# Patient Record
Sex: MALE | Race: WHITE | NOT HISPANIC OR LATINO | Employment: FULL TIME | URBAN - METROPOLITAN AREA
[De-identification: names, ages, dates, MRNs, and addresses within clinical notes are randomized per-mention and may not be internally consistent; named-entity substitution may affect disease eponyms.]

---

## 2017-01-10 ENCOUNTER — ALLSCRIPTS OFFICE VISIT (OUTPATIENT)
Dept: OTHER | Facility: OTHER | Age: 47
End: 2017-01-10

## 2017-01-27 ENCOUNTER — GENERIC CONVERSION - ENCOUNTER (OUTPATIENT)
Dept: OTHER | Facility: OTHER | Age: 47
End: 2017-01-27

## 2017-05-30 ENCOUNTER — ALLSCRIPTS OFFICE VISIT (OUTPATIENT)
Dept: OTHER | Facility: OTHER | Age: 47
End: 2017-05-30

## 2017-05-30 DIAGNOSIS — R07.81 PLEURODYNIA: ICD-10-CM

## 2017-05-31 ENCOUNTER — TRANSCRIBE ORDERS (OUTPATIENT)
Dept: URGENT CARE | Facility: CLINIC | Age: 47
End: 2017-05-31

## 2017-05-31 ENCOUNTER — HOSPITAL ENCOUNTER (OUTPATIENT)
Dept: RADIOLOGY | Facility: CLINIC | Age: 47
Discharge: HOME | End: 2017-05-31
Payer: COMMERCIAL

## 2017-05-31 DIAGNOSIS — R07.81 PLEURODYNIA: ICD-10-CM

## 2017-05-31 PROCEDURE — 71101 X-RAY EXAM UNILAT RIBS/CHEST: CPT

## 2017-06-13 ENCOUNTER — ALLSCRIPTS OFFICE VISIT (OUTPATIENT)
Dept: OTHER | Facility: OTHER | Age: 47
End: 2017-06-13

## 2017-09-15 ENCOUNTER — ALLSCRIPTS OFFICE VISIT (OUTPATIENT)
Dept: OTHER | Facility: OTHER | Age: 47
End: 2017-09-15

## 2017-11-10 ENCOUNTER — ALLSCRIPTS OFFICE VISIT (OUTPATIENT)
Dept: OTHER | Facility: OTHER | Age: 47
End: 2017-11-10

## 2017-11-11 NOTE — PROGRESS NOTES
Assessment    1  Acute maxillary sinusitis (461 0) (J01 00)    Plan  Acute maxillary sinusitis    · MethylPREDNISolone 4 MG Oral Tablet Therapy Pack (Medrol); as directed   · Zithromax Z-Daniel 250 MG Oral Tablet (Azithromycin); TAKE 2 TABLETS ON DAY 1THEN TAKE 1 TABLET A DAY FOR 4 DAYS    Discussion/Summary  The patient was counseled regarding diagnostic results,-- instructions for management,-- risk factor reductions,-- prognosis,-- patient and family education,-- impressions,-- risks and benefits of treatment options,-- importance of compliance with treatment  Possible side effects of new medications were reviewed with the patient/guardian today  The treatment plan was reviewed with the patient/guardian  The patient/guardian understands and agrees with the treatment plan      Chief Complaint  252congestion, having trouble breathing      History of Present Illness  HPI: pt complains of sinus pain and pressure, PND, cough worse with laying down, it started 2 weeks ago, pt tried zyrtec which did not help, pt has not been around sick people, pt denies vomitng or diarrhea but does have nausea      Review of Systems   Constitutional: no fever-- and-- no chills  Respiratory: no shortness of breath-- and-- no wheezing  Active Problems  1  Abdominal pain (789 00) (R10 9)   2  Acute deep vein thrombosis of lower limb, unspecified laterality   3  Acute maxillary sinusitis (461 0) (J01 00)   4  Acute sinusitis (461 9) (J01 90)   5  Allergic rhinitis due to pollen (477 0) (J30 1)   6  Benign colon polyp (211 3) (K63 5)   7  Benign essential hypertension (401 1) (I10)   8  Breast hypertrophy (611 1) (N62)   9  Chest pain (786 50) (R07 9)   10  Cough (786 2) (R05)   11  Erectile dysfunction (607 84) (N52 9)   12  External hemorrhoids (455 3) (K64 4)   13  Foot pain (729 5) (M79 673)   14  Hypercholesterolemia (272 0) (E78 00)   15  Hyperlipidemia (272 4) (E78 5)   16  Hypothyroidism (244 9) (E03 9)   17   Leg pain (729 5) (M79 606)   18  Low back pain (724 2) (M54 5)   19  Male erectile dysfunction (607 84) (N52 9)   20  Neck pain (723 1) (M54 2)   21  Need for influenza vaccination (V04 81) (Z23)   22  Rib pain (786 50) (R07 81)   23  Serous otitis media (381 4) (H65 90)   24  Sinus drainage (478 19) (J34 89)   25  Slow transit constipation (564 01) (K59 01)   26  Varicose Veins Of Lower Extremities (454 9)   27  Visual disturbances (368 9) (H53 9)    Past Medical History  1  History of Denial Of Any Significant Medical History   2  History of chest pain (V13 89) (Z87 898)   3  History of neck pain (V13 59) (Z87 39)   4  No pertinent past medical history    Family History  Mother    1  Family history of cerebral aneurysm (V17 1) (Z82 49)  Father    2  Family history of type 2 diabetes mellitus (V18 0) (Z83 3)  Maternal Grandmother    3  Family history of CAD (coronary artery disease)  Paternal Grandmother    4  Family history of colon cancer (V16 0) (Z80 0)  Family History Reviewed: The family history was reviewed and updated today  Social History   ·    · Never Drank Alcohol   · Never smoked cigarettes (V49 89) (Z78 9)   · Occupation   · Social alcohol use (Z78 9)   · Two children  The social history was reviewed and is unchanged  Surgical History    1  History of Eye Surgery   2  History of Umbilical Hernia Repair   3  History of Venous Ligation With Stripping    Current Meds   1  Aspirin 325 MG Oral Tablet; TAKE 1 TABLET EVERY MORNING; Therapy: (Recorded:17Mar2016) to Recorded   2  Ibuprofen 800 MG Oral Tablet; TAKE 1 TABLET 3 TIMES DAILY WITH FOOD AS NEEDED; Therapy: 92NRE6515 to (Evaluate:29Jun2017)  Requested for: 65FTN6104; Last Rx:88Dki0563 Ordered   3  Lisinopril 20 MG Oral Tablet; TAKE 1 TABLET DAILY AS DIRECTED  Requested for: 57Yfz1465; Last Rx:45Cll7373 Ordered   4  Omega-3 CAPS; Therapy: (Recorded:10Vuz1948) to Recorded   5  Red Yeast Rice CAPS;  Therapy: (Recorded:12Mem0150) to Recorded    The medication list was reviewed and updated today  Allergies  1  No Known Drug Allergies    Vitals   Recorded: 83UCA2892 03:08PM   Temperature 96 4 F, Tympanic   Heart Rate 93   Systolic 717   Diastolic 80   Height 6 ft 1 in   Weight 252 lb    BMI Calculated 33 25   BSA Calculated 2 37   O2 Saturation 97       Physical Exam   Constitutional  General appearance: No acute distress, well appearing and well nourished  well developed,-- appears healthy,-- well nourished-- and-- well hydrated  Ears, Nose, Mouth, and Throat  External inspection of ears and nose: Normal    Otoscopic examination: Tympanic membrance translucent with normal light reflex  Canals patent without erythema  Nasal mucosa, septum, and turbinates: Abnormal   There was a mucoid discharge from both nares  The bilateral nasal mucosa was boggy  Oropharynx: Abnormal  -- cobblestone OP  Pulmonary  Respiratory effort: No increased work of breathing or signs of respiratory distress  Respiratory rate: normal  Assessment of respiratory effort revealed normal rhythm and effort  Auscultation of lungs: Clear to auscultation, equal breath sounds bilaterally, no wheezes, no rales, no rhonci  no rales or crackles were heard bilaterally  no rhonchi  no friction rub  no wheezing  Cardiovascular  Auscultation of heart: Normal rate and rhythm, normal S1 and S2, without murmurs  The heart rate was normal  The rhythm was regular  Heart sounds: normal S1-- and-- normal S2  no murmurs were heard  Examination of extremities for edema and/or varicosities: Normal    Lymphatic  Palpation of lymph nodes in neck: No lymphadenopathy  Skin  Skin and subcutaneous tissue: Normal without rashes or lesions     Psychiatric  Mood and affect: Normal          Future Appointments    Date/Time Provider Specialty Site   01/29/2018 09:00 AM Kp Francis MD Urology [address] [place] [address]       Signatures   Electronically signed by : Stephan Torres DO; Nov 10 2017 3:27PM EST                       (Author)

## 2017-12-14 ENCOUNTER — ALLSCRIPTS OFFICE VISIT (OUTPATIENT)
Dept: OTHER | Facility: OTHER | Age: 47
End: 2017-12-14

## 2017-12-15 NOTE — PROGRESS NOTES
Assessment  1  Plantar wart of left foot (078 12) (B07 0)   2  Plantar wart of right foot (078 12) (B07 0)   3  Smell disturbance (781 1) (R43 9)    Plan  Plantar wart of right foot    · Ericka Lopes  (Podiatry) Co-Management  *  Status: Hold For - Scheduling Requested for: 38KTC8056  Care Summary provided  : Yes  Smell disturbance    · 2 - Elio Edmundo Coombs DO (Otolaryngology) Co-Management  *  Status: Hold For -Scheduling  Requested for: 97KVR6091  Care Summary provided  : Yes    Discussion/Summary  The patient was counseled regarding diagnostic results,-- instructions for management,-- risk factor reductions,-- prognosis,-- patient and family education,-- impressions,-- risks and benefits of treatment options,-- importance of compliance with treatment  Chief Complaint  Complains of planters warts on both feetAlso states 6-7 times a day for 6 months smells something sweet that no one around him smells      History of Present Illness  HPI: pt complains of plantars warts on the bottom of both feet, pt tried OTC medications but did not help, pt also complains of a sweet smell that comes and goes but it not linked to any location      Review of Systems   Constitutional: no fever-- and-- no chills  Respiratory: no shortness of breath-- and-- no wheezing  Active Problems  1  Abdominal pain (789 00) (R10 9)   2  Acute deep vein thrombosis of lower limb, unspecified laterality   3  Acute maxillary sinusitis (461 0) (J01 00)   4  Acute sinusitis (461 9) (J01 90)   5  Allergic rhinitis due to pollen (477 0) (J30 1)   6  Benign colon polyp (211 3) (K63 5)   7  Benign essential hypertension (401 1) (I10)   8  Breast hypertrophy (611 1) (N62)   9  Chest pain (786 50) (R07 9)   10  Cough (786 2) (R05)   11  Erectile dysfunction (607 84) (N52 9)   12  External hemorrhoids (455 3) (K64 4)   13  Foot pain (729 5) (M79 673)   14  Hypercholesterolemia (272 0) (E78 00)   15  Hyperlipidemia (272 4) (E78 5)   16  Hypothyroidism (244 9) (E03 9)   17  Leg pain (729 5) (M79 606)   18  Low back pain (724 2) (M54 5)   19  Male erectile dysfunction (607 84) (N52 9)   20  Neck pain (723 1) (M54 2)   21  Need for influenza vaccination (V04 81) (Z23)   22  Rib pain (786 50) (R07 81)   23  Serous otitis media (381 4) (H65 90)   24  Sinus drainage (478 19) (J34 89)   25  Slow transit constipation (564 01) (K59 01)   26  Varicose Veins Of Lower Extremities (454 9)   27  Visual disturbances (368 9) (H53 9)    Past Medical History  1  History of Denial Of Any Significant Medical History   2  History of chest pain (V13 89) (Z87 898)   3  History of neck pain (V13 59) (Z87 39)   4  No pertinent past medical history    Family History  Mother    1  Family history of cerebral aneurysm (V17 1) (Z82 49)  Father    2  Family history of type 2 diabetes mellitus (V18 0) (Z83 3)  Maternal Grandmother    3  Family history of CAD (coronary artery disease)  Paternal Grandmother    4  Family history of colon cancer (V16 0) (Z80 0)    Social History   ·    · Never Drank Alcohol   · Never smoked cigarettes (V49 89) (Z78 9)   · Occupation   · Social alcohol use (Z78 9)   · Two children  The social history was reviewed and updated today  The social history was reviewed and is unchanged  Surgical History  1  History of Eye Surgery   2  History of Umbilical Hernia Repair   3  History of Venous Ligation With Stripping    Current Meds   1  Aspirin 325 MG Oral Tablet; TAKE 1 TABLET EVERY MORNING; Therapy: (Recorded:17Mar2016) to Recorded   2  Ibuprofen 800 MG Oral Tablet; TAKE 1 TABLET 3 TIMES DAILY WITH FOOD AS NEEDED; Therapy: 34FMI6384 to (Evaluate:29Jun2017)  Requested for: 39DMQ2538; Last Rx:15Xzo5109 Ordered   3  Lisinopril 20 MG Oral Tablet; TAKE 1 TABLET DAILY AS DIRECTED  Requested for: 44TSP7445; Last Rx:63Lui5747 Ordered   4  MethylPREDNISolone 4 MG Oral Tablet Therapy Pack; as directed;  Therapy: 13IGT2982 to (Last Rx:10Nov2017)  Requested for: 15WDC4792 Ordered   5  Omega-3 CAPS; Therapy: (Recorded:54Pjp5738) to Recorded   6  Red Yeast Rice CAPS; Therapy: (Recorded:73Adk3926) to Recorded   7  Zithromax Z-Daniel 250 MG Oral Tablet; TAKE 2 TABLETS ON DAY 1 THEN TAKE 1 TABLET A DAY FOR 4 DAYS; Therapy: 99ROA2278 to (Last Rx:10Nov2017)  Requested for: 15OWD1707 Ordered    The medication list was reviewed and updated today  Allergies  1  No Known Drug Allergies    Vitals   Recorded: 17Pik7111 11:01AM   Temperature 88 9 F   Systolic 384   Diastolic 84   Height 6 ft 1 in   Weight 249 lb    BMI Calculated 32 85   BSA Calculated 2 36     Physical Exam   Constitutional  General appearance: No acute distress, well appearing and well nourished  well developed,-- appears healthy,-- well nourished-- and-- well hydrated  Pulmonary  Respiratory effort: No increased work of breathing or signs of respiratory distress  Respiratory rate: normal  Assessment of respiratory effort revealed normal rhythm and effort  Auscultation of lungs: Clear to auscultation, equal breath sounds bilaterally, no wheezes, no rales, no rhonci  no rales or crackles were heard bilaterally  no rhonchi  no friction rub  no wheezing  Cardiovascular  Auscultation of heart: Normal rate and rhythm, normal S1 and S2, without murmurs  The heart rate was normal  The rhythm was regular  Heart sounds: normal S1-- and-- normal S2  no murmurs were heard  Lymphatic  Palpation of lymph nodes in neck: No lymphadenopathy  Skin  Skin and subcutaneous tissue: Normal without rashes or lesions     Psychiatric  Mood and affect: Normal          Future Appointments    Date/Time Provider Specialty Site   01/29/2018 09:00 AM Екатерина Bridges MD Urology [address]     Signatures   Electronically signed by : Pretty Montero DO; Dec 14 2017 11:26AM EST                       (Author)

## 2017-12-20 ENCOUNTER — GENERIC CONVERSION - ENCOUNTER (OUTPATIENT)
Dept: FAMILY MEDICINE CLINIC | Facility: CLINIC | Age: 47
End: 2017-12-20

## 2017-12-28 ENCOUNTER — GENERIC CONVERSION - ENCOUNTER (OUTPATIENT)
Dept: OTHER | Facility: OTHER | Age: 47
End: 2017-12-28

## 2017-12-28 ENCOUNTER — APPOINTMENT (OUTPATIENT)
Dept: LAB | Facility: CLINIC | Age: 47
End: 2017-12-28
Payer: COMMERCIAL

## 2017-12-28 ENCOUNTER — TRANSCRIBE ORDERS (OUTPATIENT)
Dept: LAB | Facility: CLINIC | Age: 47
End: 2017-12-28

## 2017-12-28 DIAGNOSIS — E78.00 PURE HYPERCHOLESTEROLEMIA: ICD-10-CM

## 2017-12-28 DIAGNOSIS — E78.5 HYPERLIPIDEMIA: ICD-10-CM

## 2017-12-28 DIAGNOSIS — I10 ESSENTIAL (PRIMARY) HYPERTENSION: ICD-10-CM

## 2017-12-28 DIAGNOSIS — Z00.00 ENCOUNTER FOR GENERAL ADULT MEDICAL EXAMINATION WITHOUT ABNORMAL FINDINGS: ICD-10-CM

## 2017-12-28 DIAGNOSIS — R10.9 ABDOMINAL PAIN: ICD-10-CM

## 2017-12-28 LAB
ALBUMIN SERPL BCP-MCNC: 3.6 G/DL (ref 3.5–5)
ALP SERPL-CCNC: 65 U/L (ref 46–116)
ALT SERPL W P-5'-P-CCNC: 35 U/L (ref 12–78)
ANION GAP SERPL CALCULATED.3IONS-SCNC: 8 MMOL/L (ref 4–13)
AST SERPL W P-5'-P-CCNC: 20 U/L (ref 5–45)
BASOPHILS # BLD AUTO: 0.02 THOUSANDS/ΜL (ref 0–0.1)
BASOPHILS NFR BLD AUTO: 0 % (ref 0–1)
BILIRUB SERPL-MCNC: 0.44 MG/DL (ref 0.2–1)
BUN SERPL-MCNC: 17 MG/DL (ref 5–25)
CALCIUM SERPL-MCNC: 8.6 MG/DL (ref 8.3–10.1)
CHLORIDE SERPL-SCNC: 105 MMOL/L (ref 100–108)
CHOLEST SERPL-MCNC: 191 MG/DL (ref 50–200)
CO2 SERPL-SCNC: 26 MMOL/L (ref 21–32)
CREAT SERPL-MCNC: 0.95 MG/DL (ref 0.6–1.3)
EOSINOPHIL # BLD AUTO: 0.21 THOUSAND/ΜL (ref 0–0.61)
EOSINOPHIL NFR BLD AUTO: 3 % (ref 0–6)
ERYTHROCYTE [DISTWIDTH] IN BLOOD BY AUTOMATED COUNT: 12.7 % (ref 11.6–15.1)
GFR SERPL CREATININE-BSD FRML MDRD: 94 ML/MIN/1.73SQ M
GLUCOSE P FAST SERPL-MCNC: 92 MG/DL (ref 65–99)
HCT VFR BLD AUTO: 47.4 % (ref 36.5–49.3)
HDLC SERPL-MCNC: 46 MG/DL (ref 40–60)
HGB BLD-MCNC: 16.4 G/DL (ref 12–17)
LDLC SERPL CALC-MCNC: 130 MG/DL (ref 0–100)
LYMPHOCYTES # BLD AUTO: 1.85 THOUSANDS/ΜL (ref 0.6–4.47)
LYMPHOCYTES NFR BLD AUTO: 28 % (ref 14–44)
MCH RBC QN AUTO: 32.2 PG (ref 26.8–34.3)
MCHC RBC AUTO-ENTMCNC: 34.6 G/DL (ref 31.4–37.4)
MCV RBC AUTO: 93 FL (ref 82–98)
MONOCYTES # BLD AUTO: 0.74 THOUSAND/ΜL (ref 0.17–1.22)
MONOCYTES NFR BLD AUTO: 11 % (ref 4–12)
NEUTROPHILS # BLD AUTO: 3.83 THOUSANDS/ΜL (ref 1.85–7.62)
NEUTS SEG NFR BLD AUTO: 58 % (ref 43–75)
NRBC BLD AUTO-RTO: 0 /100 WBCS
PLATELET # BLD AUTO: 228 THOUSANDS/UL (ref 149–390)
PMV BLD AUTO: 10.3 FL (ref 8.9–12.7)
POTASSIUM SERPL-SCNC: 4.4 MMOL/L (ref 3.5–5.3)
PROT SERPL-MCNC: 7.2 G/DL (ref 6.4–8.2)
RBC # BLD AUTO: 5.09 MILLION/UL (ref 3.88–5.62)
SODIUM SERPL-SCNC: 139 MMOL/L (ref 136–145)
TRIGL SERPL-MCNC: 73 MG/DL
TSH SERPL DL<=0.05 MIU/L-ACNC: 2.21 UIU/ML (ref 0.36–3.74)
WBC # BLD AUTO: 6.66 THOUSAND/UL (ref 4.31–10.16)

## 2017-12-28 PROCEDURE — 36415 COLL VENOUS BLD VENIPUNCTURE: CPT

## 2017-12-28 PROCEDURE — 85025 COMPLETE CBC W/AUTO DIFF WBC: CPT

## 2017-12-28 PROCEDURE — 84443 ASSAY THYROID STIM HORMONE: CPT

## 2017-12-28 PROCEDURE — 80061 LIPID PANEL: CPT

## 2017-12-28 PROCEDURE — 80053 COMPREHEN METABOLIC PANEL: CPT

## 2018-01-10 ENCOUNTER — ALLSCRIPTS OFFICE VISIT (OUTPATIENT)
Dept: OTHER | Facility: OTHER | Age: 48
End: 2018-01-10

## 2018-01-10 DIAGNOSIS — E78.00 PURE HYPERCHOLESTEROLEMIA: ICD-10-CM

## 2018-01-10 DIAGNOSIS — Z12.5 ENCOUNTER FOR SCREENING FOR MALIGNANT NEOPLASM OF PROSTATE: ICD-10-CM

## 2018-01-10 DIAGNOSIS — I10 ESSENTIAL (PRIMARY) HYPERTENSION: ICD-10-CM

## 2018-01-13 VITALS
BODY MASS INDEX: 33.13 KG/M2 | HEART RATE: 70 BPM | SYSTOLIC BLOOD PRESSURE: 118 MMHG | TEMPERATURE: 97.4 F | DIASTOLIC BLOOD PRESSURE: 80 MMHG | WEIGHT: 250 LBS | HEIGHT: 73 IN

## 2018-01-13 VITALS
HEART RATE: 93 BPM | HEIGHT: 73 IN | BODY MASS INDEX: 33.4 KG/M2 | TEMPERATURE: 96.4 F | WEIGHT: 252 LBS | SYSTOLIC BLOOD PRESSURE: 120 MMHG | OXYGEN SATURATION: 97 % | DIASTOLIC BLOOD PRESSURE: 80 MMHG

## 2018-01-14 VITALS
DIASTOLIC BLOOD PRESSURE: 88 MMHG | HEIGHT: 73 IN | BODY MASS INDEX: 33.58 KG/M2 | WEIGHT: 253.38 LBS | SYSTOLIC BLOOD PRESSURE: 138 MMHG | TEMPERATURE: 97 F

## 2018-01-14 VITALS
SYSTOLIC BLOOD PRESSURE: 126 MMHG | HEIGHT: 73 IN | TEMPERATURE: 97.5 F | WEIGHT: 253.25 LBS | BODY MASS INDEX: 33.56 KG/M2 | DIASTOLIC BLOOD PRESSURE: 86 MMHG

## 2018-01-14 VITALS
SYSTOLIC BLOOD PRESSURE: 108 MMHG | BODY MASS INDEX: 32.01 KG/M2 | HEIGHT: 73 IN | TEMPERATURE: 96.5 F | DIASTOLIC BLOOD PRESSURE: 84 MMHG | WEIGHT: 241.5 LBS

## 2018-01-23 VITALS
TEMPERATURE: 97.3 F | HEIGHT: 73 IN | SYSTOLIC BLOOD PRESSURE: 140 MMHG | BODY MASS INDEX: 33 KG/M2 | WEIGHT: 249 LBS | DIASTOLIC BLOOD PRESSURE: 84 MMHG

## 2018-01-23 VITALS
BODY MASS INDEX: 33.4 KG/M2 | DIASTOLIC BLOOD PRESSURE: 80 MMHG | HEART RATE: 75 BPM | SYSTOLIC BLOOD PRESSURE: 130 MMHG | HEIGHT: 73 IN | OXYGEN SATURATION: 98 % | TEMPERATURE: 96.8 F | WEIGHT: 252 LBS

## 2018-01-23 NOTE — MISCELLANEOUS
Message   Recorded as Task   Date: 12/28/2017 01:31 PM, Created By: Lesa Oscar   Task Name: Miscellaneous   Assigned To: [place] [facility] [place] [facility]AM   Regarding Patient: Zan Sanders, Status: In Progress   Comment:    Velia Dorantes - 28 Dec 2017 1:31 PM     TASK CREATED  Caller: Self; [phone] (Home)  Pt stopping into office tomorrow 12/29/17 for copy psa order   Divina Johnson - 28 Dec 2017 1:31 PM     TASK IN PROGRESS   Frida Milian - 28 Dec 2017 1:38 PM     TASK EDITED  Printed out script and put in  bin for PT to   Called PT and let him know  Active Problems    1  Abdominal pain (789 00) (R10 9)   2  Acute deep vein thrombosis of lower limb, unspecified laterality   3  Acute maxillary sinusitis (461 0) (J01 00)   4  Acute sinusitis (461 9) (J01 90)   5  Allergic rhinitis due to pollen (477 0) (J30 1)   6  Benign colon polyp (211 3) (K63 5)   7  Benign essential hypertension (401 1) (I10)   8  Benign localized hyperplasia of prostate with urinary obstruction and lower urinary tract   symptoms (600 91,599 69) (N40 1,N13 9)   9  Breast hypertrophy (611 1) (N62)   10  Chest pain (786 50) (R07 9)   11  Cough (786 2) (R05)   12  Erectile dysfunction (607 84) (N52 9)   13  External hemorrhoids (455 3) (K64 4)   14  Foot pain (729 5) (M79 673)   15  Hypercholesterolemia (272 0) (E78 00)   16  Hyperlipidemia (272 4) (E78 5)   17  Hypothyroidism (244 9) (E03 9)   18  Leg pain (729 5) (M79 606)   19  Low back pain (724 2) (M54 5)   20  Male erectile dysfunction (607 84) (N52 9)   21  Neck pain (723 1) (M54 2)   22  Need for influenza vaccination (V04 81) (Z23)   23  Plantar wart of left foot (078 12) (B07 0)   24  Plantar wart of right foot (078 12) (B07 0)   25  Rib pain (786 50) (R07 81)   26  Serous otitis media (381 4) (H65 90)   27  Sinus drainage (478 19) (J34 89)   28  Slow transit constipation (564 01) (K59 01)   29  Smell disturbance (781 1) (R43 9)   30   Varicose Veins Of Lower Extremities (454 9)   31  Visual disturbances (368 9) (H53 9)    Current Meds   1  Aspirin 325 MG Oral Tablet; TAKE 1 TABLET EVERY MORNING; Therapy: (Recorded:17Mar2016) to Recorded   2  Lisinopril 20 MG Oral Tablet (Prinivil); TAKE 1 TABLET DAILY AS DIRECTED  Requested   for: 96HOG3410; Last Rx:15Zkb3153 Ordered   3  Omega-3 CAPS; Therapy: (Recorded:62Unu6171) to Recorded   4  Red Yeast Rice CAPS; Therapy: (Recorded:47Abf3384) to Recorded    Allergies    1  No Known Drug Allergies    Plan  Benign localized hyperplasia of prostate with urinary obstruction and lower urinary tract  symptoms    · (1) PSA, DIAGNOSTIC (FOLLOW-UP); Status:Active - Retrospective Authorization;   Requested for:79Hgi8037;     Signatures   Electronically signed by : Kamila Dozier, ; Dec 28 2017  1:38PM EST                       (Author)

## 2018-01-23 NOTE — PROGRESS NOTES
Assessment    1  Encounter for preventive health examination (V70 0) (Z00 00)   2  Hypercholesterolemia (272 0) (E78 00)    Plan  Benign essential hypertension, Encounter for prostate cancer screening    · (1) TSH; Status:Active; Requested QVO:73FJS9868;   Encounter for prostate cancer screening, Hypercholesterolemia    · (1) PSA (SCREEN) (Dx V76 44 Screen for Prostate Cancer); Status:Active; Requested  RLB:59LTT2860;   Hypercholesterolemia    · Benefits of Exercise/Physical Activity; Status:Complete;   Done: 72XDC2576   · Eat a low fat and low cholesterol diet ; Status:Complete;   Done: 10MAK5656   · (1) CBC/PLT/DIFF; Status:Active; Requested DEXTER:21JWI0363;    · (1) COMPREHENSIVE METABOLIC PANEL; Status:Active; Requested VVW:21FOX3181;    · (1) LIPID PANEL, FASTING; Status:Active; Requested YPR:49MPS0169; Discussion/Summary  Impression: health maintenance visit  Prostate cancer screening: PSA was ordered  The patient was counseled regarding diagnostic results, instructions for management, risk factor reductions, prognosis, patient and family education, impressions, risks and benefits of treatment options, importance of compliance with treatment  Possible side effects of new medications were reviewed with the patient/guardian today  The treatment plan was reviewed with the patient/guardian  The patient/guardian understands and agrees with the treatment plan      Chief Complaint  yearly PE review labs   Has been having pain in left leg X1 5 weeks   flu vaccination declined      History of Present Illness  HM, Adult Male: The patient is being seen for a health maintenance evaluation  The last health maintenance visit was month(s) ago  General Health: The patient's health since the last visit is described as good  He has regular dental visits  He denies vision problems  He denies hearing loss  Lifestyle:  He consumes a diverse and healthy diet  He exercises regularly   He does not use tobacco  He consumes alcohol  He denies drug use  Screening:      Review of Systems    Constitutional: no fever and no chills  Eyes: no eyesight problems  ENT: no hearing loss  Cardiovascular: no chest pain and no palpitations  Respiratory: no shortness of breath and no wheezing  Gastrointestinal: no abdominal pain, no nausea, no vomiting, no constipation, no diarrhea and no blood in stools  Genitourinary: no dysuria  Musculoskeletal: no arthralgias and no myalgias  Integumentary: no rashes and no skin lesions  Neurological: negative for seizures, but no fainting  Psychiatric: no anxiety and no depression  Hematologic/Lymphatic: no swollen glands and no swollen glands in the neck  Active Problems    1  Abdominal pain (789 00) (R10 9)   2  Acute deep vein thrombosis of lower limb, unspecified laterality   3  Acute maxillary sinusitis (461 0) (J01 00)   4  Acute sinusitis (461 9) (J01 90)   5  Allergic rhinitis due to pollen (477 0) (J30 1)   6  Benign colon polyp (211 3) (K63 5)   7  Benign essential hypertension (401 1) (I10)   8  Benign localized hyperplasia of prostate with urinary obstruction and lower urinary tract   symptoms (600 91,599 69) (N40 1,N13 9)   9  Breast hypertrophy (611 1) (N62)   10  Chest pain (786 50) (R07 9)   11  Cough (786 2) (R05)   12  Erectile dysfunction (607 84) (N52 9)   13  External hemorrhoids (455 3) (K64 4)   14  Foot pain (729 5) (M79 673)   15  Hypercholesterolemia (272 0) (E78 00)   16  Hyperlipidemia (272 4) (E78 5)   17  Hypothyroidism (244 9) (E03 9)   18  Leg pain (729 5) (M79 606)   19  Low back pain (724 2) (M54 5)   20  Male erectile dysfunction (607 84) (N52 9)   21  Neck pain (723 1) (M54 2)   22  Need for influenza vaccination (V04 81) (Z23)   23  Plantar wart of left foot (078 12) (B07 0)   24  Plantar wart of right foot (078 12) (B07 0)   25  Rib pain (786 50) (R07 81)   26  Serous otitis media (381 4) (H65 90)   27  Sinus drainage (478 19) (J34 89)   28   Slow transit constipation (564 01) (K59 01)   29  Smell disturbance (781 1) (R43 9)   30  Varicose Veins Of Lower Extremities (454 9)   31  Visual disturbances (368 9) (H53 9)    Past Medical History    · History of Denial Of Any Significant Medical History   · History of chest pain (V13 89) (R94 354)   · History of neck pain (V13 59) (Z87 39)   · No pertinent past medical history    Surgical History    · History of Eye Surgery   · History of Umbilical Hernia Repair   · History of Venous Ligation With Stripping    Family History  Mother    · Family history of cerebral aneurysm (V17 1) (Z82 49)  Father    · Family history of type 2 diabetes mellitus (V18 0) (Z83 3)  Maternal Grandmother    · Family history of CAD (coronary artery disease)  Paternal Grandmother    · Family history of colon cancer (V16 0) (Z80 0)    Social History    ·    · Never Drank Alcohol   · Never smoked cigarettes (V49 89) (Z78 9)   · Occupation   · Social alcohol use (Z78 9)   · Two children    Current Meds   1  Aspirin 325 MG Oral Tablet; TAKE 1 TABLET EVERY MORNING; Therapy: (Recorded:96Zkr6751) to Recorded   2  Lisinopril 20 MG Oral Tablet; TAKE 1 TABLET DAILY AS DIRECTED  Requested for:   13WJR7351; Last Rx:12Qfr1749 Ordered   3  Omega-3 CAPS; Therapy: (Recorded:57Zle7125) to Recorded   4  Red Yeast Rice CAPS; Therapy: (Recorded:64Fnk7960) to Recorded    Allergies    1  No Known Drug Allergies    Vitals   Recorded: 24LJS3282 03:22PM   Temperature 96 8 F, Tympanic   Heart Rate 75   Systolic 437   Diastolic 80   Height 6 ft 1 in   Weight 252 lb    BMI Calculated 33 25   BSA Calculated 2 37   O2 Saturation 98     Physical Exam    Constitutional   General appearance: No acute distress, well appearing and well nourished  Eyes   Conjunctiva and lids: No erythema, swelling or discharge  Pupils and irises: Equal, round, reactive to light      Ears, Nose, Mouth, and Throat   External inspection of ears and nose: Normal     Otoscopic examination: Tympanic membranes translucent with normal light reflex  Canals patent without erythema  Nasal mucosa, septum, and turbinates: Normal without edema or erythema  Lips, teeth, and gums: Normal, good dentition  Oropharynx: Normal with no erythema, edema, exudate or lesions  Neck   Neck: Supple, symmetric, trachea midline, no masses  Pulmonary   Respiratory effort: No increased work of breathing or signs of respiratory distress  Auscultation of lungs: Clear to auscultation  Cardiovascular   Auscultation of heart: Normal rate and rhythm, normal S1 and S2, no murmurs  Examination of extremities for edema and/or varicosities: Normal     Abdomen   Abdomen: Non-tender, no masses  Liver and spleen: No hepatomegaly or splenomegaly  Lymphatic   Palpation of lymph nodes in neck: No lymphadenopathy  Musculoskeletal   Gait and station: Normal     Inspection/palpation of digits and nails: Normal without clubbing or cyanosis  Skin   Skin and subcutaneous tissue: Normal without rashes or lesions      Psychiatric   Mood and affect: Normal        Future Appointments    Date/Time Provider Specialty Site   01/29/2018 09:00 AM Eduardo Nowak MD Urology [address] [facility]     Signatures   Electronically signed by : Vinod Wright DO; Ehsan 10 2018  3:50PM EST                       (Author)

## 2018-01-25 ENCOUNTER — APPOINTMENT (OUTPATIENT)
Dept: LAB | Facility: CLINIC | Age: 48
End: 2018-01-25
Payer: COMMERCIAL

## 2018-01-25 ENCOUNTER — TRANSCRIBE ORDERS (OUTPATIENT)
Dept: LAB | Facility: CLINIC | Age: 48
End: 2018-01-25

## 2018-01-25 DIAGNOSIS — N40.1 BENIGN PROSTATIC HYPERPLASIA WITH LOWER URINARY TRACT SYMPTOMS, SYMPTOM DETAILS UNSPECIFIED: Primary | ICD-10-CM

## 2018-01-25 DIAGNOSIS — N40.1 BENIGN PROSTATIC HYPERPLASIA WITH LOWER URINARY TRACT SYMPTOMS, SYMPTOM DETAILS UNSPECIFIED: ICD-10-CM

## 2018-01-25 LAB — PSA SERPL-MCNC: 0.4 NG/ML (ref 0–4)

## 2018-01-25 PROCEDURE — 84153 ASSAY OF PSA TOTAL: CPT

## 2018-01-26 RX ORDER — AMPICILLIN TRIHYDRATE 250 MG
CAPSULE ORAL
COMMUNITY
End: 2021-01-12

## 2018-01-26 RX ORDER — IBUPROFEN 800 MG/1
1 TABLET ORAL EVERY 8 HOURS
COMMUNITY
Start: 2017-05-30 | End: 2021-01-12

## 2018-01-26 RX ORDER — ASPIRIN 325 MG
1 TABLET ORAL
COMMUNITY
End: 2021-01-13

## 2018-01-26 RX ORDER — CHLORAL HYDRATE 500 MG
CAPSULE ORAL
COMMUNITY

## 2018-01-26 RX ORDER — LISINOPRIL 10 MG/1
10 TABLET ORAL
COMMUNITY
End: 2018-04-16 | Stop reason: DRUGHIGH

## 2018-01-29 ENCOUNTER — OFFICE VISIT (OUTPATIENT)
Dept: UROLOGY | Facility: MEDICAL CENTER | Age: 48
End: 2018-01-29
Payer: COMMERCIAL

## 2018-01-29 VITALS
DIASTOLIC BLOOD PRESSURE: 84 MMHG | WEIGHT: 248 LBS | SYSTOLIC BLOOD PRESSURE: 138 MMHG | BODY MASS INDEX: 32.87 KG/M2 | HEIGHT: 73 IN

## 2018-01-29 DIAGNOSIS — N13.8 BPH WITH URINARY OBSTRUCTION: Primary | ICD-10-CM

## 2018-01-29 DIAGNOSIS — N40.1 BPH WITH URINARY OBSTRUCTION: Primary | ICD-10-CM

## 2018-01-29 DIAGNOSIS — N52.8 OTHER MALE ERECTILE DYSFUNCTION: ICD-10-CM

## 2018-01-29 LAB
PROT UR STRIP-MCNC: NEGATIVE MG/DL
SL AMB  POCT GLUCOSE, UA: NORMAL
SL AMB LEUKOCYTE ESTERASE,UA: NORMAL
SL AMB POCT BILIRUBIN,UA: NORMAL
SL AMB POCT BLOOD,UA: NORMAL
SL AMB POCT CLARITY,UA: CLEAR
SL AMB POCT COLOR,UA: YELLOW
SL AMB POCT KETONES,UA: NORMAL
SL AMB POCT NITRITE,UA: NORMAL
SL AMB POCT PH,UA: 5.5
SL AMB POCT SPECIFIC GRAVITY,UA: 1.02
UROBILINOGEN UR QL STRIP.AUTO: 0.2 E.U./DL

## 2018-01-29 PROCEDURE — 81003 URINALYSIS AUTO W/O SCOPE: CPT | Performed by: UROLOGY

## 2018-01-29 PROCEDURE — 99214 OFFICE O/P EST MOD 30 MIN: CPT | Performed by: UROLOGY

## 2018-01-29 NOTE — LETTER
January 29, 2018     Bi Francisco, [address] [place] [id number]    Patient: Catracho Weaver   YOB: 1970   Date of Visit: 1/29/2018       Dear Dr Estrellita Palma:    Thank you for referring Da Yung to me for evaluation  Below are my notes for this consultation  If you have questions, please do not hesitate to call me  I look forward to following your patient along with you  Sincerely,        Martell Bone MD        CC: No Recipients  Martell Bone MD  1/29/2018  9:25 AM  Sign at close encounter  Assessment/Plan:    No problem-specific Assessment & Plan notes found for this encounter  Diagnoses and all orders for this visit:    BPH with urinary obstruction  -     POCT urine dip auto non-scope  -     PSA Total, Diagnostic; Future    Other male erectile dysfunction    Other orders  -     aspirin 325 mg tablet; Take 1 tablet by mouth  -     ibuprofen (MOTRIN) 800 mg tablet; Take 1 tablet by mouth every 8 (eight) hours  -     lisinopril (ZESTRIL) 10 mg tablet; Take 10 mg by mouth  -     Omega-3 1000 MG CAPS; Take by mouth  -     Red Yeast Rice 600 MG CAPS; Take by mouth        The patient is doing well  He is pleased with his voiding pattern  He will return in 1 year and we will recheck a PSA at that time  He will call should he wish a prescription for sildenafil  Subjective:      Patient ID: Catracho Weaver is a 50 y o  male  64-year-old male followed for lower urinary tract symptoms secondary to prostatic enlargement  He notes he is voiding adequately  His stream is acceptable  He feels he empties his bladder adequately  There is no urgency or incontinence  He gets up at most once a night to urinate  He has rare episodes of hesitancy and restricted flow at night  There is no gross hematuria dysuria or symptoms of infection  He also notes mild erectile dysfunction for which he occasionally takes a Viagra            The following portions of the patient's history were reviewed and updated as appropriate: allergies, current medications, past family history, past medical history, past social history, past surgical history and problem list     Review of Systems   Constitutional: Negative for chills, diaphoresis, fatigue and fever  HENT: Negative  Eyes: Negative  Respiratory: Negative  Cardiovascular: Negative  Endocrine: Negative  Musculoskeletal: Negative  Skin: Negative  Allergic/Immunologic: Negative  Neurological: Negative  Hematological: Negative  Psychiatric/Behavioral: Negative  Objective:     Physical Exam   Constitutional: He is oriented to person, place, and time  He appears well-developed and well-nourished  HENT:   Head: Normocephalic and atraumatic  Eyes: Conjunctivae are normal    Neck: Neck supple  Cardiovascular: Normal rate  Pulmonary/Chest: Effort normal    Abdominal: Soft  Bowel sounds are normal  He exhibits no distension and no mass  There is no tenderness  There is no rebound, no guarding and no CVA tenderness  Hernia confirmed negative in the right inguinal area and confirmed negative in the left inguinal area  Genitourinary: Rectum normal, testes normal and penis normal  Prostate is enlarged  Prostate is not tender  Right testis shows no mass  Left testis shows no mass  No phimosis or hypospadias  Genitourinary Comments: Normal phallus, testes high riding   Prostate 1x and benign  No nodules, NT   Musculoskeletal: He exhibits no edema  Neurological: He is alert and oriented to person, place, and time  Skin: Skin is warm and dry  Psychiatric: He has a normal mood and affect   His behavior is normal  Judgment and thought content normal

## 2018-01-29 NOTE — PROGRESS NOTES
Assessment/Plan:    No problem-specific Assessment & Plan notes found for this encounter  Diagnoses and all orders for this visit:    BPH with urinary obstruction  -     POCT urine dip auto non-scope  -     PSA Total, Diagnostic; Future    Other male erectile dysfunction    Other orders  -     aspirin 325 mg tablet; Take 1 tablet by mouth  -     ibuprofen (MOTRIN) 800 mg tablet; Take 1 tablet by mouth every 8 (eight) hours  -     lisinopril (ZESTRIL) 10 mg tablet; Take 10 mg by mouth  -     Omega-3 1000 MG CAPS; Take by mouth  -     Red Yeast Rice 600 MG CAPS; Take by mouth        The patient is doing well  He is pleased with his voiding pattern  He will return in 1 year and we will recheck a PSA at that time  He will call should he wish a prescription for sildenafil  Subjective:      Patient ID: Bre Lemus is a 50 y o  male  40-year-old male followed for lower urinary tract symptoms secondary to prostatic enlargement  He notes he is voiding adequately  His stream is acceptable  He feels he empties his bladder adequately  There is no urgency or incontinence  He gets up at most once a night to urinate  He has rare episodes of hesitancy and restricted flow at night  There is no gross hematuria dysuria or symptoms of infection  He also notes mild erectile dysfunction for which he occasionally takes a Viagra  The following portions of the patient's history were reviewed and updated as appropriate: allergies, current medications, past family history, past medical history, past social history, past surgical history and problem list     Review of Systems   Constitutional: Negative for chills, diaphoresis, fatigue and fever  HENT: Negative  Eyes: Negative  Respiratory: Negative  Cardiovascular: Negative  Endocrine: Negative  Musculoskeletal: Negative  Skin: Negative  Allergic/Immunologic: Negative  Neurological: Negative  Hematological: Negative  Psychiatric/Behavioral: Negative  Objective:     Physical Exam   Constitutional: He is oriented to person, place, and time  He appears well-developed and well-nourished  HENT:   Head: Normocephalic and atraumatic  Eyes: Conjunctivae are normal    Neck: Neck supple  Cardiovascular: Normal rate  Pulmonary/Chest: Effort normal    Abdominal: Soft  Bowel sounds are normal  He exhibits no distension and no mass  There is no tenderness  There is no rebound, no guarding and no CVA tenderness  Hernia confirmed negative in the right inguinal area and confirmed negative in the left inguinal area  Genitourinary: Rectum normal, testes normal and penis normal  Prostate is enlarged  Prostate is not tender  Right testis shows no mass  Left testis shows no mass  No phimosis or hypospadias  Genitourinary Comments: Normal phallus, testes high riding   Prostate 1x and benign  No nodules, NT   Musculoskeletal: He exhibits no edema  Neurological: He is alert and oriented to person, place, and time  Skin: Skin is warm and dry  Psychiatric: He has a normal mood and affect   His behavior is normal  Judgment and thought content normal

## 2018-01-29 NOTE — PATIENT INSTRUCTIONS
Benign Prostatic Hypertrophy   WHAT YOU NEED TO KNOW:   Benign prostatic hypertrophy (BPH) is a condition that causes your prostate gland to grow larger than normal  The prostate gland is the male sex gland that produces a fluid that is part of semen  It is about the size of a walnut and it is located under the bladder  As the prostate grows, it can squeeze the urethra  This can block urine flow and cause urinary problems  DISCHARGE INSTRUCTIONS:   Medicines:   · Alpha blockers: This medicine relaxes the muscles in your prostate and bladder  It may help you urinate more easily  · 5 alpha reductase inhibitors: These medicines block the production of a hormone that causes the prostate to get larger  It may help slow the growth of the prostate or shrink the prostate  · Take your medicine as directed  Contact your healthcare provider if you think your medicine is not helping or if you have side effects  Tell him or her if you are allergic to any medicine  Keep a list of the medicines, vitamins, and herbs you take  Include the amounts, and when and why you take them  Bring the list or the pill bottles to follow-up visits  Carry your medicine list with you in case of an emergency  Follow up with your healthcare provider as directed:  Write down your questions so you remember to ask them during your visits  Manage BPH:   · Do not let your bladder get too full before you empty it  Urinate when you feel the urge  · Limit alcohol  Do not drink large amounts of any liquid at one time  · Decrease the amount of salt you eat  Examples of salty foods are chips, cured meats, and canned soups  Do not use table salt  · Healthcare providers may tell you not to eat spicy foods such as chilli peppers  This may help you find out if spicy food makes your BPH symptoms worse  · You may have sex if you feel well  Contact your healthcare provider if:   · There is a large amount of blood in your urine  · Your signs and symptoms get worse  · You have a fever  · You have questions or concerns about your condition or care  Seek care immediately if:   · You are unable to urinate  · Your bladder feels very full and painful  © 2017 [employer] Information is for End User's use only and may not be sold, redistributed or otherwise used for commercial purposes  All illustrations and images included in CareNotes® are the copyrighted property of [employer]  or [employer]  The above information is an  only  It is not intended as medical advice for individual conditions or treatments  Talk to your doctor, nurse or pharmacist before following any medical regimen to see if it is safe and effective for you

## 2018-01-29 NOTE — PROGRESS NOTES
IPSS Questionnaire (AUA-7): Over the past month    1)  How often have you had a sensation of not emptying your bladder completely after you finish urinating?  0   2)  How often have you had to urinate again less than two hours after you finished urinating? 0   3)  How often have you found you stopped and started again several times when you urinated? 1   4) How difficult have you found it to postpone urination? 1   5) How often have you had a weak urinary stream?  1   6) How often have you had to push or strain to begin urination?  0   7) How many times did you most typically get up to urinate from the time you went to bed until the time you got up in the morning?   1   Total Score: 4

## 2018-03-05 ENCOUNTER — OFFICE VISIT (OUTPATIENT)
Dept: FAMILY MEDICINE CLINIC | Facility: CLINIC | Age: 48
End: 2018-03-05
Payer: COMMERCIAL

## 2018-03-05 VITALS
OXYGEN SATURATION: 98 % | TEMPERATURE: 98.1 F | HEIGHT: 73 IN | WEIGHT: 250 LBS | DIASTOLIC BLOOD PRESSURE: 88 MMHG | HEART RATE: 105 BPM | BODY MASS INDEX: 33.13 KG/M2 | SYSTOLIC BLOOD PRESSURE: 110 MMHG

## 2018-03-05 DIAGNOSIS — K40.90 RIGHT INGUINAL HERNIA: ICD-10-CM

## 2018-03-05 DIAGNOSIS — N50.811 TESTICULAR PAIN, RIGHT: Primary | ICD-10-CM

## 2018-03-05 PROCEDURE — 99213 OFFICE O/P EST LOW 20 MIN: CPT | Performed by: FAMILY MEDICINE

## 2018-03-05 NOTE — PROGRESS NOTES
Assessment/Plan:    No problem-specific Assessment & Plan notes found for this encounter  Diagnoses and all orders for this visit:    Testicular pain, right  -     Ambulatory referral to General Surgery; Future    Right inguinal hernia  -     Ambulatory referral to General Surgery; Future          Subjective:      Patient ID: Americo Mandujano is a 50 y o  male  Pt complains of right testicle moving up and disappearing, pt noticed this about 2 months ago, there is some mild pain, there is no unusual lumps or bumps      Testicle Pain   The patient's primary symptoms include testicular pain  Pertinent negatives include no chills, fever or rash  The following portions of the patient's history were reviewed and updated as appropriate: allergies, current medications, past family history, past medical history, past social history, past surgical history and problem list     Review of Systems   Constitutional: Negative for chills and fever  Genitourinary: Positive for testicular pain  Skin: Negative for rash  Objective:      /88 (BP Location: Left arm, Patient Position: Sitting, Cuff Size: Large)   Pulse 105   Temp 98 1 °F (36 7 °C) (Tympanic)   Ht 6' 1" (1 854 m)   Wt 113 kg (250 lb)   SpO2 98%   BMI 32 98 kg/m²          Physical Exam   Constitutional: He is oriented to person, place, and time  He appears well-developed and well-nourished  No distress  HENT:   Head: Normocephalic and atraumatic  Neck: Normal range of motion  Neck supple  Cardiovascular: Normal rate, regular rhythm and normal heart sounds  Exam reveals no gallop and no friction rub  No murmur heard  Pulmonary/Chest: Effort normal and breath sounds normal  No stridor  No respiratory distress  He has no wheezes  He has no rales  Abdominal: Soft  Bowel sounds are normal  He exhibits no distension and no mass  There is no tenderness  There is no rebound and no guarding     Lymphadenopathy:     He has no cervical adenopathy  Neurological: He is alert and oriented to person, place, and time  Skin: Skin is warm and dry  No rash noted  He is not diaphoretic  No erythema  No pallor  Psychiatric: He has a normal mood and affect  His behavior is normal  Judgment and thought content normal    Nursing note and vitals reviewed

## 2018-03-20 ENCOUNTER — OFFICE VISIT (OUTPATIENT)
Dept: SURGERY | Facility: HOSPITAL | Age: 48
End: 2018-03-20
Payer: COMMERCIAL

## 2018-03-20 VITALS
BODY MASS INDEX: 33.66 KG/M2 | SYSTOLIC BLOOD PRESSURE: 144 MMHG | TEMPERATURE: 98.1 F | HEART RATE: 98 BPM | DIASTOLIC BLOOD PRESSURE: 98 MMHG | WEIGHT: 254 LBS | HEIGHT: 73 IN

## 2018-03-20 DIAGNOSIS — N50.811 TESTICULAR PAIN, RIGHT: ICD-10-CM

## 2018-03-20 DIAGNOSIS — K40.90 RIGHT INGUINAL HERNIA: Primary | ICD-10-CM

## 2018-03-20 PROBLEM — B07.0 PLANTAR WART OF RIGHT FOOT: Status: ACTIVE | Noted: 2017-12-14

## 2018-03-20 PROBLEM — N40.1 BENIGN LOCALIZED HYPERPLASIA OF PROSTATE WITH URINARY OBSTRUCTION AND LOWER URINARY TRACT SYMPTOMS: Status: ACTIVE | Noted: 2017-12-28

## 2018-03-20 PROBLEM — N52.9 MALE ERECTILE DYSFUNCTION: Status: ACTIVE | Noted: 2018-01-29

## 2018-03-20 PROBLEM — M79.606 LEG PAIN: Status: ACTIVE | Noted: 2018-03-20

## 2018-03-20 PROBLEM — R43.9 SMELL DISTURBANCE: Status: ACTIVE | Noted: 2017-12-14

## 2018-03-20 PROBLEM — H65.90 SEROUS OTITIS MEDIA: Status: ACTIVE | Noted: 2017-09-15

## 2018-03-20 PROBLEM — K59.01 SLOW TRANSIT CONSTIPATION: Status: ACTIVE | Noted: 2017-01-10

## 2018-03-20 PROBLEM — N13.9 BENIGN LOCALIZED HYPERPLASIA OF PROSTATE WITH URINARY OBSTRUCTION AND LOWER URINARY TRACT SYMPTOMS: Status: ACTIVE | Noted: 2017-12-28

## 2018-03-20 PROBLEM — R07.81 RIB PAIN: Status: ACTIVE | Noted: 2017-05-30

## 2018-03-20 PROBLEM — R10.31 RIGHT GROIN PAIN: Status: ACTIVE | Noted: 2018-03-20

## 2018-03-20 PROBLEM — M54.2 NECK PAIN: Status: ACTIVE | Noted: 2017-09-15

## 2018-03-20 PROBLEM — B07.0 PLANTAR WART OF LEFT FOOT: Status: ACTIVE | Noted: 2017-12-14

## 2018-03-20 PROCEDURE — 99204 OFFICE O/P NEW MOD 45 MIN: CPT | Performed by: SURGERY

## 2018-03-20 NOTE — PROGRESS NOTES
Assessment/Plan:    Right testicular pain  - ultrasound of testicle and scrotum for further evaluation    Right groin pain  No obvious hernia on physical exam     - will obtain ultrasound of the right groin  Diagnoses and all orders for this visit:    Right inguinal hernia  -     Ambulatory referral to [address] [place] [address] groin/inguinal area; Future    Testicular pain, right  -     Ambulatory referral to General Surgery  -     US scrotum and testicles; Future          Subjective:      Patient ID: Sulma Howard is a 50 y o  male  57-year-old male with a history of DVT, presents to the office with complaints of right groin/testicular pain  Patient states it has been going on for quite some time off and on  Nothing makes it better or worse  Patient does occasionally lift heavy objects but does not have any changes in this right groin discomfort  Denies any significant bulge in the right groin  States he does not like to do his own soft exam of the testicles cause it is uncomfortable  He does state that he was seen by his urologist in the past for annual checkup in PSA blood work and on exam was found to have a more less retracted right testicle  As per the patient and the urologist had a pop the testicle back into the scrotum  He said that did have some discomfort that point  Denies any history of testicular torsion her issues with undescended testicles the child  He does have a cousin with a history of testicular cancer  Denies any nausea vomiting  No fever chills  No chest pain shortness of breath  The following portions of the patient's history were reviewed and updated as appropriate:   He  has a past medical history of BPH with obstruction/lower urinary tract symptoms; Deep vein thrombosis (DVT) ([address] ); Erectile dysfunction due to arterial insufficiency; Testicular hypogonadism; and Varicocele    He   Patient Active Problem List    Diagnosis Date Noted    Leg pain 03/20/2018    Right testicular pain 03/20/2018    Right groin pain 03/20/2018    Male erectile dysfunction 01/29/2018    Benign localized hyperplasia of prostate with urinary obstruction and lower urinary tract symptoms 12/28/2017    Plantar wart of left foot 12/14/2017    Plantar wart of right foot 12/14/2017    Smell disturbance 12/14/2017    Neck pain 09/15/2017    Serous otitis media 09/15/2017    Rib pain 05/30/2017    Slow transit constipation 01/10/2017    Acute maxillary sinusitis 03/17/2016    External hemorrhoids 03/17/2016    Foot pain 03/17/2016    Hypercholesterolemia 03/17/2016    Hypothyroidism 03/17/2016    Low back pain 03/17/2016    Chest pain 11/28/2015    Acute sinusitis 02/11/2015    Cough 02/11/2015    Sinus drainage 02/11/2015    Allergic rhinitis due to pollen 06/18/2012    Benign colon polyp 06/18/2012    Benign essential hypertension 06/18/2012    Breast hypertrophy 06/18/2012    Hyperlipidemia 06/18/2012    Asymptomatic varicose veins 06/18/2012    Visual disturbances 06/18/2012    History of DVT (deep vein thrombosis) 06/18/2012     He  has a past surgical history that includes Vasectomy; Eye surgery; and Inguinal hernia repair  His family history includes Bone cancer in his father; Breast cancer in his mother; Heart attack in his mother; Heart disease in his mother; Hypertension in his mother; Prostate cancer in his father; Testicular cancer in his cousin  He  reports that he has never smoked  He has never used smokeless tobacco  He reports that he drinks alcohol  He reports that he does not use drugs    Current Outpatient Prescriptions   Medication Sig Dispense Refill    aspirin 325 mg tablet Take 1 tablet by mouth      ibuprofen (MOTRIN) 800 mg tablet Take 1 tablet by mouth every 8 (eight) hours      lisinopril (ZESTRIL) 10 mg tablet Take 10 mg by mouth      Omega-3 1000 MG CAPS Take by mouth      Red Yeast Rice 600 MG CAPS Take by mouth       No current facility-administered medications for this visit  He is allergic to iodine       Review of Systems      A 10 point review systems was conducted, all negative except as noted above HPI  Objective:      /98   Pulse 98   Temp 98 1 °F (36 7 °C)   Ht 6' 1" (1 854 m)   Wt 115 kg (254 lb)   BMI 33 51 kg/m²          Physical Exam   Constitutional: He is oriented to person, place, and time  He appears well-developed and well-nourished  No distress  HENT:   Head: Normocephalic and atraumatic  Eyes: No scleral icterus  Neck: Normal range of motion  Neck supple  No tracheal deviation present  Cardiovascular: Normal rate and regular rhythm  Exam reveals friction rub  Exam reveals no gallop  No murmur heard  Pulmonary/Chest: Effort normal and breath sounds normal  No respiratory distress  He has no wheezes  He has no rales  Abdominal: Soft  He exhibits no distension and no mass  There is no tenderness  There is no rebound and no guarding  Hernia confirmed negative in the right inguinal area and confirmed negative in the left inguinal area  Genitourinary: Penis normal  Right testis shows no swelling  Right testis is descended (No palpable testicle on the right hemiscrotum  Patient very tender not area and therefore the exam was terminated  Questionable whether not the testicle was retracted into the inguinal canal )  Left testis shows no mass, no swelling and no tenderness  Left testis is descended  Cremasteric reflex is not absent on the left side  Musculoskeletal: Normal range of motion  He exhibits no edema, tenderness or deformity  Lymphadenopathy:     He has no cervical adenopathy  Right: No inguinal adenopathy present  Left: No inguinal adenopathy present  Neurological: He is alert and oriented to person, place, and time  No cranial nerve deficit  Skin: Skin is warm  No rash noted  He is not diaphoretic  No erythema  No pallor     Psychiatric: He has a normal mood and affect   His behavior is normal

## 2018-03-23 ENCOUNTER — HOSPITAL ENCOUNTER (OUTPATIENT)
Dept: ULTRASOUND IMAGING | Facility: HOSPITAL | Age: 48
Discharge: HOME | End: 2018-03-23
Attending: SURGERY
Payer: COMMERCIAL

## 2018-03-23 DIAGNOSIS — K40.90 RIGHT INGUINAL HERNIA: ICD-10-CM

## 2018-03-23 DIAGNOSIS — N50.811 TESTICULAR PAIN, RIGHT: ICD-10-CM

## 2018-03-23 PROCEDURE — 76705 ECHO EXAM OF ABDOMEN: CPT

## 2018-03-23 PROCEDURE — 76870 US EXAM SCROTUM: CPT

## 2018-04-16 ENCOUNTER — OFFICE VISIT (OUTPATIENT)
Dept: FAMILY MEDICINE CLINIC | Facility: CLINIC | Age: 48
End: 2018-04-16
Payer: COMMERCIAL

## 2018-04-16 VITALS
SYSTOLIC BLOOD PRESSURE: 120 MMHG | HEART RATE: 81 BPM | OXYGEN SATURATION: 96 % | BODY MASS INDEX: 33.13 KG/M2 | DIASTOLIC BLOOD PRESSURE: 84 MMHG | HEIGHT: 73 IN | TEMPERATURE: 96 F | WEIGHT: 250 LBS

## 2018-04-16 DIAGNOSIS — M25.532 LEFT WRIST PAIN: Primary | ICD-10-CM

## 2018-04-16 PROCEDURE — 99213 OFFICE O/P EST LOW 20 MIN: CPT | Performed by: FAMILY MEDICINE

## 2018-04-16 RX ORDER — LISINOPRIL 20 MG/1
1 TABLET ORAL DAILY
COMMUNITY
Start: 2018-02-21 | End: 2018-07-13

## 2018-04-16 RX ORDER — INDOMETHACIN 75 MG/1
75 CAPSULE, EXTENDED RELEASE ORAL 2 TIMES DAILY WITH MEALS
Qty: 30 CAPSULE | Refills: 1 | Status: SHIPPED | OUTPATIENT
Start: 2018-04-16 | End: 2018-07-13

## 2018-04-16 NOTE — PROGRESS NOTES
Assessment/Plan:    No problem-specific Assessment & Plan notes found for this encounter  Diagnoses and all orders for this visit:    Left wrist pain  -     XR wrist 3+ vw left; Future  -     indomethacin (INDOCIN SR) 75 mg CR capsule; Take 1 capsule (75 mg total) by mouth 2 (two) times a day with meals  -     Uric acid; Future    Other orders  -     lisinopril (ZESTRIL) 20 mg tablet; Take 1 tablet by mouth daily          Subjective:      Patient ID: Guera Joya is a 50 y o  male  Pt complains of left wrist pain, tender to the touch, worse with movement and on the ulnar side, it started about 1 weeks ago, pt has not tried anything for it, pt can recall no injury, there is mild swelling        The following portions of the patient's history were reviewed and updated as appropriate: allergies, current medications, past family history, past medical history, past social history, past surgical history and problem list     Review of Systems   Constitutional: Negative for chills and fever  Skin: Negative for rash  Objective:      /84 (BP Location: Right arm, Patient Position: Sitting, Cuff Size: Large)   Pulse 81   Temp (!) 96 °F (35 6 °C) (Tympanic)   Ht 6' 1" (1 854 m)   Wt 113 kg (250 lb)   SpO2 96%   BMI 32 98 kg/m²          Physical Exam   Constitutional: He is oriented to person, place, and time  He appears well-developed and well-nourished  No distress  HENT:   Head: Normocephalic and atraumatic  Eyes: Conjunctivae and EOM are normal  Pupils are equal, round, and reactive to light  No scleral icterus  Neck: Normal range of motion  Neck supple  Cardiovascular: Normal rate, regular rhythm and normal heart sounds  No murmur heard  Pulmonary/Chest: Effort normal and breath sounds normal  No respiratory distress  He has no wheezes  He has no rales  Musculoskeletal: He exhibits no edema     Point tenderness on the volar surface ulnar side of the right wrist, tender on the skin also, no erythema   Lymphadenopathy:     He has no cervical adenopathy  Neurological: He is alert and oriented to person, place, and time  Skin: Skin is warm and dry  No rash noted  He is not diaphoretic  No erythema  No pallor  Psychiatric: He has a normal mood and affect  His behavior is normal  Judgment and thought content normal    Nursing note and vitals reviewed      Ortho Exam

## 2018-04-18 ENCOUNTER — APPOINTMENT (OUTPATIENT)
Dept: RADIOLOGY | Facility: CLINIC | Age: 48
End: 2018-04-18
Payer: COMMERCIAL

## 2018-04-18 ENCOUNTER — APPOINTMENT (OUTPATIENT)
Dept: LAB | Facility: CLINIC | Age: 48
End: 2018-04-18
Payer: COMMERCIAL

## 2018-04-18 ENCOUNTER — TRANSCRIBE ORDERS (OUTPATIENT)
Dept: LAB | Facility: CLINIC | Age: 48
End: 2018-04-18

## 2018-04-18 DIAGNOSIS — M25.532 LEFT WRIST PAIN: ICD-10-CM

## 2018-04-18 LAB — URATE SERPL-MCNC: 5.4 MG/DL (ref 4.2–8)

## 2018-04-18 PROCEDURE — 36415 COLL VENOUS BLD VENIPUNCTURE: CPT

## 2018-04-18 PROCEDURE — 84550 ASSAY OF BLOOD/URIC ACID: CPT

## 2018-04-18 PROCEDURE — 73110 X-RAY EXAM OF WRIST: CPT

## 2018-04-23 ENCOUNTER — OFFICE VISIT (OUTPATIENT)
Dept: FAMILY MEDICINE CLINIC | Facility: CLINIC | Age: 48
End: 2018-04-23
Payer: COMMERCIAL

## 2018-04-23 VITALS
WEIGHT: 251 LBS | HEIGHT: 73 IN | DIASTOLIC BLOOD PRESSURE: 80 MMHG | TEMPERATURE: 97.5 F | BODY MASS INDEX: 33.27 KG/M2 | SYSTOLIC BLOOD PRESSURE: 120 MMHG

## 2018-04-23 DIAGNOSIS — M25.532 LEFT WRIST PAIN: Primary | ICD-10-CM

## 2018-04-23 PROCEDURE — 99213 OFFICE O/P EST LOW 20 MIN: CPT | Performed by: FAMILY MEDICINE

## 2018-04-23 NOTE — PROGRESS NOTES
Assessment/Plan:    No problem-specific Assessment & Plan notes found for this encounter  Diagnoses and all orders for this visit:    Left wrist pain  Comments:  marked improvment          Subjective:      Patient ID: Lauren Hewitt is a 50 y o  male  Follow up for left wrist pain, pt tried indomethacin which got rid of the pain but caused dizziness as a side effects, pt was tested for got but uric acid was negative, pt is doing well currently, pt had a negative x-ray of the wrist        The following portions of the patient's history were reviewed and updated as appropriate: allergies, current medications, past family history, past medical history, past social history, past surgical history and problem list     Review of Systems   Constitutional: Negative for chills and fever  Skin: Negative for rash  Objective:      /80   Temp 97 5 °F (36 4 °C)   Ht 6' 1" (1 854 m)   Wt 114 kg (251 lb)   BMI 33 12 kg/m²          Physical Exam   Constitutional: He is oriented to person, place, and time  He appears well-developed and well-nourished  No distress  Cardiovascular: Normal rate, regular rhythm and normal heart sounds  No murmur heard  Pulmonary/Chest: Effort normal and breath sounds normal  No respiratory distress  He has no wheezes  He has no rales  Musculoskeletal:   Normal examination of the left wrist   Neurological: He is alert and oriented to person, place, and time  He exhibits normal muscle tone  Skin: Skin is warm and dry  No rash noted  He is not diaphoretic  No erythema  No pallor  Psychiatric: He has a normal mood and affect   Judgment and thought content normal      Ortho Exam

## 2018-07-13 ENCOUNTER — OFFICE VISIT (OUTPATIENT)
Dept: FAMILY MEDICINE CLINIC | Facility: CLINIC | Age: 48
End: 2018-07-13
Payer: COMMERCIAL

## 2018-07-13 VITALS
HEIGHT: 73 IN | HEART RATE: 70 BPM | DIASTOLIC BLOOD PRESSURE: 78 MMHG | BODY MASS INDEX: 30.22 KG/M2 | SYSTOLIC BLOOD PRESSURE: 102 MMHG | WEIGHT: 228 LBS | OXYGEN SATURATION: 97 % | TEMPERATURE: 97.7 F

## 2018-07-13 DIAGNOSIS — I10 ESSENTIAL (PRIMARY) HYPERTENSION: Primary | ICD-10-CM

## 2018-07-13 DIAGNOSIS — R21 RASH: ICD-10-CM

## 2018-07-13 PROCEDURE — 99213 OFFICE O/P EST LOW 20 MIN: CPT | Performed by: FAMILY MEDICINE

## 2018-07-13 PROCEDURE — 3078F DIAST BP <80 MM HG: CPT | Performed by: FAMILY MEDICINE

## 2018-07-13 PROCEDURE — 3074F SYST BP LT 130 MM HG: CPT | Performed by: FAMILY MEDICINE

## 2018-07-13 RX ORDER — TRIAMCINOLONE ACETONIDE 5 MG/G
CREAM TOPICAL 2 TIMES DAILY
Qty: 30 G | Refills: 1 | Status: SHIPPED | OUTPATIENT
Start: 2018-07-13 | End: 2019-02-15

## 2018-07-13 NOTE — PROGRESS NOTES
Assessment/Plan:    No problem-specific Assessment & Plan notes found for this encounter  Diagnoses and all orders for this visit:    Essential (primary) hypertension    Rash  -     triamcinolone (KENALOG) 0 5 % cream; Apply topically 2 (two) times a day          Subjective:      Patient ID: Carlton Colin is a 50 y o  male  It is itching, pt tried OTC cream but it did not help, pt has lost weight through diet and exercise and is finding his Bps are low on the BP medication      Rash   This is a new problem  The current episode started 1 to 4 weeks ago  The affected locations include the left lower leg and right lower leg  The rash is characterized by redness  Pertinent negatives include no fever or shortness of breath  The following portions of the patient's history were reviewed and updated as appropriate: allergies, current medications, past family history, past medical history, past social history, past surgical history and problem list     Review of Systems   Constitutional: Negative for chills and fever  Respiratory: Negative for shortness of breath and wheezing  Skin: Positive for rash  Objective:      /78 (BP Location: Left arm, Patient Position: Sitting, Cuff Size: Adult)   Pulse 70   Temp 97 7 °F (36 5 °C) (Tympanic)   Ht 6' 1" (1 854 m)   Wt 103 kg (228 lb)   SpO2 97%   BMI 30 08 kg/m²          Physical Exam   Constitutional: He is oriented to person, place, and time  He appears well-developed and well-nourished  No distress  HENT:   Head: Normocephalic and atraumatic  Eyes: Conjunctivae and EOM are normal  Pupils are equal, round, and reactive to light  No scleral icterus  Neck: Normal range of motion  Neck supple  Cardiovascular: Normal rate, regular rhythm and normal heart sounds  No murmur heard  Pulmonary/Chest: Effort normal and breath sounds normal  No respiratory distress  He has no wheezes  He has no rales     Musculoskeletal: He exhibits no edema    Lymphadenopathy:     He has no cervical adenopathy  Neurological: He is alert and oriented to person, place, and time  Skin: Skin is warm and dry  Rash noted  He is not diaphoretic  Psychiatric: He has a normal mood and affect  His behavior is normal  Judgment and thought content normal    Nursing note and vitals reviewed

## 2018-08-06 ENCOUNTER — OFFICE VISIT (OUTPATIENT)
Dept: FAMILY MEDICINE CLINIC | Facility: CLINIC | Age: 48
End: 2018-08-06
Payer: COMMERCIAL

## 2018-08-06 VITALS
DIASTOLIC BLOOD PRESSURE: 82 MMHG | SYSTOLIC BLOOD PRESSURE: 110 MMHG | HEIGHT: 73 IN | TEMPERATURE: 97.7 F | BODY MASS INDEX: 31.28 KG/M2 | WEIGHT: 236 LBS

## 2018-08-06 DIAGNOSIS — R60.0 BILATERAL LOWER EXTREMITY EDEMA: Primary | ICD-10-CM

## 2018-08-06 PROCEDURE — 3008F BODY MASS INDEX DOCD: CPT | Performed by: FAMILY MEDICINE

## 2018-08-06 PROCEDURE — 99213 OFFICE O/P EST LOW 20 MIN: CPT | Performed by: FAMILY MEDICINE

## 2018-08-06 RX ORDER — FUROSEMIDE 20 MG/1
20 TABLET ORAL DAILY
Qty: 7 TABLET | Refills: 1 | Status: SHIPPED | OUTPATIENT
Start: 2018-08-06 | End: 2019-02-15

## 2018-08-06 NOTE — LETTER
August 6, 2018     Patient: Earle Lutz   YOB: 1970   Date of Visit: 8/6/2018       To Whom it May Concern:    Tanisha Myers is under my professional care  He was seen in my office on 8/6/2018  He 8/6/18  If you have any questions or concerns, please don't hesitate to call           Sincerely,          Ijeoma Arnett, DO

## 2018-08-06 NOTE — PROGRESS NOTES
Assessment/Plan:    No problem-specific Assessment & Plan notes found for this encounter  Diagnoses and all orders for this visit:    Bilateral lower extremity edema  -     furosemide (LASIX) 20 mg tablet; Take 1 tablet (20 mg total) by mouth daily          Subjective:      Patient ID: Darien Jimenez is a 50 y o  male  Pt complains of bilateral LE swelling, pt has always had some swelling but it has been getting worse over the last week, pt was recently on a cruise eating salty food        The following portions of the patient's history were reviewed and updated as appropriate: allergies, current medications, past family history, past medical history, past social history, past surgical history and problem list     Review of Systems   Constitutional: Negative for chills and fever  Respiratory: Negative for shortness of breath and wheezing  Cardiovascular: Negative for chest pain and palpitations  Objective:      /82   Temp 97 7 °F (36 5 °C)   Ht 6' 1" (1 854 m)   Wt 107 kg (236 lb)   BMI 31 14 kg/m²          Physical Exam   Constitutional: He is oriented to person, place, and time  He appears well-developed and well-nourished  No distress  HENT:   Head: Normocephalic and atraumatic  Eyes: Conjunctivae and EOM are normal  Pupils are equal, round, and reactive to light  No scleral icterus  Neck: Normal range of motion  Neck supple  Cardiovascular: Normal rate, regular rhythm and normal heart sounds  No murmur heard  Bilateral +2 edema   Pulmonary/Chest: Effort normal and breath sounds normal  No respiratory distress  He has no wheezes  He has no rales  Abdominal: Soft  Bowel sounds are normal  He exhibits no distension and no mass  There is no tenderness  There is no rebound and no guarding  Musculoskeletal: He exhibits no edema  Lymphadenopathy:     He has no cervical adenopathy  Neurological: He is alert and oriented to person, place, and time   He exhibits normal muscle tone  Skin: Skin is warm and dry  No rash noted  He is not diaphoretic  No erythema  No pallor  Psychiatric: He has a normal mood and affect  His behavior is normal  Judgment and thought content normal    Nursing note and vitals reviewed

## 2018-08-14 ENCOUNTER — OFFICE VISIT (OUTPATIENT)
Dept: FAMILY MEDICINE CLINIC | Facility: CLINIC | Age: 48
End: 2018-08-14
Payer: COMMERCIAL

## 2018-08-14 VITALS
WEIGHT: 230.2 LBS | DIASTOLIC BLOOD PRESSURE: 82 MMHG | TEMPERATURE: 97.8 F | BODY MASS INDEX: 30.51 KG/M2 | SYSTOLIC BLOOD PRESSURE: 130 MMHG | HEIGHT: 73 IN

## 2018-08-14 DIAGNOSIS — R03.0 ELEVATED BLOOD PRESSURE READING: Primary | ICD-10-CM

## 2018-08-14 DIAGNOSIS — R60.0 BILATERAL LOWER EXTREMITY EDEMA: ICD-10-CM

## 2018-08-14 PROCEDURE — 1036F TOBACCO NON-USER: CPT | Performed by: FAMILY MEDICINE

## 2018-08-14 PROCEDURE — 99213 OFFICE O/P EST LOW 20 MIN: CPT | Performed by: FAMILY MEDICINE

## 2018-08-14 NOTE — PROGRESS NOTES
Assessment/Plan:    No problem-specific Assessment & Plan notes found for this encounter  Diagnoses and all orders for this visit:    Elevated blood pressure reading  Comments:  normal blood pressures at home    Bilateral lower extremity edema  Comments:  resolved          Subjective:      Patient ID: Layo Hassan is a 50 y o  male  Follow up for LE edema which is improved with lasix, follow up for elevated BP pt has been checking Bps at home and seeing an average of 121 over 82        The following portions of the patient's history were reviewed and updated as appropriate: allergies, current medications, past family history, past medical history, past social history, past surgical history and problem list     Review of Systems   Eyes: Negative for visual disturbance  Cardiovascular: Negative for chest pain and palpitations  Neurological: Negative for headaches  Objective:      /82 (BP Location: Right arm, Patient Position: Sitting, Cuff Size: Adult)   Temp 97 8 °F (36 6 °C) (Tympanic)   Ht 6' 1" (1 854 m)   Wt 104 kg (230 lb 3 2 oz)   BMI 30 37 kg/m²          Physical Exam   Constitutional: He is oriented to person, place, and time  He appears well-developed and well-nourished  No distress  HENT:   Head: Normocephalic and atraumatic  Eyes: Conjunctivae and EOM are normal  Pupils are equal, round, and reactive to light  No scleral icterus  Neck: Normal range of motion  Neck supple  Cardiovascular: Normal rate, regular rhythm and normal heart sounds  No murmur heard  Pulmonary/Chest: Effort normal and breath sounds normal  No respiratory distress  He has no wheezes  He has no rales  Abdominal: Soft  Bowel sounds are normal  He exhibits no distension and no mass  There is no tenderness  There is no rebound and no guarding  Musculoskeletal: He exhibits no edema  Lymphadenopathy:     He has no cervical adenopathy     Neurological: He is alert and oriented to person, place, and time  He exhibits normal muscle tone  Skin: Skin is warm and dry  No rash noted  He is not diaphoretic  No erythema  No pallor  Psychiatric: He has a normal mood and affect  His behavior is normal  Judgment and thought content normal    Nursing note and vitals reviewed

## 2018-12-19 ENCOUNTER — OFFICE VISIT (OUTPATIENT)
Dept: FAMILY MEDICINE CLINIC | Facility: CLINIC | Age: 48
End: 2018-12-19
Payer: COMMERCIAL

## 2018-12-19 VITALS
HEIGHT: 73 IN | DIASTOLIC BLOOD PRESSURE: 88 MMHG | BODY MASS INDEX: 33.53 KG/M2 | WEIGHT: 253 LBS | TEMPERATURE: 96.8 F | SYSTOLIC BLOOD PRESSURE: 148 MMHG

## 2018-12-19 DIAGNOSIS — H60.502 ACUTE OTITIS EXTERNA OF LEFT EAR, UNSPECIFIED TYPE: Primary | ICD-10-CM

## 2018-12-19 DIAGNOSIS — Z12.11 SCREENING FOR COLON CANCER: ICD-10-CM

## 2018-12-19 PROCEDURE — 1036F TOBACCO NON-USER: CPT | Performed by: FAMILY MEDICINE

## 2018-12-19 PROCEDURE — 99213 OFFICE O/P EST LOW 20 MIN: CPT | Performed by: FAMILY MEDICINE

## 2018-12-19 PROCEDURE — 3008F BODY MASS INDEX DOCD: CPT | Performed by: FAMILY MEDICINE

## 2018-12-19 RX ORDER — CIPROFLOXACIN AND DEXAMETHASONE 3; 1 MG/ML; MG/ML
4 SUSPENSION/ DROPS AURICULAR (OTIC) 2 TIMES DAILY
Qty: 7.5 ML | Refills: 1 | Status: SHIPPED | OUTPATIENT
Start: 2018-12-19 | End: 2019-02-15

## 2018-12-19 NOTE — PROGRESS NOTES
Assessment/Plan:    No problem-specific Assessment & Plan notes found for this encounter  Diagnoses and all orders for this visit:    Acute otitis externa of left ear, unspecified type  -     ciprofloxacin-dexamethasone (CIPRODEX) otic suspension; Administer 4 drops into the left ear 2 (two) times a day    Screening for colon cancer  -     Ambulatory referral to Gastroenterology; Future          Subjective:      Patient ID: Prashant Alcaraz is a 46 y o  male  Earache    There is pain in the left ear  This is a new problem  The current episode started in the past 7 days  The problem has been unchanged  Pertinent negatives include no coughing, hearing loss or rhinorrhea  He has tried nothing for the symptoms  The treatment provided no relief  The following portions of the patient's history were reviewed and updated as appropriate: allergies, current medications, past family history, past medical history, past social history, past surgical history and problem list     Review of Systems   Constitutional: Negative for chills and fever  HENT: Positive for ear pain  Negative for congestion, hearing loss, rhinorrhea, sinus pain and sinus pressure  Respiratory: Negative for cough  Objective:      /88   Temp (!) 96 8 °F (36 °C)   Ht 6' 1" (1 854 m)   Wt 115 kg (253 lb)   BMI 33 38 kg/m²          Physical Exam   Constitutional: He is oriented to person, place, and time  He appears well-developed and well-nourished  No distress  HENT:   Head: Normocephalic and atraumatic  Right Ear: Tympanic membrane, external ear and ear canal normal    Left Ear: Tympanic membrane normal  There is swelling and tenderness  Nose: Mucosal edema and rhinorrhea present  Mouth/Throat: Mucous membranes are normal  No oropharyngeal exudate  [place] OP   Eyes: Right eye exhibits no discharge  Left eye exhibits no discharge  Neck: Normal range of motion  Neck supple     Cardiovascular: Normal rate, regular rhythm and normal heart sounds  No murmur heard  Pulmonary/Chest: Effort normal and breath sounds normal  No stridor  No respiratory distress  He has no wheezes  He has no rales  He exhibits no tenderness  Lymphadenopathy:     He has no cervical adenopathy  Neurological: He is alert and oriented to person, place, and time  He exhibits normal muscle tone  Skin: Skin is warm and dry  No rash noted  He is not diaphoretic  No erythema  No pallor  Psychiatric: He has a normal mood and affect  His behavior is normal  Judgment and thought content normal    Nursing note and vitals reviewed

## 2018-12-24 ENCOUNTER — OFFICE VISIT (OUTPATIENT)
Dept: URGENT CARE | Facility: MEDICAL CENTER | Age: 48
End: 2018-12-24
Payer: COMMERCIAL

## 2018-12-24 VITALS
WEIGHT: 253 LBS | HEART RATE: 81 BPM | TEMPERATURE: 97.7 F | DIASTOLIC BLOOD PRESSURE: 68 MMHG | HEIGHT: 73 IN | SYSTOLIC BLOOD PRESSURE: 122 MMHG | BODY MASS INDEX: 33.53 KG/M2 | RESPIRATION RATE: 18 BRPM | OXYGEN SATURATION: 98 %

## 2018-12-24 DIAGNOSIS — J01.10 ACUTE NON-RECURRENT FRONTAL SINUSITIS: Primary | ICD-10-CM

## 2018-12-24 DIAGNOSIS — H66.92 LEFT OTITIS MEDIA, UNSPECIFIED OTITIS MEDIA TYPE: ICD-10-CM

## 2018-12-24 PROCEDURE — G0383 LEV 4 HOSP TYPE B ED VISIT: HCPCS | Performed by: NURSE PRACTITIONER

## 2018-12-24 RX ORDER — DOXYCYCLINE 100 MG/1
100 TABLET ORAL 2 TIMES DAILY
Qty: 20 TABLET | Refills: 0 | Status: SHIPPED | OUTPATIENT
Start: 2018-12-24 | End: 2019-01-03

## 2018-12-24 NOTE — PATIENT INSTRUCTIONS
May alternate Tylenol and Ibuprofen as needed  Encourage fluids and rest    Saline nasal spray as needed  Humidify bedroom  Salt water gargles  Chloraseptic spray and lozenges as needed  Consider adding anti-histamines daily, ie  Claritin or Zyrtec  Complete course of antibiotics as directed  F/U with PCP if symptoms persist/worsen or go to nearest emergency department if any signs of distress  Rhinosinusitis   WHAT YOU NEED TO KNOW:   Rhinosinusitis (RS) is inflammation of your nose and sinuses  It commonly begins as a virus, often as a common cold  Viruses usually last 7 to 10 days and do not need treatment  When the virus does not get better on its own, you may have bacterial RS  This means that bacteria have begun to grow inside your sinuses  Acute RS lasts less than 4 weeks  Chronic RS lasts 12 weeks or more  Recurrent RS is when you have 4 or more episodes of RS in one year  DISCHARGE INSTRUCTIONS:   Return to the emergency department if:   · Your eye and eyelid are red, swollen, and painful  · You cannot open your eye  · You have double vision or you cannot see  · Your eyeball bulges out or you cannot move your eye  · You are more sleepy than normal or you notice changes in your ability to think, move, or talk  · You have a stiff neck, a fever, or a bad headache  · You have swelling of your forehead or scalp  Contact your healthcare provider if:   · Your symptoms are worse or do not improve after 3 to 5 days of treatment  · You have questions or concerns about your condition or care  Medicines: You may need any of the following:  · Acetaminophen  decreases pain and fever  It is available without a doctor's order  Ask how much to take and how often to take it  Follow directions  Acetaminophen can cause liver damage if not taken correctly  · NSAIDs , such as ibuprofen, help decrease swelling, pain, and fever   This medicine is available with or without a doctor's order  NSAIDs can cause stomach bleeding or kidney problems in certain people  If you take blood thinner medicine, always ask your healthcare provider if NSAIDs are safe for you  Always read the medicine label and follow directions  · Nasal steroid sprays  decrease inflammation in your nose and sinuses  · Decongestants  reduce swelling and drain mucus in the nose and sinuses  They may help you breathe easier  · Antihistamines  dry mucus in the nose and relieve sneezing  · Antibiotics  treat a bacterial infection and may be needed if your symptoms do not improve or they get worse  · Take your medicine as directed  Contact your healthcare provider if you think your medicine is not helping or if you have side effects  Tell him or her if you are allergic to any medicine  Keep a list of the medicines, vitamins, and herbs you take  Include the amounts, and when and why you take them  Bring the list or the pill bottles to follow-up visits  Carry your medicine list with you in case of an emergency  Self-care:   · Rinse your sinuses  Use a sinus rinse device to rinse your nasal passages with a saline (salt water) solution  This will help thin the mucus in your nose and rinse away pollen and dirt  It will also help reduce swelling so you can breathe normally  Ask your healthcare provider how often to do this  · Breathe in steam   Heat a bowl of water until you see steam  Lean over the bowl and make a tent over your head with a large towel  Breathe deeply for about 20 minutes  Be careful not to get too close to the steam or burn yourself  Do this 3 times a day  You can also breathe deeply when you take a hot shower  · Sleep with your head elevated  Place an extra pillow under your head before you go to sleep to help your sinuses drain  · Drink liquids as directed  Ask your healthcare provider how much liquid to drink each day and which liquids are best for you   Liquids will thin the mucus in your nose and help it drain  Avoid drinks that contain alcohol or caffeine  · Do not smoke, and avoid secondhand smoke  Nicotine and other chemicals in cigarettes and cigars can make your symptoms worse  Ask your healthcare provider for information if you currently smoke and need help to quit  E-cigarettes or smokeless tobacco still contain nicotine  Talk to your healthcare provider before you use these products  Follow up with your healthcare provider as directed: Follow up if your symptoms are worse or not better after 3 to 5 days of treatment  Write down your questions so you remember to ask them during your visits  © 2017 [address] [facility] Information is for End User's use only and may not be sold, redistributed or otherwise used for commercial purposes  All illustrations and images included in CareNotes® are the copyrighted property of [employer]  or [employer]  The above information is an  only  It is not intended as medical advice for individual conditions or treatments  Talk to your doctor, nurse or pharmacist before following any medical regimen to see if it is safe and effective for you  Otitis Media   WHAT YOU NEED TO KNOW:   Otitis media is an ear infection  DISCHARGE INSTRUCTIONS:   Medicines:  · Ibuprofen or acetaminophen  helps decrease your pain and fever  They are available without a doctor's order  Ask your healthcare provider which medicine is right for you  Ask how much to take and how often to take it  These medicines can cause stomach bleeding if not taken correctly  Ibuprofen can cause kidney damage  Do not take ibuprofen if you have kidney disease, an ulcer, or allergies to aspirin  Acetaminophen can cause liver damage  Do not drink alcohol if you take acetaminophen  · Ear drops  help treat your ear pain  · Antibiotics  help treat a bacterial infection that caused your ear infection  · Take your medicine as directed  Contact your healthcare provider if you think your medicine is not helping or if you have side effects  Tell him or her if you are allergic to any medicine  Keep a list of the medicines, vitamins, and herbs you take  Include the amounts, and when and why you take them  Bring the list or the pill bottles to follow-up visits  Carry your medicine list with you in case of an emergency  Heat or ice:   · Heat  may be used to decrease your pain  Place a warm, moist washcloth on your ear  Apply for 15 to 20 minutes, 3 to 4 times a day    · Ice  helps decrease swelling and pain  Use an ice pack or put crushed ice in a plastic bag  Cover the ice pack with a towel and place it on your ear for 15 to 20 minutes, 3 to 4 times a day for 2 days  Prevent otitis media:   · Wash your hands often  Use soap and water  Wash your hands after you use the bathroom, change a child's diapers, or sneeze  Wash your hands before you prepare or eat food  · Stay away from people who are ill  Some germs are easily and quickly spread through contact  Return to work or school: You may return to work or school when your fever is gone  Follow up with your healthcare provider as directed:  Write down your questions so you remember to ask them during your visits  Contact your healthcare provider if:   · Your ear pain gets worse or does not go away, even after treatment  · The outside of your ear is red or swollen  · You have vomiting or diarrhea  · You have fluid coming from your ear  · You have questions or concerns about your condition or care  Return to the emergency department if:   · You have a seizure  · You have a fever and a stiff neck  © 2017 [address] [facility] Information is for End User's use only and may not be sold, redistributed or otherwise used for commercial purposes  All illustrations and images included in CareNotes® are the copyrighted property of [employer]  or [employer]    The above information is an  only  It is not intended as medical advice for individual conditions or treatments  Talk to your doctor, nurse or pharmacist before following any medical regimen to see if it is safe and effective for you

## 2019-01-20 ENCOUNTER — OFFICE VISIT (OUTPATIENT)
Dept: URGENT CARE | Facility: CLINIC | Age: 49
End: 2019-01-20
Payer: COMMERCIAL

## 2019-01-20 VITALS
DIASTOLIC BLOOD PRESSURE: 88 MMHG | HEART RATE: 103 BPM | OXYGEN SATURATION: 96 % | SYSTOLIC BLOOD PRESSURE: 144 MMHG | TEMPERATURE: 100.1 F | RESPIRATION RATE: 18 BRPM

## 2019-01-20 DIAGNOSIS — J02.9 ACUTE PHARYNGITIS, UNSPECIFIED ETIOLOGY: Primary | ICD-10-CM

## 2019-01-20 LAB — S PYO AG THROAT QL: NEGATIVE

## 2019-01-20 PROCEDURE — 87070 CULTURE OTHR SPECIMN AEROBIC: CPT | Performed by: NURSE PRACTITIONER

## 2019-01-20 PROCEDURE — G0382 LEV 3 HOSP TYPE B ED VISIT: HCPCS | Performed by: NURSE PRACTITIONER

## 2019-01-20 PROCEDURE — 87430 STREP A AG IA: CPT | Performed by: NURSE PRACTITIONER

## 2019-01-20 NOTE — PROGRESS NOTES
[facility]        NAME: Sulma Howard is a 46 y o  male  : 1970    MRN: [id number]  DATE: 2019  TIME: 10:59 AM    Assessment and Plan   Acute pharyngitis, unspecified etiology [J02 9]  1  Acute pharyngitis, unspecified etiology  POCT rapid strepA    Throat culture         Patient Instructions   May use any of the following for symptomatic control of sore throat symptoms:  Saltwater gargles, tea with honey, lozenges, Chloraseptic spray  Follow up with PCP in 3-5 days  Proceed to  ER if symptoms worsen  Chief Complaint     Chief Complaint   Patient presents with    Fever     Pt c/o a fever, sore throat, and nausea since this morning  History of Present Illness       Sore Throat    This is a new problem  Episode onset: Last night  The maximum temperature recorded prior to his arrival was 100 4 - 100 9 F  Associated symptoms include congestion, diarrhea (One episode this morning ), swollen glands and trouble swallowing  Pertinent negatives include no coughing, shortness of breath or stridor  Review of Systems   Review of Systems   Constitutional: Positive for fever  HENT: Positive for congestion, sore throat and trouble swallowing  Eyes: Negative  Respiratory: Negative for cough, choking, chest tightness, shortness of breath, wheezing and stridor  Cardiovascular: Negative  Gastrointestinal: Positive for diarrhea (One episode this morning )  Genitourinary: Negative  Musculoskeletal: Negative            Current Medications       Current Outpatient Prescriptions:     aspirin 325 mg tablet, Take 1 tablet by mouth, Disp: , Rfl:     ciprofloxacin-dexamethasone (CIPRODEX) otic suspension, Administer 4 drops into the left ear 2 (two) times a day (Patient not taking: Reported on 2019 ), Disp: 7 5 mL, Rfl: 1    furosemide (LASIX) 20 mg tablet, Take 1 tablet (20 mg total) by mouth daily (Patient not taking: Reported on 2019 ), Disp: 7 tablet, Rfl: 1    ibuprofen (MOTRIN) 800 mg tablet, Take 1 tablet by mouth every 8 (eight) hours, Disp: , Rfl:     Omega-3 1000 MG CAPS, Take by mouth, Disp: , Rfl:     Red Yeast Rice 600 MG CAPS, Take by mouth, Disp: , Rfl:     triamcinolone (KENALOG) 0 5 % cream, Apply topically 2 (two) times a day, Disp: 30 g, Rfl: 1    Current Allergies     Allergies as of 01/20/2019 - Reviewed 01/20/2019   Allergen Reaction Noted    Iodine Other (See Comments) 12/30/2016            The following portions of the patient's history were reviewed and updated as appropriate: allergies, current medications, past family history, past medical history, past social history, past surgical history and problem list      Past Medical History:   Diagnosis Date    BPH with obstruction/lower urinary tract symptoms     Deep vein thrombosis (DVT) ([facility] [address] )     Erectile dysfunction due to arterial insufficiency     No known health problems     history of denial of any significant medical , no pertinent past medical history per allscripts    Testicular hypogonadism     Varicocele        Past Surgical History:   Procedure Laterality Date    EYE SURGERY      INGUINAL HERNIA REPAIR      UMBILICAL HERNIA REPAIR      per allscripts    VASECTOMY      VEIN LIGATION AND STRIPPING         Family History   Problem Relation Age of Onset    Heart disease Mother     Heart attack Mother     Breast cancer Mother     Hypertension Mother     Cerebral aneurysm Mother     Prostate cancer Father     Bone cancer Father     Diabetes type II Father     Testicular cancer Cousin     Coronary artery disease Maternal Grandmother     Colon cancer Paternal Grandmother          Medications have been verified  Objective   /88   Pulse 103   Temp 100 1 °F (37 8 °C)   Resp 18   SpO2 96%        Physical Exam     Physical Exam   Constitutional: He is oriented to person, place, and time  He appears well-developed and well-nourished  No distress     HENT: Head: Normocephalic and atraumatic  Right Ear: External ear normal    Left Ear: External ear normal    Mouth/Throat: Posterior oropharyngeal edema and posterior oropharyngeal erythema present  No oropharyngeal exudate  Eyes: Pupils are equal, round, and reactive to light  Conjunctivae and EOM are normal    Neck: Normal range of motion  Neck supple  Cardiovascular: Normal rate, regular rhythm and normal heart sounds  Pulmonary/Chest: Effort normal and breath sounds normal  No respiratory distress  He has no wheezes  He has no rales  Abdominal: Soft  Bowel sounds are normal    Lymphadenopathy:     He has cervical adenopathy  Neurological: He is alert and oriented to person, place, and time  He has normal reflexes  Skin: Skin is warm and dry  He is not diaphoretic  Psychiatric: He has a normal mood and affect  His behavior is normal  Judgment and thought content normal    Nursing note and vitals reviewed

## 2019-01-20 NOTE — PATIENT INSTRUCTIONS

## 2019-01-22 LAB — BACTERIA THROAT CULT: NORMAL

## 2019-01-24 DIAGNOSIS — Z00.00 ROUTINE ADULT HEALTH MAINTENANCE: Primary | ICD-10-CM

## 2019-02-11 ENCOUNTER — TRANSCRIBE ORDERS (OUTPATIENT)
Dept: LAB | Facility: CLINIC | Age: 49
End: 2019-02-11

## 2019-02-11 ENCOUNTER — APPOINTMENT (OUTPATIENT)
Dept: LAB | Facility: CLINIC | Age: 49
End: 2019-02-11
Payer: COMMERCIAL

## 2019-02-11 DIAGNOSIS — Z00.00 ROUTINE ADULT HEALTH MAINTENANCE: ICD-10-CM

## 2019-02-11 DIAGNOSIS — Z00.00 ROUTINE ADULT HEALTH MAINTENANCE: Primary | ICD-10-CM

## 2019-02-11 LAB
ALBUMIN SERPL BCP-MCNC: 3.7 G/DL (ref 3.5–5)
ALP SERPL-CCNC: 69 U/L (ref 46–116)
ALT SERPL W P-5'-P-CCNC: 65 U/L (ref 12–78)
ANION GAP SERPL CALCULATED.3IONS-SCNC: 3 MMOL/L (ref 4–13)
AST SERPL W P-5'-P-CCNC: 31 U/L (ref 5–45)
BASOPHILS # BLD AUTO: 0.06 THOUSANDS/ΜL (ref 0–0.1)
BASOPHILS NFR BLD AUTO: 1 % (ref 0–1)
BILIRUB SERPL-MCNC: 0.37 MG/DL (ref 0.2–1)
BUN SERPL-MCNC: 23 MG/DL (ref 5–25)
CALCIUM SERPL-MCNC: 8.7 MG/DL (ref 8.3–10.1)
CHLORIDE SERPL-SCNC: 109 MMOL/L (ref 100–108)
CHOLEST SERPL-MCNC: 178 MG/DL (ref 50–200)
CO2 SERPL-SCNC: 27 MMOL/L (ref 21–32)
CREAT SERPL-MCNC: 0.96 MG/DL (ref 0.6–1.3)
EOSINOPHIL # BLD AUTO: 0.22 THOUSAND/ΜL (ref 0–0.61)
EOSINOPHIL NFR BLD AUTO: 3 % (ref 0–6)
ERYTHROCYTE [DISTWIDTH] IN BLOOD BY AUTOMATED COUNT: 12.7 % (ref 11.6–15.1)
GFR SERPL CREATININE-BSD FRML MDRD: 92 ML/MIN/1.73SQ M
GLUCOSE P FAST SERPL-MCNC: 90 MG/DL (ref 65–99)
HCT VFR BLD AUTO: 45.4 % (ref 36.5–49.3)
HDLC SERPL-MCNC: 47 MG/DL (ref 40–60)
HGB BLD-MCNC: 15.1 G/DL (ref 12–17)
IMM GRANULOCYTES # BLD AUTO: 0.03 THOUSAND/UL (ref 0–0.2)
IMM GRANULOCYTES NFR BLD AUTO: 0 % (ref 0–2)
LDLC SERPL CALC-MCNC: 117 MG/DL (ref 0–100)
LYMPHOCYTES # BLD AUTO: 1.93 THOUSANDS/ΜL (ref 0.6–4.47)
LYMPHOCYTES NFR BLD AUTO: 28 % (ref 14–44)
MCH RBC QN AUTO: 31.3 PG (ref 26.8–34.3)
MCHC RBC AUTO-ENTMCNC: 33.3 G/DL (ref 31.4–37.4)
MCV RBC AUTO: 94 FL (ref 82–98)
MONOCYTES # BLD AUTO: 0.83 THOUSAND/ΜL (ref 0.17–1.22)
MONOCYTES NFR BLD AUTO: 12 % (ref 4–12)
NEUTROPHILS # BLD AUTO: 3.94 THOUSANDS/ΜL (ref 1.85–7.62)
NEUTS SEG NFR BLD AUTO: 56 % (ref 43–75)
NONHDLC SERPL-MCNC: 131 MG/DL
NRBC BLD AUTO-RTO: 0 /100 WBCS
PLATELET # BLD AUTO: 215 THOUSANDS/UL (ref 149–390)
PMV BLD AUTO: 9.5 FL (ref 8.9–12.7)
POTASSIUM SERPL-SCNC: 4.4 MMOL/L (ref 3.5–5.3)
PROT SERPL-MCNC: 7.3 G/DL (ref 6.4–8.2)
PSA SERPL-MCNC: 0.8 NG/ML (ref 0–4)
RBC # BLD AUTO: 4.83 MILLION/UL (ref 3.88–5.62)
SODIUM SERPL-SCNC: 139 MMOL/L (ref 136–145)
TRIGL SERPL-MCNC: 72 MG/DL
TSH SERPL DL<=0.05 MIU/L-ACNC: 3.35 UIU/ML (ref 0.36–3.74)
WBC # BLD AUTO: 7.01 THOUSAND/UL (ref 4.31–10.16)

## 2019-02-11 PROCEDURE — 36415 COLL VENOUS BLD VENIPUNCTURE: CPT

## 2019-02-11 PROCEDURE — G0103 PSA SCREENING: HCPCS

## 2019-02-11 PROCEDURE — 80061 LIPID PANEL: CPT

## 2019-02-11 PROCEDURE — 80053 COMPREHEN METABOLIC PANEL: CPT

## 2019-02-11 PROCEDURE — 85025 COMPLETE CBC W/AUTO DIFF WBC: CPT

## 2019-02-11 PROCEDURE — 84443 ASSAY THYROID STIM HORMONE: CPT

## 2019-02-15 ENCOUNTER — OFFICE VISIT (OUTPATIENT)
Dept: FAMILY MEDICINE CLINIC | Facility: CLINIC | Age: 49
End: 2019-02-15
Payer: COMMERCIAL

## 2019-02-15 VITALS
DIASTOLIC BLOOD PRESSURE: 88 MMHG | WEIGHT: 256.8 LBS | HEART RATE: 100 BPM | HEIGHT: 73 IN | OXYGEN SATURATION: 98 % | SYSTOLIC BLOOD PRESSURE: 156 MMHG | BODY MASS INDEX: 34.03 KG/M2 | TEMPERATURE: 98.2 F

## 2019-02-15 DIAGNOSIS — Z00.00 ANNUAL PHYSICAL EXAM: Primary | ICD-10-CM

## 2019-02-15 DIAGNOSIS — E66.09 CLASS 1 OBESITY DUE TO EXCESS CALORIES WITH SERIOUS COMORBIDITY AND BODY MASS INDEX (BMI) OF 33.0 TO 33.9 IN ADULT: ICD-10-CM

## 2019-02-15 DIAGNOSIS — E78.00 HYPERCHOLESTEROLEMIA: ICD-10-CM

## 2019-02-15 DIAGNOSIS — B07.0 PLANTAR WARTS: ICD-10-CM

## 2019-02-15 PROCEDURE — 99396 PREV VISIT EST AGE 40-64: CPT | Performed by: FAMILY MEDICINE

## 2019-02-15 NOTE — PROGRESS NOTES
Assessment/Plan:    No problem-specific Assessment & Plan notes found for this encounter  Diagnoses and all orders for this visit:    Annual physical exam  -     CBC and differential; Future  -     Comprehensive metabolic panel; Future  -     Lipid panel; Future  -     TSH, 3rd generation with Free T4 reflex; Future  -     PSA, Total Screen; Future    Hypercholesterolemia    Class 1 obesity due to excess calories with serious comorbidity and body mass index (BMI) of 33 0 to 33 9 in adult  Comments:  pt counseled on diet and exercise    Plantar warts  -     Ambulatory referral to Podiatry; Future        BMI Counseling: Body mass index is 33 88 kg/m²  Discussed the patient's BMI with him  The BMI is above average  BMI counseling and education was provided to the patient  Nutrition recommendations include reducing portion sizes, decreasing overall calorie intake, 3-5 servings of fruits/vegetables daily, reducing fast food intake, consuming healthier snacks, decreasing soda and/or juice intake, moderation in carbohydrate intake, increasing intake of lean protein, reducing intake of saturated fat and trans fat and reducing intake of cholesterol  Exercise recommendations include moderate aerobic physical activity for 150 minutes/week and exercising 3-5 times per week  Subjective:      Patient ID: Elbert Rodriguez is a 46 y o  male  Pt here for annual physical      The following portions of the patient's history were reviewed and updated as appropriate: allergies, current medications, past family history, past medical history, past social history, past surgical history and problem list     Review of Systems   Constitutional: Positive for fatigue  Negative for chills and fever  HENT: Negative  Eyes: Negative  Respiratory: Negative for shortness of breath and wheezing  Cardiovascular: Negative for chest pain and palpitations     Gastrointestinal: Negative for abdominal pain, blood in stool, constipation, diarrhea, nausea and vomiting  Endocrine: Negative  Genitourinary: Negative for difficulty urinating and dysuria  Musculoskeletal: Negative for arthralgias and myalgias  Skin: Negative  Allergic/Immunologic: Negative  Neurological: Negative for seizures and syncope  Hematological: Negative for adenopathy  Psychiatric/Behavioral: Negative  Objective:      /88   Pulse 100   Temp 98 2 °F (36 8 °C) (Tympanic)   Ht 6' 1" (1 854 m)   Wt 116 kg (256 lb 12 8 oz)   SpO2 98%   BMI 33 88 kg/m²          Physical Exam   Constitutional: He is oriented to person, place, and time  He appears well-developed and well-nourished  No distress  HENT:   Head: Normocephalic and atraumatic  Eyes: Pupils are equal, round, and reactive to light  Conjunctivae and EOM are normal  No scleral icterus  Neck: Normal range of motion  Neck supple  Cardiovascular: Normal rate, regular rhythm and normal heart sounds  No murmur heard  Pulmonary/Chest: Effort normal and breath sounds normal  No respiratory distress  He has no wheezes  He has no rales  Abdominal: Soft  Bowel sounds are normal  He exhibits no distension and no mass  There is no tenderness  There is no rebound and no guarding  Musculoskeletal: He exhibits no edema  Lymphadenopathy:     He has no cervical adenopathy  Neurological: He is alert and oriented to person, place, and time  Skin: Skin is warm and dry  He is not diaphoretic  Psychiatric: He has a normal mood and affect  His behavior is normal  Judgment and thought content normal    Nursing note and vitals reviewed

## 2019-04-12 ENCOUNTER — OFFICE VISIT (OUTPATIENT)
Dept: UROLOGY | Facility: MEDICAL CENTER | Age: 49
End: 2019-04-12
Payer: COMMERCIAL

## 2019-04-12 VITALS
HEART RATE: 90 BPM | SYSTOLIC BLOOD PRESSURE: 132 MMHG | BODY MASS INDEX: 31.81 KG/M2 | HEIGHT: 73 IN | DIASTOLIC BLOOD PRESSURE: 80 MMHG | WEIGHT: 240 LBS

## 2019-04-12 DIAGNOSIS — N50.89 TESTICULAR MICROLITHIASIS: ICD-10-CM

## 2019-04-12 DIAGNOSIS — N52.9 VASCULOGENIC ERECTILE DYSFUNCTION, UNSPECIFIED VASCULOGENIC ERECTILE DYSFUNCTION TYPE: ICD-10-CM

## 2019-04-12 DIAGNOSIS — N13.8 BPH WITH URINARY OBSTRUCTION: Primary | ICD-10-CM

## 2019-04-12 DIAGNOSIS — N40.1 BPH WITH URINARY OBSTRUCTION: Primary | ICD-10-CM

## 2019-04-12 LAB
SL AMB  POCT GLUCOSE, UA: NORMAL
SL AMB LEUKOCYTE ESTERASE,UA: NORMAL
SL AMB POCT BILIRUBIN,UA: NORMAL
SL AMB POCT BLOOD,UA: NORMAL
SL AMB POCT CLARITY,UA: CLEAR
SL AMB POCT COLOR,UA: YELLOW
SL AMB POCT KETONES,UA: NORMAL
SL AMB POCT NITRITE,UA: NORMAL
SL AMB POCT PH,UA: 5.5
SL AMB POCT SPECIFIC GRAVITY,UA: 1.02
SL AMB POCT URINE PROTEIN: NORMAL
SL AMB POCT UROBILINOGEN: 0.2

## 2019-04-12 PROCEDURE — 99214 OFFICE O/P EST MOD 30 MIN: CPT | Performed by: UROLOGY

## 2019-04-12 PROCEDURE — 81003 URINALYSIS AUTO W/O SCOPE: CPT | Performed by: UROLOGY

## 2019-04-12 RX ORDER — LISINOPRIL 10 MG/1
10 TABLET ORAL
COMMUNITY
End: 2019-04-18 | Stop reason: SDUPTHER

## 2019-04-12 RX ORDER — FENOPROFEN CALCIUM 400 MG/1
CAPSULE ORAL
Refills: 3 | COMMUNITY
Start: 2019-03-19 | End: 2019-11-22 | Stop reason: ALTCHOICE

## 2019-04-12 RX ORDER — SILDENAFIL 50 MG/1
50 TABLET, FILM COATED ORAL DAILY PRN
Qty: 30 TABLET | Refills: 1 | Status: SHIPPED | OUTPATIENT
Start: 2019-04-12 | End: 2020-07-02

## 2019-04-18 ENCOUNTER — OFFICE VISIT (OUTPATIENT)
Dept: FAMILY MEDICINE CLINIC | Facility: CLINIC | Age: 49
End: 2019-04-18
Payer: COMMERCIAL

## 2019-04-18 VITALS
WEIGHT: 248.2 LBS | SYSTOLIC BLOOD PRESSURE: 156 MMHG | BODY MASS INDEX: 32.89 KG/M2 | OXYGEN SATURATION: 99 % | HEART RATE: 81 BPM | TEMPERATURE: 97.2 F | DIASTOLIC BLOOD PRESSURE: 98 MMHG | HEIGHT: 73 IN

## 2019-04-18 DIAGNOSIS — I10 BENIGN ESSENTIAL HYPERTENSION: ICD-10-CM

## 2019-04-18 DIAGNOSIS — H60.392 OTHER INFECTIVE ACUTE OTITIS EXTERNA OF LEFT EAR: ICD-10-CM

## 2019-04-18 DIAGNOSIS — J30.1 SEASONAL ALLERGIC RHINITIS DUE TO POLLEN: ICD-10-CM

## 2019-04-18 DIAGNOSIS — H92.02 LEFT EAR PAIN: Primary | ICD-10-CM

## 2019-04-18 PROCEDURE — 99213 OFFICE O/P EST LOW 20 MIN: CPT | Performed by: NURSE PRACTITIONER

## 2019-04-18 RX ORDER — AMOXICILLIN 500 MG/1
500 TABLET, FILM COATED ORAL 2 TIMES DAILY
Qty: 14 TABLET | Refills: 0 | Status: SHIPPED | OUTPATIENT
Start: 2019-04-18 | End: 2019-04-25

## 2019-04-18 RX ORDER — AMOXICILLIN 500 MG/1
500 TABLET, FILM COATED ORAL 2 TIMES DAILY
Qty: 14 TABLET | Refills: 0 | Status: SHIPPED | OUTPATIENT
Start: 2019-04-18 | End: 2019-04-18 | Stop reason: SDUPTHER

## 2019-04-18 RX ORDER — MONTELUKAST SODIUM 10 MG/1
10 TABLET ORAL
Qty: 30 TABLET | Refills: 3 | Status: SHIPPED | OUTPATIENT
Start: 2019-04-18 | End: 2021-01-12

## 2019-04-18 RX ORDER — LISINOPRIL 10 MG/1
10 TABLET ORAL DAILY
Qty: 90 TABLET | Refills: 1 | Status: SHIPPED | OUTPATIENT
Start: 2019-04-18 | End: 2020-05-19 | Stop reason: SDUPTHER

## 2019-05-13 ENCOUNTER — OFFICE VISIT (OUTPATIENT)
Dept: FAMILY MEDICINE CLINIC | Facility: CLINIC | Age: 49
End: 2019-05-13
Payer: COMMERCIAL

## 2019-05-13 VITALS
DIASTOLIC BLOOD PRESSURE: 82 MMHG | BODY MASS INDEX: 33.11 KG/M2 | WEIGHT: 249.8 LBS | SYSTOLIC BLOOD PRESSURE: 124 MMHG | TEMPERATURE: 98.1 F | HEIGHT: 73 IN

## 2019-05-13 DIAGNOSIS — H92.02 LEFT EAR PAIN: Primary | ICD-10-CM

## 2019-05-13 PROCEDURE — 99213 OFFICE O/P EST LOW 20 MIN: CPT | Performed by: FAMILY MEDICINE

## 2019-05-13 RX ORDER — METHYLPREDNISOLONE 4 MG/1
TABLET ORAL
Qty: 21 EACH | Refills: 0 | Status: SHIPPED | OUTPATIENT
Start: 2019-05-13 | End: 2019-05-30 | Stop reason: ALTCHOICE

## 2019-05-15 ENCOUNTER — OFFICE VISIT (OUTPATIENT)
Dept: URGENT CARE | Facility: CLINIC | Age: 49
End: 2019-05-15
Payer: COMMERCIAL

## 2019-05-15 VITALS
TEMPERATURE: 98.2 F | SYSTOLIC BLOOD PRESSURE: 149 MMHG | RESPIRATION RATE: 18 BRPM | DIASTOLIC BLOOD PRESSURE: 93 MMHG | HEART RATE: 87 BPM | OXYGEN SATURATION: 98 %

## 2019-05-15 DIAGNOSIS — M79.662 PAIN OF LEFT CALF: Primary | ICD-10-CM

## 2019-05-15 PROCEDURE — G0382 LEV 3 HOSP TYPE B ED VISIT: HCPCS | Performed by: NURSE PRACTITIONER

## 2019-05-30 ENCOUNTER — OFFICE VISIT (OUTPATIENT)
Dept: FAMILY MEDICINE CLINIC | Facility: CLINIC | Age: 49
End: 2019-05-30
Payer: COMMERCIAL

## 2019-05-30 ENCOUNTER — TELEPHONE (OUTPATIENT)
Dept: INTERNAL MEDICINE CLINIC | Facility: CLINIC | Age: 49
End: 2019-05-30

## 2019-05-30 VITALS
DIASTOLIC BLOOD PRESSURE: 82 MMHG | SYSTOLIC BLOOD PRESSURE: 126 MMHG | OXYGEN SATURATION: 98 % | WEIGHT: 249.8 LBS | TEMPERATURE: 98.6 F | BODY MASS INDEX: 33.11 KG/M2 | HEIGHT: 73 IN | HEART RATE: 91 BPM

## 2019-05-30 DIAGNOSIS — H92.02 LEFT EAR PAIN: Primary | ICD-10-CM

## 2019-05-30 DIAGNOSIS — Z13.31 DEPRESSION SCREENING NEGATIVE: ICD-10-CM

## 2019-05-30 DIAGNOSIS — H66.90 ACUTE OTITIS MEDIA, UNSPECIFIED OTITIS MEDIA TYPE: ICD-10-CM

## 2019-05-30 PROCEDURE — 99213 OFFICE O/P EST LOW 20 MIN: CPT | Performed by: NURSE PRACTITIONER

## 2019-05-30 RX ORDER — SULFAMETHOXAZOLE AND TRIMETHOPRIM 800; 160 MG/1; MG/1
1 TABLET ORAL EVERY 12 HOURS SCHEDULED
Qty: 14 TABLET | Refills: 0 | Status: SHIPPED | OUTPATIENT
Start: 2019-05-30 | End: 2019-06-06

## 2019-06-05 ENCOUNTER — OFFICE VISIT (OUTPATIENT)
Dept: FAMILY MEDICINE CLINIC | Facility: CLINIC | Age: 49
End: 2019-06-05
Payer: COMMERCIAL

## 2019-06-05 VITALS
DIASTOLIC BLOOD PRESSURE: 90 MMHG | BODY MASS INDEX: 31.08 KG/M2 | WEIGHT: 250 LBS | HEIGHT: 75 IN | SYSTOLIC BLOOD PRESSURE: 128 MMHG | TEMPERATURE: 100.1 F

## 2019-06-05 DIAGNOSIS — H92.02 LEFT EAR PAIN: ICD-10-CM

## 2019-06-05 DIAGNOSIS — R60.0 LOCALIZED EDEMA: Primary | ICD-10-CM

## 2019-06-05 PROCEDURE — 3008F BODY MASS INDEX DOCD: CPT | Performed by: FAMILY MEDICINE

## 2019-06-05 PROCEDURE — 1036F TOBACCO NON-USER: CPT | Performed by: FAMILY MEDICINE

## 2019-06-05 PROCEDURE — 99213 OFFICE O/P EST LOW 20 MIN: CPT | Performed by: FAMILY MEDICINE

## 2019-06-05 RX ORDER — FUROSEMIDE 20 MG/1
20 TABLET ORAL DAILY
Qty: 10 TABLET | Refills: 0 | Status: SHIPPED | OUTPATIENT
Start: 2019-06-05 | End: 2019-11-22 | Stop reason: ALTCHOICE

## 2019-06-05 RX ORDER — METHYLPREDNISOLONE 4 MG/1
TABLET ORAL
Qty: 21 EACH | Refills: 0 | Status: SHIPPED | OUTPATIENT
Start: 2019-06-05 | End: 2019-11-22 | Stop reason: ALTCHOICE

## 2019-07-26 ENCOUNTER — OFFICE VISIT (OUTPATIENT)
Dept: FAMILY MEDICINE CLINIC | Facility: CLINIC | Age: 49
End: 2019-07-26
Payer: COMMERCIAL

## 2019-07-26 VITALS
WEIGHT: 241 LBS | DIASTOLIC BLOOD PRESSURE: 78 MMHG | TEMPERATURE: 96.8 F | BODY MASS INDEX: 29.97 KG/M2 | SYSTOLIC BLOOD PRESSURE: 120 MMHG | HEIGHT: 75 IN

## 2019-07-26 DIAGNOSIS — M79.604 PAIN OF RIGHT LOWER EXTREMITY: Primary | ICD-10-CM

## 2019-07-26 DIAGNOSIS — I83.812 VARICOSE VEINS OF LEFT LOWER EXTREMITY WITH PAIN: ICD-10-CM

## 2019-07-26 PROCEDURE — 3008F BODY MASS INDEX DOCD: CPT | Performed by: FAMILY MEDICINE

## 2019-07-26 PROCEDURE — 99213 OFFICE O/P EST LOW 20 MIN: CPT | Performed by: FAMILY MEDICINE

## 2019-07-26 PROCEDURE — 1036F TOBACCO NON-USER: CPT | Performed by: FAMILY MEDICINE

## 2019-07-26 NOTE — PROGRESS NOTES
Assessment/Plan:    No problem-specific Assessment & Plan notes found for this encounter  There are no diagnoses linked to this encounter  PHQ-9 Depression Screening    PHQ-9:    Frequency of the following problems over the past two weeks:       Little interest or pleasure in doing things:  0 - not at all  Feeling down, depressed, or hopeless:  0 - not at all  PHQ-2 Score:  0            Subjective:      Patient ID: Pili Villeda is a 46 y o  male  Pt complains of right leg pain below the knee with bruising, pt fell 2 weeks ago, it is slowly getting better, pt also has the underlying problem of painful varicosity in both lower extremities      The following portions of the patient's history were reviewed and updated as appropriate: allergies, current medications, past family history, past medical history, past social history, past surgical history and problem list     Review of Systems   Constitutional: Negative for chills and fever  Respiratory: Negative for shortness of breath and wheezing  Objective:    /78   Temp (!) 96 8 °F (36 °C)   Ht 6' 2 5" (1 892 m)   Wt 109 kg (241 lb)   BMI 30 53 kg/m²      Physical Exam   Constitutional: He is oriented to person, place, and time  He appears well-developed and well-nourished  No distress  HENT:   Head: Normocephalic and atraumatic  Eyes: Pupils are equal, round, and reactive to light  Conjunctivae and EOM are normal  No scleral icterus  Neck: Normal range of motion  Neck supple  Cardiovascular: Normal rate, regular rhythm and normal heart sounds  No murmur heard  Pulmonary/Chest: Effort normal and breath sounds normal  No respiratory distress  He has no wheezes  He has no rales  Musculoskeletal: He exhibits no edema  Bruised shin right LE   Lymphadenopathy:     He has no cervical adenopathy  Neurological: He is alert and oriented to person, place, and time  Skin: Skin is warm and dry  He is not diaphoretic  Psychiatric: He has a normal mood and affect  His behavior is normal  Judgment and thought content normal    Nursing note and vitals reviewed

## 2019-11-22 ENCOUNTER — CONSULT (OUTPATIENT)
Dept: VASCULAR SURGERY | Facility: CLINIC | Age: 49
End: 2019-11-22
Payer: COMMERCIAL

## 2019-11-22 VITALS
DIASTOLIC BLOOD PRESSURE: 82 MMHG | HEART RATE: 72 BPM | SYSTOLIC BLOOD PRESSURE: 124 MMHG | BODY MASS INDEX: 30.03 KG/M2 | HEIGHT: 74 IN | WEIGHT: 234 LBS

## 2019-11-22 DIAGNOSIS — I83.893 SYMPTOMATIC VARICOSE VEINS OF BOTH LOWER EXTREMITIES: Primary | ICD-10-CM

## 2019-11-22 DIAGNOSIS — Z86.718 HISTORY OF DVT (DEEP VEIN THROMBOSIS): ICD-10-CM

## 2019-11-22 DIAGNOSIS — I83.812 VARICOSE VEINS OF LEFT LOWER EXTREMITY WITH PAIN: ICD-10-CM

## 2019-11-22 DIAGNOSIS — E78.5 HYPERLIPIDEMIA, UNSPECIFIED HYPERLIPIDEMIA TYPE: ICD-10-CM

## 2019-11-22 DIAGNOSIS — I10 BENIGN ESSENTIAL HYPERTENSION: ICD-10-CM

## 2019-11-22 PROCEDURE — 99204 OFFICE O/P NEW MOD 45 MIN: CPT | Performed by: PHYSICIAN ASSISTANT

## 2019-11-22 NOTE — PROGRESS NOTES
Assessment/Plan:    Symptomatic varicose veins of both lower extremities  45 y/o M nonsmoker w/hx HTN, HLD, BPH, provoked LLE DVT '10 after vein stripping, recurrent RLE superficial thrombophlebitis and symptomatic BLE varicose veins, s/p LLE vein ligation and stripping '10 and R [facility] '[facility] who presents with recurrent BLE symptoms, L>R, w/reticular/truncal varicosities  Patient wears compression stockings daily for over 10 years   -continue conservative measures to include daily use of compression stockings, lower extremity elevation, low-sodium diet, aerobic activity, weight management and skin moisturization  -will obtain updated LEVDR for reassessment underlying venous system  -return to office with surgeon with [facility] for re-evaluation and discussion of surgical options  -instructed to contact the office in the interim with any questions, concerns or new symptoms    Benign essential hypertension  -BP well controlled  -continue current medical regimen  -management per PCP    Hyperlipidemia  -stable  -continue lifestyle and diet modifications  -management per PCP       Diagnoses and all orders for this visit:    Symptomatic varicose veins of both lower extremities  -     Compression Stocking  -     VAS reflux lower limb venous duplex study with reflux assessment, complete bilateral; Future    Benign essential hypertension    Hyperlipidemia, unspecified hyperlipidemia type    History of DVT (deep vein thrombosis)    Varicose veins of left lower extremity with pain  -     Ambulatory referral to Vascular Surgery          Subjective:      Patient ID: Antonio Polanco is a 44 y o  male  Pt is here as a new VV pt, referred by Dr Wendi Burgos  Pt C/o of BL LE swelling and pain after standing on his feet for 8 + hours a day at work  Pt C/o of bulging veins and burning pains in BL LE veins for 8 years  Pt currently wears compression stockings  X10 hours a day for 5  Days a week   Pt does elevate legs in PM   Pt has HX of Vein Stripping of the L leg back in 2010 and GSV on the R leg back in 2013     45 y/o M nonsmoker w/hx HTN, HLD, BPH, provoked LLE DVT '10 after vein stripping, recurrent RLE superficial thrombophlebitis and symptomatic BLE varicose veins, s/p LLE vein ligation and stripping '10 and R GSV ablation '13 [facility] who presents with recurrent BLE symptoms w/reticular/truncal varicosities, L>R  Patient with longstanding history of symptomatic varicose veins with superficial venous incompetence has undergone bilateral venous intervention as noted at [facility] '10 and [facility] '[facility]  The patient's symptoms resolved after R GSV ablation but recurred 2 years ago bilaterally  Patient complains of bilateral lower extremity edema, heaviness, aching, burning which is exacerbated by standing for prolonged periods of time  Patient wears compression stockings daily for over 10 years  He works 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  Compression helps relieve his symptoms but continue to persist despite use of compression stockings  Patient reports a strong family history for varicose veins in his father and VTE in his mother  Patient underwent hypercoagulable workup after development of postoperative DVT and left upper extremity superficial thrombophlebitis in 2010  Workup negative for hypercoagulable disorder  Patient denies recurrent DVT, PE, bleeding varicosities, stasis dermatitis, recurrent lower extremity cellulitis or venous ulcerations  The following portions of the patient's history were reviewed and updated as appropriate: allergies, current medications, past family history, past medical history, past social history, past surgical history and problem list     Review of Systems   Eyes: Negative  Respiratory: Negative  Cardiovascular: Positive for leg swelling  Painful veins   Endocrine: Negative  Genitourinary: Negative  Musculoskeletal:        Leg pain      Skin: Negative  Allergic/Immunologic: Positive for environmental allergies  Neurological: Negative  Hematological: Bruises/bleeds easily  Psychiatric/Behavioral: Negative  I have reviewed and made appropriate changes to the review of systems input by the medical assistant      Vitals:    11/22/19 1115   BP: 124/82   BP Location: Right arm   Patient Position: Sitting   Pulse: 72   Weight: 106 kg (234 lb)   Height: 6' 2" (1 88 m)       Patient Active Problem List   Diagnosis    Male erectile dysfunction    Leg pain    Right testicular pain    Acute maxillary sinusitis    Acute sinusitis    Allergic rhinitis due to pollen    Benign colon polyp    Benign essential hypertension    Benign localized hyperplasia of prostate with urinary obstruction and lower urinary tract symptoms    Breast hypertrophy    Chest pain    Cough    External hemorrhoids    Foot pain    Hypercholesterolemia    Hyperlipidemia    Hypothyroidism    Low back pain    Neck pain    Plantar wart of left foot    Plantar wart of right foot    Rib pain    Serous otitis media    Sinus drainage    Slow transit constipation    Smell disturbance    Symptomatic varicose veins of both lower extremities    Visual disturbances    History of DVT (deep vein thrombosis)    Right groin pain    Testicular microlithiasis    BPH with urinary obstruction    Left ear pain       Past Surgical History:   Procedure Laterality Date    EYE SURGERY      INGUINAL HERNIA REPAIR      UMBILICAL HERNIA REPAIR      per allscripts    VASECTOMY      VEIN LIGATION AND STRIPPING         Family History   Problem Relation Age of Onset    Heart disease Mother     Heart attack Mother     Breast cancer Mother     Hypertension Mother     Cerebral aneurysm Mother     Prostate cancer Father     Bone cancer Father     Diabetes type II Father     Testicular cancer Cousin     Coronary artery disease Maternal Grandmother     Colon cancer Paternal Grandmother     No Known Problems Son     No Known Problems Son        Social History     Socioeconomic History    Marital status: /Civil Union     Spouse name: Not on file    Number of children: 2    Years of education: Not on file    Highest education level: Not on file   Occupational History    Occupation:    Social Needs    Financial resource strain: Not on file    Food insecurity:     Worry: Not on file     Inability: Not on file    Transportation needs:     Medical: Not on file     Non-medical: Not on file   Tobacco Use    Smoking status: Never Smoker    Smokeless tobacco: Never Used    Tobacco comment: per allscripts   Substance and Sexual Activity    Alcohol use: Yes     Comment: rare, social, never drank alcohol per allscripts    Drug use: No    Sexual activity: Not on file   Lifestyle    Physical activity:     Days per week: Not on file     Minutes per session: Not on file    Stress: Not on file   Relationships    Social connections:     Talks on phone: Not on file     Gets together: Not on file     Attends Jain service: Not on file     Active member of club or organization: Not on file     Attends meetings of clubs or organizations: Not on file     Relationship status: Not on file    Intimate partner violence:     Fear of current or ex partner: Not on file     Emotionally abused: Not on file     Physically abused: Not on file     Forced sexual activity: Not on file   Other Topics Concern    Not on file   Social History Narrative    Not on file       Allergies   Allergen Reactions    Penicillins     Iodine Other (See Comments)     Mild allergic like reaction to iohexol (self limited sensation of throat tightening, not requiring treatment)            Current Outpatient Medications:     aspirin 325 mg tablet, Take 1 tablet by mouth, Disp: , Rfl:     ibuprofen (MOTRIN) 800 mg tablet, Take 1 tablet by mouth every 8 (eight) hours, Disp: , Rfl:    lisinopril (ZESTRIL) 10 mg tablet, Take 1 tablet (10 mg total) by mouth daily, Disp: 90 tablet, Rfl: 1    montelukast (SINGULAIR) 10 mg tablet, Take 1 tablet (10 mg total) by mouth daily at bedtime, Disp: 30 tablet, Rfl: 3    Omega-3 1000 MG CAPS, Take by mouth, Disp: , Rfl:     Red Yeast Rice 600 MG CAPS, Take by mouth, Disp: , Rfl:     sildenafil (VIAGRA) 50 MG tablet, Take 1 tablet (50 mg total) by mouth daily as needed for erectile dysfunction (Patient not taking: Reported on 11/22/2019), Disp: 30 tablet, Rfl: 1     Objective:  Imaging studies:  No vascular imaging studies available for review    /82 (BP Location: Right arm, Patient Position: Sitting)   Pulse 72   Ht 6' 2" (1 88 m)   Wt 106 kg (234 lb)   BMI 30 04 kg/m²          Physical Exam   Constitutional: He is oriented to person, place, and time  He appears well-developed and well-nourished  No distress  HENT:   Head: Normocephalic and atraumatic  Eyes: Pupils are equal, round, and reactive to light  Conjunctivae and EOM are normal  No scleral icterus  Neck: Normal range of motion  Neck supple  No JVD present  Carotid bruit is not present  No tracheal deviation present  No thyromegaly present  Cardiovascular: Normal rate, regular rhythm, S1 normal and S2 normal  Exam reveals no gallop, no S3 and no friction rub  No murmur heard  Pulses:       Radial pulses are 2+ on the right side, and 2+ on the left side  Dorsalis pedis pulses are 2+ on the right side, and 2+ on the left side  Pulmonary/Chest: Breath sounds normal  No stridor  No respiratory distress  He has no wheezes  He has no rhonchi  He has no rales  Abdominal: Soft  Bowel sounds are normal  He exhibits no distension, no abdominal bruit, no pulsatile midline mass and no mass  There is no hepatosplenomegaly  There is no tenderness  There is no rebound  Musculoskeletal: Normal range of motion  He exhibits edema  He exhibits no deformity     Reticular varicosities noted bilateral lower extremities, R>L  Superficial bruising noted on right anterior thigh and medial shin  No lipodermatosclerosis  1+ edema noted bilateral lower extremities  No tissue loss or venous ulcers   Neurological: He is alert and oriented to person, place, and time  He has normal strength  Skin: Skin is warm, dry and intact  No lesion noted  No cyanosis or erythema  No pallor  Psychiatric: He has a normal mood and affect  Nursing note and vitals reviewed

## 2019-11-22 NOTE — ASSESSMENT & PLAN NOTE
43 y/o M nonsmoker w/hx HTN, HLD, BPH, provoked LLE DVT '10 after vein stripping, recurrent RLE superficial thrombophlebitis and symptomatic BLE varicose veins, s/p LLE vein ligation and stripping '10 and R GSV ablation '13 @[facility] who presents with recurrent BLE symptoms, L>R, w/reticular/truncal varicosities    Patient wears compression stockings daily for over 10 years   -continue conservative measures to include daily use of compression stockings, lower extremity elevation, low-sodium diet, aerobic activity, weight management and skin moisturization  -will obtain updated LEVDR for reassessment underlying venous system  -return to office with surgeon with [facility] for re-evaluation and discussion of surgical options  -instructed to contact the office in the interim with any questions, concerns or new symptoms

## 2019-11-22 NOTE — PATIENT INSTRUCTIONS
Varicose Veins   WHAT YOU NEED TO KNOW:   What are varicose veins? Varicose veins are veins that become large, twisted, and swollen  They are common on the back of the calves, knees, and thighs  Varicose veins are caused by valves in your veins that do not work properly  This causes blood to collect and increase pressure in the veins of your legs  The increased pressure causes your veins to stretch, get larger, swell, and twist        What increases my risk for varicose veins? · Pregnancy    · A family history of varicose veins    · Being overweight or obese    · Age 48 years or older    · Sitting or standing for long periods of time    · Wearing tight clothing  What are the signs and symptoms of varicose veins? Your symptoms may be worse after you stand or sit for long periods of time  You may have any of the following:  · Blue, purple, or bulging veins in your legs     · Pain, swelling, or muscle cramps in your legs    · Feeling of fatigue or heaviness in your legs  How are varicose veins diagnosed? Your healthcare provider will examine your legs and ask about your medical history  You may need tests, such as a Doppler ultrasound or duplex scan  These tests show your veins and valves, and how your blood is flowing through them  These tests may also show if there is a blockage or blood clot  How are varicose veins treated? The goal of treatment is to decrease symptoms, improve appearance, and prevent further problems  Treatment will depend on which veins are affected and how severe your condition is  You may need procedures to treat or remove your varicose veins  For example, your healthcare provider may inject a solution or use a laser to close the varicose veins  Surgery to remove long veins may also be done  Ask your healthcare provider for more information about procedures used to treat varicose veins  What can I do to manage my symptoms? · Do not sit or stand for long periods of time    This can cause the blood to collect in your legs and make your symptoms worse  Bend or rotate your ankles several times every hour  Walk around for a few minutes every hour to get blood moving in your legs  · Do not cross your legs when you sit  This decreases blood flow to your feet and can make your symptoms worse  · Do not wear tight clothing or shoes  Do not wear high-heeled shoes  Do not wear clothes that are tight around the waist or knees  · Maintain a healthy weight  Being overweight or obese can make your varicose veins worse  Ask your healthcare provider how much you should weigh  Ask him or her to help you create a weight loss plan if you are overweight  · Wear pressure stockings as directed  The stockings are tight and put pressure on your legs  They improve blood flow and help prevent clots  · Elevate your legs  Keep them above the level of your heart for 15 to 30 minutes several times a day  You can also prop the end of your bed up slightly to elevate your legs while you sleep  This will help blood to flow back to your heart  · Get regular exercise  Talk to your healthcare provider about the best exercise plan for you  Exercise can improve blood flow to your legs and feet  When should I seek immediate care? · You have a wound that does not heal or is infected  · You have an injury that has broken your skin and caused your varicose veins to bleed  · Your leg is swollen and hard  · You notice that your legs or feet are turning blue or black  · Your leg feels warm, tender, and painful  It may look swollen and red  When should I contact my healthcare provider? · You have pain in your leg that does not go away or gets worse  · You notice sudden large bruising on your legs  · You have a rash on your leg  · Your symptoms keep you from doing your daily activities  · You have questions or concerns about your condition or care    CARE AGREEMENT:   You have the right to help plan your care  Learn about your health condition and how it may be treated  Discuss treatment options with your caregivers to decide what care you want to receive  You always have the right to refuse treatment  The above information is an  only  It is not intended as medical advice for individual conditions or treatments  Talk to your doctor, nurse or pharmacist before following any medical regimen to see if it is safe and effective for you  © 2017 [employer]  Information is for End User's use only and may not be sold, redistributed or otherwise used for commercial purposes  All illustrations and images included in CareNotes® are the copyrighted property of [employer]  or [employer]      -continue conservative measures to include daily use of prescription compression stockings, leg elevation, low-salt diet, aerobic activity, weight management and lotion to leg to help promote good skin health  -place your compression stockings on in the morning and remove prior to bed  -we will schedule you for a lower extremity venous reflux study to assess the function of your valves in your veins to help guide treatment options  -return to office with surgeon after reflux study for re-evaluation   -please contact the office in the interim with any questions, concerns or new symptoms

## 2019-12-13 ENCOUNTER — HOSPITAL ENCOUNTER (OUTPATIENT)
Dept: NON INVASIVE DIAGNOSTICS | Facility: HOSPITAL | Age: 49
Discharge: HOME | End: 2019-12-13
Payer: COMMERCIAL

## 2019-12-13 DIAGNOSIS — I83.893 SYMPTOMATIC VARICOSE VEINS OF BOTH LOWER EXTREMITIES: ICD-10-CM

## 2019-12-13 PROCEDURE — 93970 EXTREMITY STUDY: CPT

## 2019-12-14 PROCEDURE — 93970 EXTREMITY STUDY: CPT | Performed by: SURGERY

## 2020-02-07 ENCOUNTER — OFFICE VISIT (OUTPATIENT)
Dept: URGENT CARE | Facility: CLINIC | Age: 50
End: 2020-02-07
Payer: COMMERCIAL

## 2020-02-07 VITALS
HEIGHT: 73 IN | HEART RATE: 96 BPM | BODY MASS INDEX: 30.09 KG/M2 | DIASTOLIC BLOOD PRESSURE: 61 MMHG | WEIGHT: 227 LBS | TEMPERATURE: 98.1 F | OXYGEN SATURATION: 98 % | SYSTOLIC BLOOD PRESSURE: 156 MMHG | RESPIRATION RATE: 18 BRPM

## 2020-02-07 DIAGNOSIS — R35.0 FREQUENCY OF MICTURITION: ICD-10-CM

## 2020-02-07 DIAGNOSIS — R39.9 UTI SYMPTOMS: Primary | ICD-10-CM

## 2020-02-07 LAB
SL AMB  POCT GLUCOSE, UA: NEGATIVE
SL AMB LEUKOCYTE ESTERASE,UA: NEGATIVE
SL AMB POCT BILIRUBIN,UA: NEGATIVE
SL AMB POCT BLOOD,UA: NEGATIVE
SL AMB POCT CLARITY,UA: CLEAR
SL AMB POCT COLOR,UA: NORMAL
SL AMB POCT KETONES,UA: NEGATIVE
SL AMB POCT NITRITE,UA: NEGATIVE
SL AMB POCT PH,UA: 5
SL AMB POCT SPECIFIC GRAVITY,UA: 1.03
SL AMB POCT URINE PROTEIN: NEGATIVE
SL AMB POCT UROBILINOGEN: NEGATIVE

## 2020-02-07 PROCEDURE — 87086 URINE CULTURE/COLONY COUNT: CPT | Performed by: EMERGENCY MEDICINE

## 2020-02-07 PROCEDURE — G0382 LEV 3 HOSP TYPE B ED VISIT: HCPCS | Performed by: EMERGENCY MEDICINE

## 2020-02-07 PROCEDURE — 81002 URINALYSIS NONAUTO W/O SCOPE: CPT | Performed by: EMERGENCY MEDICINE

## 2020-02-07 NOTE — PATIENT INSTRUCTIONS
1   A urine culture is pending in 48 hours  Please call for results  2   Follow up with your urologist for recheck of your prostate    3   Increase your fluid intake

## 2020-02-07 NOTE — PROGRESS NOTES
[facility]        NAME: Joselo Marrero is a 44 y o  male  : 1970    MRN: [id number]  DATE: 2020  TIME: 9:16 AM    Assessment and Plan   UTI symptoms [R39 9]  1  UTI symptoms  POCT urine dip    Urine culture   2  Frequency of micturition       Urine dip:  SG 1 030  Urine culture pending    Patient Instructions     Patient Instructions       1  A urine culture is pending in 48 hours  Please call for results  2   Follow up with your urologist for recheck of your prostate  3   Increase your fluid intake        Follow up with PCP in 3-5 days  Proceed to  ER if symptoms worsen  Chief Complaint     Chief Complaint   Patient presents with    Possible UTI     Patient c/o urinating and feeling like bladder not emptying x 2 days         History of Present Illness       This is a 78-year-old male who presents with 2 days sensation of not having an empty bladder after voiding  He states he is voiding in small amounts  He has had no blood, no back pain, no fever with this  He does states that he is followed by his urologist on a yearly basis with PSA counts  All of which have been normal     Patient admits to drinking minimal amounts of water and states he drinks approximately 6 cups of coffee per day  Review of Systems   Review of Systems   Constitutional: Negative for chills and fever  HENT: Negative for congestion  Gastrointestinal: Negative for abdominal distention, abdominal pain, diarrhea, nausea and vomiting  Genitourinary: Positive for frequency  Negative for discharge, dysuria, enuresis, flank pain, hematuria and urgency  Musculoskeletal: Negative for myalgias  Skin: Negative for rash           Current Medications       Current Outpatient Medications:     aspirin 325 mg tablet, Take 1 tablet by mouth, Disp: , Rfl:     lisinopril (ZESTRIL) 10 mg tablet, Take 1 tablet (10 mg total) by mouth daily, Disp: 90 tablet, Rfl: 1    montelukast (SINGULAIR) 10 mg tablet, Take 1 tablet (10 mg total) by mouth daily at bedtime, Disp: 30 tablet, Rfl: 3    Omega-3 1000 MG CAPS, Take by mouth, Disp: , Rfl:     Red Yeast Rice 600 MG CAPS, Take by mouth, Disp: , Rfl:     ibuprofen (MOTRIN) 800 mg tablet, Take 1 tablet by mouth every 8 (eight) hours, Disp: , Rfl:     sildenafil (VIAGRA) 50 MG tablet, Take 1 tablet (50 mg total) by mouth daily as needed for erectile dysfunction (Patient not taking: Reported on 11/22/2019), Disp: 30 tablet, Rfl: 1    Current Allergies     Allergies as of 02/07/2020 - Reviewed 02/07/2020   Allergen Reaction Noted    Penicillins  05/30/2019    Iodine Other (See Comments) 12/30/2016            The following portions of the patient's history were reviewed and updated as appropriate: allergies, current medications, past family history, past medical history, past social history, past surgical history and problem list      Past Medical History:   Diagnosis Date    BPH with obstruction/lower urinary tract symptoms     Deep vein thrombosis (DVT) ([facility] [address] )     Erectile dysfunction due to arterial insufficiency     Hypertension     No known health problems     history of denial of any significant medical , no pertinent past medical history per allscripts    Testicular hypogonadism     Varicocele        Past Surgical History:   Procedure Laterality Date    EYE SURGERY      INGUINAL HERNIA REPAIR      UMBILICAL HERNIA REPAIR      per allscripts    VASECTOMY      VEIN LIGATION AND STRIPPING         Family History   Problem Relation Age of Onset    Heart disease Mother     Heart attack Mother     Breast cancer Mother     Hypertension Mother     Cerebral aneurysm Mother     Prostate cancer Father     Bone cancer Father     Diabetes type II Father     Testicular cancer Cousin     Coronary artery disease Maternal Grandmother     Colon cancer Paternal Grandmother     No Known Problems Son     No Known Problems Son          Medications have been verified  Objective   /61   Pulse 96   Temp 98 1 °F (36 7 °C) (Tympanic)   Resp 18   Ht 6' 1" (1 854 m)   Wt 103 kg (227 lb)   SpO2 (!) 18%   BMI 29 95 kg/m²        Physical Exam     Physical Exam   Constitutional: He is oriented to person, place, and time  He appears well-developed and well-nourished  HENT:   Head: Normocephalic and atraumatic  Eyes: Pupils are equal, round, and reactive to light  EOM are normal    Neck: Normal range of motion  Neck supple  Cardiovascular: Normal rate  Pulmonary/Chest: Effort normal    Abdominal: Soft  Musculoskeletal: Normal range of motion  Neurological: He is alert and oriented to person, place, and time  Skin: Skin is warm and dry  Nursing note and vitals reviewed

## 2020-02-08 LAB — BACTERIA UR CULT: NORMAL

## 2020-03-03 ENCOUNTER — OFFICE VISIT (OUTPATIENT)
Dept: VASCULAR SURGERY | Facility: CLINIC | Age: 50
End: 2020-03-03
Payer: COMMERCIAL

## 2020-03-03 VITALS
TEMPERATURE: 96.9 F | DIASTOLIC BLOOD PRESSURE: 102 MMHG | SYSTOLIC BLOOD PRESSURE: 154 MMHG | HEART RATE: 83 BPM | WEIGHT: 242 LBS | HEIGHT: 73 IN | BODY MASS INDEX: 32.07 KG/M2

## 2020-03-03 DIAGNOSIS — I83.893 VARICOSE VEINS OF LEG WITH SWELLING, BILATERAL: Primary | ICD-10-CM

## 2020-03-03 PROCEDURE — 3077F SYST BP >= 140 MM HG: CPT | Performed by: SURGERY

## 2020-03-03 PROCEDURE — 1036F TOBACCO NON-USER: CPT | Performed by: SURGERY

## 2020-03-03 PROCEDURE — 99213 OFFICE O/P EST LOW 20 MIN: CPT | Performed by: SURGERY

## 2020-03-03 PROCEDURE — 3080F DIAST BP >= 90 MM HG: CPT | Performed by: SURGERY

## 2020-03-03 PROCEDURE — 3008F BODY MASS INDEX DOCD: CPT | Performed by: SURGERY

## 2020-03-03 NOTE — LETTER
March 3, 2020     Joesph Luu, [address] [place] [id number]    Patient: Radha Fortune   YOB: 1970   Date of Visit: 3/3/2020       Dear Dr Jovany Ozuna:    Thank you for referring Greg Burnett to me for evaluation  Below are my notes for this consultation  If you have questions, please do not hesitate to call me  I look forward to following your patient along with you           Sincerely,        Katelyn Denney MD        CC: No Recipients

## 2020-03-03 NOTE — PROGRESS NOTES
Assessment/Plan:      Concern over bilateral leg swelling from Podiatry, history of ablation is bilaterally has developed some varicosities in the right calf otherwise is doing well with custom compression stockings and is extremely compliant in this regard  There is no evidence of obvious venous reflux disease however there is some modest swelling bilaterally  There may be an element of lymphedema as well  Plan:  Continue with custom compression stockings if swelling does worsen over the next 4-6 months of the to see him back in the office which time we will discuss the possibility of using a venous/lymphatic compression device full leg  Diagnoses and all orders for this visit:    Varicose veins of leg with swelling, bilateral        Subjective:      Patient ID: Paige Aase is a 44 y o  male  Pt is here today to review results of LEVDR done 12/13/2019  Pt has a hx of LLE vein stripping done 2010 by Dr Gregory Pretty  Pt had right leg GSV ablation done in 2013, OSH  Pt has c/o swelling in his both leg, left is worse than the right  He c/o the feeling of fatigue in his legs  Pt denies any open wounds  Pt has been compression stockings for at least 10 years  Pt is taking  mg  HPI    The following portions of the patient's history were reviewed and updated as appropriate: allergies, current medications, past family history, past medical history, past social history, past surgical history and problem list     Review of Systems   Constitutional: Negative  HENT: Negative  Eyes: Negative  Respiratory: Negative  Cardiovascular: Positive for leg swelling  Gastrointestinal: Negative  Endocrine: Negative  Genitourinary: Negative  Musculoskeletal: Positive for back pain  Skin: Negative  Allergic/Immunologic: Positive for environmental allergies  Neurological: Negative  Hematological: Negative  Psychiatric/Behavioral: Negative  Objective:       There were no vitals taken for this visit  Physical Exam          Oriented x3 no evidence of clinical depression  Eyes:  Sclera non-icteric    Skin: normal without evidence of inflammation    Neck is supple carotid pulses equal bilaterally no bruits heard    Chest lungs clear, heart regular rhythm  Abdomen soft nontender no evidence of pulsatile masses  Pulses are palpable bilateral Femoral  Popliteal, DP and PT  Bilateral leg swelling with moderate edema bilaterally, unlikely to be lymphedema    Neurological exam intact cranial nerves 2-12 grossly intact no gross motor sensory deficits detected  Imaging viewed and reviewed with Patient    I have reviewed and made appropriate changes to the review of systems input by the medical assistant      Vitals:    03/03/20 1512   BP: (!) 154/102   BP Location: Right arm   Patient Position: Sitting   Cuff Size: Standard   Pulse: 83   Temp: (!) 96 9 °F (36 1 °C)   TempSrc: Tympanic   Weight: 110 kg (242 lb)   Height: 6' 1" (1 854 m)       Patient Active Problem List   Diagnosis    Male erectile dysfunction    Leg pain    Right testicular pain    Acute maxillary sinusitis    Acute sinusitis    Allergic rhinitis due to pollen    Benign colon polyp    Benign essential hypertension    Benign localized hyperplasia of prostate with urinary obstruction and lower urinary tract symptoms    Breast hypertrophy    Chest pain    Cough    External hemorrhoids    Foot pain    Hypercholesterolemia    Hyperlipidemia    Hypothyroidism    Low back pain    Neck pain    Plantar wart of left foot    Plantar wart of right foot    Rib pain    Serous otitis media    Sinus drainage    Slow transit constipation    Smell disturbance    Symptomatic varicose veins of both lower extremities    Visual disturbances    History of DVT (deep vein thrombosis)    Right groin pain    Testicular microlithiasis    BPH with urinary obstruction    Left ear pain    Varicose veins of leg with swelling, bilateral       Past Surgical History:   Procedure Laterality Date    EYE SURGERY      INGUINAL HERNIA REPAIR      UMBILICAL HERNIA REPAIR      per allscripts    VASECTOMY      VEIN LIGATION AND STRIPPING         Family History   Problem Relation Age of Onset    Heart disease Mother     [facility] attack Mother    [facility] Breast cancer Mother     Hypertension Mother     Cerebral aneurysm Mother     Prostate cancer Father     Bone cancer Father     Diabetes type II Father     Testicular cancer Cousin     Coronary artery disease Maternal Grandmother     Colon cancer Paternal Grandmother     No Known Problems Son     No Known Problems Son        Social History     Socioeconomic History    Marital status: /Civil Union     Spouse name: Not on file    Number of children: 2    Years of education: Not on file    Highest education level: Not on file   Occupational History    Occupation:    Social Needs    Financial resource strain: Not on file    Food insecurity:     Worry: Not on file     Inability: Not on file    Transportation needs:     Medical: Not on file     Non-medical: Not on file   Tobacco Use    Smoking status: Never Smoker    Smokeless tobacco: Never Used    Tobacco comment: per allscripts   Substance and Sexual Activity    Alcohol use: Yes     Comment: rare, social, never drank alcohol per allscripts    Drug use: No    Sexual activity: Not on file   Lifestyle    Physical activity:     Days per week: Not on file     Minutes per session: Not on file    Stress: Not on file   Relationships    Social connections:     Talks on phone: Not on file     Gets together: Not on file     Attends Ca[employer] service: Not on file     Active member of club or organization: Not on file     Attends meetings of clubs or organizations: Not on file     Relationship status: Not on file    Intimate partner violence:     Fear of current or ex partner: Not on file Emotionally abused: Not on file     Physically abused: Not on file     Forced sexual activity: Not on file   Other Topics Concern    Not on file   Social History Narrative    Not on file       Allergies   Allergen Reactions    Penicillins     Iodine Other (See Comments)     Mild allergic like reaction to iohexol (self limited sensation of throat tightening, not requiring treatment)            Current Outpatient Medications:     aspirin 325 mg tablet, Take 1 tablet by mouth, Disp: , Rfl:     ibuprofen (MOTRIN) 800 mg tablet, Take 1 tablet by mouth every 8 (eight) hours, Disp: , Rfl:     lisinopril (ZESTRIL) 10 mg tablet, Take 1 tablet (10 mg total) by mouth daily, Disp: 90 tablet, Rfl: 1    montelukast (SINGULAIR) 10 mg tablet, Take 1 tablet (10 mg total) by mouth daily at bedtime, Disp: 30 tablet, Rfl: 3    Omega-3 1000 MG CAPS, Take by mouth, Disp: , Rfl:     Red Yeast Rice 600 MG CAPS, Take by mouth, Disp: , Rfl:     sildenafil (VIAGRA) 50 MG tablet, Take 1 tablet (50 mg total) by mouth daily as needed for erectile dysfunction (Patient not taking: Reported on 11/22/2019), Disp: 30 tablet, Rfl: 1

## 2020-03-03 NOTE — PATIENT INSTRUCTIONS
Concern over bilateral leg swelling from Podiatry, history of ablation is bilaterally has developed some varicosities in the right calf otherwise is doing well with custom compression stockings and is extremely compliant in this regard  There is no evidence of obvious venous reflux disease however there is some modest swelling bilaterally  There may be an element of lymphedema as well  Plan:  Continue with custom compression stockings if swelling does worsen over the next 4-6 months of the to see him back in the office which time we will discuss the possibility of using a venous/lymphatic compression device full leg

## 2020-03-09 ENCOUNTER — TRANSCRIBE ORDERS (OUTPATIENT)
Dept: FAMILY MEDICINE CLINIC | Facility: CLINIC | Age: 50
End: 2020-03-09

## 2020-03-09 DIAGNOSIS — Z12.5 SCREENING PSA (PROSTATE SPECIFIC ANTIGEN): ICD-10-CM

## 2020-03-09 DIAGNOSIS — Z00.00 ROUTINE ADULT HEALTH MAINTENANCE: Primary | ICD-10-CM

## 2020-03-10 ENCOUNTER — APPOINTMENT (OUTPATIENT)
Dept: LAB | Facility: CLINIC | Age: 50
End: 2020-03-10
Payer: COMMERCIAL

## 2020-03-10 DIAGNOSIS — Z00.00 ROUTINE ADULT HEALTH MAINTENANCE: ICD-10-CM

## 2020-03-10 LAB
ALBUMIN SERPL BCP-MCNC: 3.8 G/DL (ref 3.5–5)
ALP SERPL-CCNC: 63 U/L (ref 46–116)
ALT SERPL W P-5'-P-CCNC: 27 U/L (ref 12–78)
ANION GAP SERPL CALCULATED.3IONS-SCNC: 2 MMOL/L (ref 4–13)
AST SERPL W P-5'-P-CCNC: 14 U/L (ref 5–45)
BASOPHILS # BLD AUTO: 0.06 THOUSANDS/ΜL (ref 0–0.1)
BASOPHILS NFR BLD AUTO: 1 % (ref 0–1)
BILIRUB SERPL-MCNC: 0.51 MG/DL (ref 0.2–1)
BUN SERPL-MCNC: 25 MG/DL (ref 5–25)
CALCIUM SERPL-MCNC: 8.8 MG/DL (ref 8.3–10.1)
CHLORIDE SERPL-SCNC: 107 MMOL/L (ref 100–108)
CHOLEST SERPL-MCNC: 201 MG/DL (ref 50–200)
CO2 SERPL-SCNC: 28 MMOL/L (ref 21–32)
CREAT SERPL-MCNC: 1.02 MG/DL (ref 0.6–1.3)
EOSINOPHIL # BLD AUTO: 0.49 THOUSAND/ΜL (ref 0–0.61)
EOSINOPHIL NFR BLD AUTO: 7 % (ref 0–6)
ERYTHROCYTE [DISTWIDTH] IN BLOOD BY AUTOMATED COUNT: 12.3 % (ref 11.6–15.1)
GFR SERPL CREATININE-BSD FRML MDRD: 85 ML/MIN/1.73SQ M
GLUCOSE P FAST SERPL-MCNC: 101 MG/DL (ref 65–99)
HCT VFR BLD AUTO: 46 % (ref 36.5–49.3)
HDLC SERPL-MCNC: 50 MG/DL
HGB BLD-MCNC: 15.4 G/DL (ref 12–17)
IMM GRANULOCYTES # BLD AUTO: 0.01 THOUSAND/UL (ref 0–0.2)
IMM GRANULOCYTES NFR BLD AUTO: 0 % (ref 0–2)
LDLC SERPL CALC-MCNC: 138 MG/DL (ref 0–100)
LYMPHOCYTES # BLD AUTO: 1.72 THOUSANDS/ΜL (ref 0.6–4.47)
LYMPHOCYTES NFR BLD AUTO: 24 % (ref 14–44)
MCH RBC QN AUTO: 31.9 PG (ref 26.8–34.3)
MCHC RBC AUTO-ENTMCNC: 33.5 G/DL (ref 31.4–37.4)
MCV RBC AUTO: 95 FL (ref 82–98)
MONOCYTES # BLD AUTO: 0.83 THOUSAND/ΜL (ref 0.17–1.22)
MONOCYTES NFR BLD AUTO: 12 % (ref 4–12)
NEUTROPHILS # BLD AUTO: 4.09 THOUSANDS/ΜL (ref 1.85–7.62)
NEUTS SEG NFR BLD AUTO: 56 % (ref 43–75)
NONHDLC SERPL-MCNC: 151 MG/DL
NRBC BLD AUTO-RTO: 0 /100 WBCS
PLATELET # BLD AUTO: 206 THOUSANDS/UL (ref 149–390)
PMV BLD AUTO: 9.6 FL (ref 8.9–12.7)
POTASSIUM SERPL-SCNC: 4.3 MMOL/L (ref 3.5–5.3)
PROT SERPL-MCNC: 6.9 G/DL (ref 6.4–8.2)
PSA SERPL-MCNC: 0.6 NG/ML (ref 0–4)
RBC # BLD AUTO: 4.83 MILLION/UL (ref 3.88–5.62)
SODIUM SERPL-SCNC: 137 MMOL/L (ref 136–145)
TRIGL SERPL-MCNC: 64 MG/DL
TSH SERPL DL<=0.05 MIU/L-ACNC: 4.53 UIU/ML (ref 0.36–3.74)
WBC # BLD AUTO: 7.2 THOUSAND/UL (ref 4.31–10.16)

## 2020-03-10 PROCEDURE — 36415 COLL VENOUS BLD VENIPUNCTURE: CPT

## 2020-03-10 PROCEDURE — 80061 LIPID PANEL: CPT

## 2020-03-10 PROCEDURE — 85025 COMPLETE CBC W/AUTO DIFF WBC: CPT

## 2020-03-10 PROCEDURE — 84443 ASSAY THYROID STIM HORMONE: CPT

## 2020-03-10 PROCEDURE — 80053 COMPREHEN METABOLIC PANEL: CPT

## 2020-03-10 PROCEDURE — G0103 PSA SCREENING: HCPCS

## 2020-03-13 ENCOUNTER — OFFICE VISIT (OUTPATIENT)
Dept: FAMILY MEDICINE CLINIC | Facility: CLINIC | Age: 50
End: 2020-03-13
Payer: COMMERCIAL

## 2020-03-13 VITALS
HEIGHT: 72 IN | DIASTOLIC BLOOD PRESSURE: 89 MMHG | OXYGEN SATURATION: 96 % | SYSTOLIC BLOOD PRESSURE: 120 MMHG | WEIGHT: 236 LBS | TEMPERATURE: 96.8 F | HEART RATE: 92 BPM | BODY MASS INDEX: 31.97 KG/M2

## 2020-03-13 DIAGNOSIS — E66.09 CLASS 1 OBESITY DUE TO EXCESS CALORIES WITH SERIOUS COMORBIDITY AND BODY MASS INDEX (BMI) OF 32.0 TO 32.9 IN ADULT: ICD-10-CM

## 2020-03-13 DIAGNOSIS — B07.0 PLANTAR WART OF RIGHT FOOT: ICD-10-CM

## 2020-03-13 DIAGNOSIS — Z13.31 NEGATIVE DEPRESSION SCREENING: ICD-10-CM

## 2020-03-13 DIAGNOSIS — Z11.4 SCREENING FOR HIV WITHOUT PRESENCE OF RISK FACTORS: ICD-10-CM

## 2020-03-13 DIAGNOSIS — I10 BENIGN ESSENTIAL HYPERTENSION: ICD-10-CM

## 2020-03-13 DIAGNOSIS — Z00.00 ANNUAL PHYSICAL EXAM: Primary | ICD-10-CM

## 2020-03-13 DIAGNOSIS — Z23 ENCOUNTER FOR IMMUNIZATION: ICD-10-CM

## 2020-03-13 PROBLEM — E66.811 CLASS 1 OBESITY DUE TO EXCESS CALORIES WITH SERIOUS COMORBIDITY AND BODY MASS INDEX (BMI) OF 32.0 TO 32.9 IN ADULT: Status: ACTIVE | Noted: 2020-03-13

## 2020-03-13 PROCEDURE — 90472 IMMUNIZATION ADMIN EACH ADD: CPT

## 2020-03-13 PROCEDURE — 90715 TDAP VACCINE 7 YRS/> IM: CPT

## 2020-03-13 PROCEDURE — 90750 HZV VACC RECOMBINANT IM: CPT

## 2020-03-13 PROCEDURE — 99396 PREV VISIT EST AGE 40-64: CPT | Performed by: FAMILY MEDICINE

## 2020-03-13 PROCEDURE — 3008F BODY MASS INDEX DOCD: CPT | Performed by: FAMILY MEDICINE

## 2020-03-13 PROCEDURE — 90471 IMMUNIZATION ADMIN: CPT

## 2020-03-13 NOTE — PROGRESS NOTES
Assessment/Plan:    No problem-specific Assessment & Plan notes found for this encounter  Diagnoses and all orders for this visit:    Annual physical exam    Screening for HIV without presence of risk factors  -     HIV 1/2 Antigen/Antibody (4th Generation) w Reflex [facility]; Future    Encounter for immunization  -     TDAP VACCINE GREATER THAN OR EQUAL TO 8YO IM  -     Zoster Vaccine Recombinant IM    Benign essential hypertension    Class 1 obesity due to excess calories with serious comorbidity and body mass index (BMI) of 32 0 to 32 9 in adult  Comments:  pt counseled on diet and exercise    Negative depression screening          PHQ-9 Depression Screening    PHQ-9:    Frequency of the following problems over the past two weeks:       Little interest or pleasure in doing things:  0 - not at all  Feeling down, depressed, or hopeless:  0 - not at all  PHQ-2 Score:  0        BMI Counseling: Body mass index is 32 01 kg/m²  The BMI is above normal  Nutrition recommendations include reducing portion sizes and 3-5 servings of fruits/vegetables daily  Exercise recommendations include moderate aerobic physical activity for 150 minutes/week and exercising 3-5 times per week  Subjective:      Patient ID: Antonio Polanco is a 44 y o  male  Pt here for annual physical      The following portions of the patient's history were reviewed and updated as appropriate: allergies, current medications, past family history, past medical history, past social history, past surgical history and problem list     Review of Systems   Constitutional: Negative  Negative for chills, fatigue and fever  HENT: Negative  Eyes: Negative  Respiratory: Negative for shortness of breath and wheezing  Cardiovascular: Negative for chest pain and palpitations  Gastrointestinal: Negative for abdominal pain, blood in stool, constipation, diarrhea, nausea and vomiting  Endocrine: Negative      Genitourinary: Negative for difficulty urinating and dysuria  Musculoskeletal: Negative for arthralgias and myalgias  Skin: Negative  Allergic/Immunologic: Negative  Neurological: Negative for seizures and syncope  Hematological: Negative for adenopathy  Psychiatric/Behavioral: Negative  Objective:    /89   Pulse 92   Temp (!) 96 8 °F (36 °C)   Ht 6' (1 829 m)   Wt 107 kg (236 lb)   SpO2 96%   BMI 32 01 kg/m²      Physical Exam   Constitutional: He is oriented to person, place, and time  He appears well-developed and well-nourished  No distress  HENT:   Head: Normocephalic and atraumatic  Right Ear: External ear normal    Left Ear: External ear normal    Nose: Nose normal    Mouth/Throat: Oropharynx is clear and moist    Eyes: Pupils are equal, round, and reactive to light  Conjunctivae and EOM are normal  No scleral icterus  Neck: Normal range of motion  Neck supple  Cardiovascular: Normal rate, regular rhythm and normal heart sounds  Exam reveals no gallop and no friction rub  No murmur heard  Pulmonary/Chest: Effort normal and breath sounds normal  No respiratory distress  He has no wheezes  He has no rales  Abdominal: Soft  Bowel sounds are normal  He exhibits no distension and no mass  There is no tenderness  There is no rebound and no guarding  Musculoskeletal: Normal range of motion  He exhibits no edema  Lymphadenopathy:     He has no cervical adenopathy  Neurological: He is alert and oriented to person, place, and time  He has normal reflexes  Skin: Skin is warm and dry  He is not diaphoretic  Psychiatric: He has a normal mood and affect   His behavior is normal  Judgment and thought content normal

## 2020-03-19 ENCOUNTER — OFFICE VISIT (OUTPATIENT)
Dept: URGENT CARE | Facility: CLINIC | Age: 50
End: 2020-03-19
Payer: COMMERCIAL

## 2020-03-19 VITALS
DIASTOLIC BLOOD PRESSURE: 80 MMHG | SYSTOLIC BLOOD PRESSURE: 132 MMHG | OXYGEN SATURATION: 97 % | TEMPERATURE: 97.5 F | WEIGHT: 230 LBS | RESPIRATION RATE: 18 BRPM | BODY MASS INDEX: 31.15 KG/M2 | HEART RATE: 83 BPM | HEIGHT: 72 IN

## 2020-03-19 DIAGNOSIS — R10.9 INTERMITTENT ABDOMINAL PAIN: Primary | ICD-10-CM

## 2020-03-19 PROCEDURE — G0382 LEV 3 HOSP TYPE B ED VISIT: HCPCS | Performed by: NURSE PRACTITIONER

## 2020-03-19 NOTE — PATIENT INSTRUCTIONS
As we discussed if pain becomes persistent I recommend going directly to the ER  You were agreeable to this plan  At this time I would recommend bland foods clear liquid diet  If no improvement over the next several days follow-up with PCP for recheck as they may want to do outpatient testing  If any symptoms become worse you need to go to the ER

## 2020-03-19 NOTE — PROGRESS NOTES
[facility]        NAME: Joy Marcus is a 44 y o  male  : 1970    MRN: [id number]  DATE: 2020  TIME: 8:59 AM    Assessment and Plan   Intermittent abdominal pain [R10 9]  1  Intermittent abdominal pain           Patient Instructions     Patient Instructions   As we discussed if pain becomes persistent I recommend going directly to the ER  You were agreeable to this plan  At this time I would recommend bland foods clear liquid diet  If no improvement over the next several days follow-up with PCP for recheck as they may want to do outpatient testing  If any symptoms become worse you need to go to the ER  Chief Complaint     Chief Complaint   Patient presents with    Abdominal Pain     x 1 day , LBM 3/18/20, COLTEN kitchen         History of Present Illness   Joy Marcus presents to the clinic c/o    This is 70-year-old male here today with complaints of left-sided the abdominal pain  He states started yesterday  He states pain is intermittent he states he had a normal bowel movement yesterday  States that he had some slight diarrhea  He states he has had some nausea but no vomiting  Has decreased appetite  Denies any fevers but chills he rates pain at this time 1/10  He states pain has been coming and going  No history of diverticulitis  Denies any right light pain  Denies any injury and pain is not worsening with straining  Review of Systems   Review of Systems   Constitutional: Positive for activity change and chills  Negative for diaphoresis, fatigue and fever  HENT: Positive for congestion  Negative for rhinorrhea, sinus pressure and sinus pain  Respiratory: Negative for cough and shortness of breath  Cardiovascular: Negative for chest pain  Genitourinary: Negative for discharge  Skin: Negative  Neurological: Negative  Psychiatric/Behavioral: Negative  Negative for dysphoric mood           Current Medications     Long-Term Medications   Medication Sig Dispense Refill    aspirin 325 mg tablet Take 1 tablet by mouth      lisinopril (ZESTRIL) 10 mg tablet Take 1 tablet (10 mg total) by mouth daily 90 tablet 1    montelukast (SINGULAIR) 10 mg tablet Take 1 tablet (10 mg total) by mouth daily at bedtime 30 tablet 3    Omega-3 1000 MG CAPS Take by mouth      ibuprofen (MOTRIN) 800 mg tablet Take 1 tablet by mouth every 8 (eight) hours      sildenafil (VIAGRA) 50 MG tablet Take 1 tablet (50 mg total) by mouth daily as needed for erectile dysfunction (Patient not taking: Reported on 11/22/2019) 30 tablet 1       Current Allergies     Allergies as of 03/19/2020 - Reviewed 03/19/2020   Allergen Reaction Noted    Penicillins  05/30/2019    Iodine Other (See Comments) 12/30/2016            The following portions of the patient's history were reviewed and updated as appropriate: allergies, current medications, past family history, past medical history, past social history, past surgical history and problem list     Objective   /80   Pulse 83   Temp 97 5 °F (36 4 °C) (Tympanic)   Resp 18   Ht 6' (1 829 m)   Wt 104 kg (230 lb)   SpO2 97%   BMI 31 19 kg/m²        Physical Exam     Physical Exam   Constitutional: He is oriented to person, place, and time  Cardiovascular: Normal rate, regular rhythm and normal heart sounds  Pulmonary/Chest: Effort normal and breath sounds normal    Abdominal: Normal appearance  Bowel sounds are increased  There is tenderness (very Mild tenderness left-sided abdomen)  Neurological: He is alert and oriented to person, place, and time  Psychiatric: He has a normal mood and affect  His behavior is normal    Nursing note and vitals reviewed

## 2020-05-18 DIAGNOSIS — J30.1 SEASONAL ALLERGIC RHINITIS DUE TO POLLEN: ICD-10-CM

## 2020-05-18 DIAGNOSIS — I10 BENIGN ESSENTIAL HYPERTENSION: ICD-10-CM

## 2020-05-19 DIAGNOSIS — I10 BENIGN ESSENTIAL HYPERTENSION: ICD-10-CM

## 2020-05-19 DIAGNOSIS — J30.1 SEASONAL ALLERGIC RHINITIS DUE TO POLLEN: ICD-10-CM

## 2020-05-19 RX ORDER — LISINOPRIL 10 MG/1
10 TABLET ORAL DAILY
Qty: 90 TABLET | Refills: 1 | Status: SHIPPED | OUTPATIENT
Start: 2020-05-19 | End: 2021-01-26 | Stop reason: SDUPTHER

## 2020-05-19 RX ORDER — LISINOPRIL 10 MG/1
TABLET ORAL
Qty: 90 TABLET | Refills: 0 | OUTPATIENT
Start: 2020-05-19

## 2020-06-25 ENCOUNTER — OFFICE VISIT (OUTPATIENT)
Dept: URGENT CARE | Facility: CLINIC | Age: 50
End: 2020-06-25
Payer: COMMERCIAL

## 2020-06-25 VITALS
TEMPERATURE: 98.8 F | WEIGHT: 230 LBS | BODY MASS INDEX: 31.15 KG/M2 | OXYGEN SATURATION: 96 % | HEIGHT: 72 IN | DIASTOLIC BLOOD PRESSURE: 79 MMHG | HEART RATE: 102 BPM | SYSTOLIC BLOOD PRESSURE: 132 MMHG | RESPIRATION RATE: 16 BRPM

## 2020-06-25 DIAGNOSIS — H60.502 ACUTE OTITIS EXTERNA OF LEFT EAR, UNSPECIFIED TYPE: Primary | ICD-10-CM

## 2020-06-25 PROCEDURE — G0381 LEV 2 HOSP TYPE B ED VISIT: HCPCS | Performed by: PHYSICIAN ASSISTANT

## 2020-06-25 RX ORDER — IBUPROFEN AND FAMOTIDINE 800; 26.6 MG/1; MG/1
1 TABLET, COATED ORAL 3 TIMES DAILY
COMMUNITY
Start: 2020-06-20 | End: 2021-01-12

## 2020-06-25 RX ORDER — OFLOXACIN 3 MG/ML
10 SOLUTION/ DROPS OPHTHALMIC DAILY
Qty: 5 ML | Refills: 0 | Status: SHIPPED | OUTPATIENT
Start: 2020-06-25 | End: 2020-06-26

## 2020-06-26 DIAGNOSIS — H60.502 ACUTE OTITIS EXTERNA OF LEFT EAR, UNSPECIFIED TYPE: ICD-10-CM

## 2020-06-26 RX ORDER — OFLOXACIN 3 MG/ML
SOLUTION/ DROPS OPHTHALMIC
Qty: 5 ML | Refills: 0 | Status: SHIPPED | OUTPATIENT
Start: 2020-06-26 | End: 2020-09-16 | Stop reason: ALTCHOICE

## 2020-07-02 ENCOUNTER — OFFICE VISIT (OUTPATIENT)
Dept: FAMILY MEDICINE CLINIC | Facility: CLINIC | Age: 50
End: 2020-07-02
Payer: COMMERCIAL

## 2020-07-02 DIAGNOSIS — H92.02 LEFT EAR PAIN: Primary | ICD-10-CM

## 2020-07-02 PROCEDURE — 3075F SYST BP GE 130 - 139MM HG: CPT | Performed by: FAMILY MEDICINE

## 2020-07-02 PROCEDURE — 99213 OFFICE O/P EST LOW 20 MIN: CPT | Performed by: FAMILY MEDICINE

## 2020-07-02 PROCEDURE — 3078F DIAST BP <80 MM HG: CPT | Performed by: FAMILY MEDICINE

## 2020-07-02 PROCEDURE — 1036F TOBACCO NON-USER: CPT | Performed by: FAMILY MEDICINE

## 2020-07-02 RX ORDER — METHYLPREDNISOLONE 4 MG/1
TABLET ORAL
Qty: 21 EACH | Refills: 0 | Status: SHIPPED | OUTPATIENT
Start: 2020-07-02 | End: 2020-08-29 | Stop reason: ALTCHOICE

## 2020-07-02 NOTE — PROGRESS NOTES
Assessment/Plan:    No problem-specific Assessment & Plan notes found for this encounter  Diagnoses and all orders for this visit:    Left ear pain          PHQ-9 Depression Screening    PHQ-9:    Frequency of the following problems over the past two weeks:       Little interest or pleasure in doing things:  0 - not at all  Feeling down, depressed, or hopeless:  0 - not at all  PHQ-2 Score:  0            Subjective:      Patient ID: Ash Nicholson is a 44 y o  male  Earache    There is pain in the left ear  This is a recurrent problem  The current episode started more than 1 year ago  The problem has been waxing and waning  The pain is at a severity of 9/10  Pertinent negatives include no rhinorrhea  He has tried nothing for the symptoms  The treatment provided no relief  The following portions of the patient's history were reviewed and updated as appropriate: allergies, current medications, past family history, past medical history, past social history, past surgical history and problem list     Review of Systems   Constitutional: Negative for chills and fever  HENT: Positive for ear pain  Negative for rhinorrhea, sinus pressure and sinus pain  Objective: There were no vitals taken for this visit  Physical Exam   Constitutional: He is oriented to person, place, and time  He appears well-developed and well-nourished  No distress  HENT:   Head: Normocephalic and atraumatic  Right Ear: Tympanic membrane, external ear and ear canal normal    Left Ear: Tympanic membrane, external ear and ear canal normal    Mouth/Throat: Mucous membranes are normal  No oropharyngeal exudate  [place] OP   Eyes: Right eye exhibits no discharge  Left eye exhibits no discharge  Neck: Normal range of motion  Neck supple  Cardiovascular: Normal rate, regular rhythm and normal heart sounds  No murmur heard  Pulmonary/Chest: Effort normal and breath sounds normal  No stridor   No respiratory distress  He has no wheezes  He has no rales  He exhibits no tenderness  Lymphadenopathy:     He has no cervical adenopathy  Neurological: He is alert and oriented to person, place, and time  Skin: Skin is warm and dry  No rash noted  He is not diaphoretic  Psychiatric: He has a normal mood and affect  His behavior is normal  Judgment and thought content normal    Nursing note and vitals reviewed

## 2020-08-05 ENCOUNTER — OFFICE VISIT (OUTPATIENT)
Dept: URGENT CARE | Facility: CLINIC | Age: 50
End: 2020-08-05
Payer: COMMERCIAL

## 2020-08-05 VITALS
OXYGEN SATURATION: 99 % | DIASTOLIC BLOOD PRESSURE: 92 MMHG | SYSTOLIC BLOOD PRESSURE: 158 MMHG | TEMPERATURE: 97.6 F | RESPIRATION RATE: 18 BRPM | HEART RATE: 80 BPM

## 2020-08-05 DIAGNOSIS — J30.9 ALLERGIC RHINITIS, UNSPECIFIED SEASONALITY, UNSPECIFIED TRIGGER: Primary | ICD-10-CM

## 2020-08-05 PROCEDURE — G0382 LEV 3 HOSP TYPE B ED VISIT: HCPCS | Performed by: PHYSICIAN ASSISTANT

## 2020-08-05 RX ORDER — FLUTICASONE PROPIONATE 50 MCG
2 SPRAY, SUSPENSION (ML) NASAL DAILY
Qty: 1 BOTTLE | Refills: 0 | Status: SHIPPED | OUTPATIENT
Start: 2020-08-05 | End: 2020-09-16 | Stop reason: ALTCHOICE

## 2020-08-05 NOTE — PROGRESS NOTES
[facility]    NAME: Amos Peck is a 44 y o  male  : 1970    MRN: [id number]  DATE: 2020  TIME: 2:38 PM    Assessment and Plan   Allergic rhinitis, unspecified seasonality, unspecified trigger [J30 9]  1  Allergic rhinitis, unspecified seasonality, unspecified trigger  fluticasone (FLONASE) 50 mcg/act nasal spray       Patient Instructions     Patient Instructions   Flonase as directed  Over the counter cough and cold medications as needed  If not improving over the next week, follow up with pcp  Chief Complaint     Chief Complaint   Patient presents with    Cough     Pt c/o cough and congestion on and off  History of Present Illness   [de-identified] year male here with complaint of allergy symptoms, nasal congestion, bilateral ear fullness and pressure  Also noting having a cough which is productive at times  Has been taking oral allergy medicine with no relief  Symptoms have been present for couple weeks  Review of Systems   Review of Systems   Constitutional: Negative for chills, fatigue and fever  HENT: Positive for congestion, postnasal drip and sinus pressure  Negative for ear pain and sore throat  Respiratory: Positive for cough  Negative for shortness of breath and wheezing  Neurological: Positive for headaches  All other systems reviewed and are negative        Current Medications     Current Outpatient Medications:     aspirin 325 mg tablet, Take 1 tablet by mouth, Disp: , Rfl:     DUEXIS 800-26 6 MG TABS, Take 1 tablet by mouth 3 (three) times a day, Disp: , Rfl:     fluticasone (FLONASE) 50 mcg/act nasal spray, 2 sprays into each nostril daily, Disp: 1 Bottle, Rfl: 0    ibuprofen (MOTRIN) 800 mg tablet, Take 1 tablet by mouth every 8 (eight) hours, Disp: , Rfl:     lisinopril (ZESTRIL) 10 mg tablet, Take 1 tablet (10 mg total) by mouth daily, Disp: 90 tablet, Rfl: 1    methylPREDNISolone 4 MG tablet therapy pack, Use as directed on package, Disp: 21 each, Rfl: 0    montelukast (SINGULAIR) 10 mg tablet, Take 1 tablet (10 mg total) by mouth daily at bedtime, Disp: 30 tablet, Rfl: 3    ofloxacin (OCUFLOX) 0 3 % ophthalmic solution, INSTILL 10 DROPS INTO EACH EAR ONCE DAILY FOR 7 DAYS, Disp: 5 mL, Rfl: 0    Omega-3 1000 MG CAPS, Take by mouth, Disp: , Rfl:     Red Yeast Rice 600 MG CAPS, Take by mouth, Disp: , Rfl:     Current Allergies     Allergies as of 08/05/2020 - Reviewed 08/05/2020   Allergen Reaction Noted    Penicillins  05/30/2019    Iodine Other (See Comments) 12/30/2016          The following portions of the patient's history were reviewed and updated as appropriate: allergies, current medications, past family history, past medical history, past social history, past surgical history and problem list    Past Medical History:   Diagnosis Date    BPH with obstruction/lower urinary tract symptoms     Deep vein thrombosis (DVT) ([facility] [address] )     Erectile dysfunction due to arterial insufficiency     Hypertension     No known health problems     history of denial of any significant medical , no pertinent past medical history per allscripts    Testicular hypogonadism     Varicocele      Past Surgical History:   Procedure Laterality Date    EYE SURGERY      INGUINAL HERNIA REPAIR      UMBILICAL HERNIA REPAIR      per allscripts    VASECTOMY      VEIN LIGATION AND STRIPPING       Family History   Problem Relation Age of Onset    Heart disease Mother     Heart attack Mother     Breast cancer Mother     Hypertension Mother     Cerebral aneurysm Mother     Prostate cancer Father     Bone cancer Father     Diabetes type II Father     Testicular cancer Cousin     Coronary artery disease Maternal Grandmother     Colon cancer Paternal Grandmother     No Known Problems Son     No Known Problems Son      Social History     Socioeconomic History    Marital status: /Civil Union     Spouse name: Not on file    Number of children: 2  Years of education: Not on file    Highest education level: Not on file   Occupational History    Occupation:    Social Needs    Financial resource strain: Not on file    Food insecurity     Worry: Not on file     Inability: Not on file    Transportation needs     Medical: Not on file     Non-medical: Not on file   Tobacco Use    Smoking status: Never Smoker    Smokeless tobacco: Never Used    Tobacco comment: per allscripts   Substance and Sexual Activity    Alcohol use: Yes     Comment: rare, social, never drank alcohol per allscripts    Drug use: No    Sexual activity: Not on file   Lifestyle    Physical activity     Days per week: Not on file     Minutes per session: Not on file    Stress: Not on file   Relationships    Social connections     Talks on phone: Not on file     Gets together: Not on file     Attends Holiness service: Not on file     Active member of club or organization: Not on file     Attends meetings of clubs or organizations: Not on file     Relationship status: Not on file    Intimate partner violence     Fear of current or ex partner: Not on file     Emotionally abused: Not on file     Physically abused: Not on file     Forced sexual activity: Not on file   Other Topics Concern    Not on file   Social History Narrative    Not on file     Medications have been verified  Objective   /92   Pulse 80   Temp 97 6 °F (36 4 °C)   Resp 18   SpO2 99%      Physical Exam   Physical Exam   Constitutional: He appears well-developed  No distress  HENT:   Head: Normocephalic and atraumatic  Right Ear: Tympanic membrane normal    Left Ear: Tympanic membrane normal    Nose: Congestion present  No mucosal edema  Neck: Normal range of motion  Cardiovascular: Normal rate, regular rhythm and normal heart sounds  Pulmonary/Chest: Effort normal and breath sounds normal  No respiratory distress  Nursing note and vitals reviewed

## 2020-08-05 NOTE — PATIENT INSTRUCTIONS
Flonase as directed  Over the counter cough and cold medications as needed  If not improving over the next week, follow up with pcp

## 2020-08-29 ENCOUNTER — OFFICE VISIT (OUTPATIENT)
Dept: URGENT CARE | Facility: CLINIC | Age: 50
End: 2020-08-29
Payer: COMMERCIAL

## 2020-08-29 VITALS
RESPIRATION RATE: 18 BRPM | TEMPERATURE: 97.5 F | SYSTOLIC BLOOD PRESSURE: 133 MMHG | BODY MASS INDEX: 31.15 KG/M2 | OXYGEN SATURATION: 98 % | HEART RATE: 89 BPM | WEIGHT: 230 LBS | DIASTOLIC BLOOD PRESSURE: 73 MMHG | HEIGHT: 72 IN

## 2020-08-29 DIAGNOSIS — R30.0 DYSURIA: ICD-10-CM

## 2020-08-29 DIAGNOSIS — R39.9 UTI SYMPTOMS: Primary | ICD-10-CM

## 2020-08-29 LAB
SL AMB  POCT GLUCOSE, UA: NORMAL
SL AMB LEUKOCYTE ESTERASE,UA: NORMAL
SL AMB POCT BILIRUBIN,UA: NORMAL
SL AMB POCT BLOOD,UA: NORMAL
SL AMB POCT CLARITY,UA: CLEAR
SL AMB POCT COLOR,UA: YELLOW
SL AMB POCT KETONES,UA: NORMAL
SL AMB POCT NITRITE,UA: NORMAL
SL AMB POCT PH,UA: 5
SL AMB POCT SPECIFIC GRAVITY,UA: 1.02
SL AMB POCT URINE PROTEIN: NORMAL
SL AMB POCT UROBILINOGEN: 0.2

## 2020-08-29 PROCEDURE — G0382 LEV 3 HOSP TYPE B ED VISIT: HCPCS | Performed by: NURSE PRACTITIONER

## 2020-08-29 PROCEDURE — 87086 URINE CULTURE/COLONY COUNT: CPT | Performed by: NURSE PRACTITIONER

## 2020-08-29 RX ORDER — CIPROFLOXACIN 500 MG/1
500 TABLET, FILM COATED ORAL EVERY 12 HOURS SCHEDULED
Qty: 10 TABLET | Refills: 0 | Status: SHIPPED | OUTPATIENT
Start: 2020-08-29 | End: 2020-09-03

## 2020-08-29 NOTE — PATIENT INSTRUCTIONS
Urine dip is not conclusive for a UTI--this seems similar to the episode Feb 7 which did not grow any bacteria  I will order the antibiotic that you can take if symptoms do not resolve (if you start it, finish it)  It is also ok to treat symptoms with over the counter azo and look for the urine culture results in MyChart  Urinary Tract Infection in Men   AMBULATORY CARE:   A urinary tract infection (UTI)  is caused by bacteria that get inside your urinary tract  Most bacteria that enter your urinary tract come out when you urinate  If the bacteria stay in your urinary tract, you may get an infection  Your urinary tract includes your kidneys, ureters, bladder, and urethra  Urine is made in your kidneys, and it flows from the ureters to the bladder  Urine leaves the bladder through the urethra  A UTI is more common in your lower urinary tract, which includes your bladder and urethra  Common symptoms include the following:   · Urinating more often than usual, leaking urine, or waking from sleep to urinate    · Pain or burning when you urinate    · Pain or pressure in your lower abdomen or back    · Urine that smells bad    · Blood in your urine  Seek care immediately if:   · You are urinating very little or not at all  · You have a high fever with shaking chills  · You have side or back pain that gets worse  Contact your healthcare provider if:   · You have a mild fever  · You do not feel better after 2 days of taking antibiotics  · You are vomiting  · You have new symptoms, such as blood or pus in your urine  · You have questions or concerns about your condition or care  Treatment for a UTI  may include medicines to treat a bacterial infection  You may also need medicines to decrease pain and burning, or decrease the urge to urinate often  Prevent a UTI:   · Empty your bladder often  Urinate and empty your bladder as soon as you feel the need   Do not hold your urine for long periods of time  · Drink liquids as directed  Ask how much liquid to drink each day and which liquids are best for you  You may need to drink more liquids than usual to help flush out the bacteria  Do not drink alcohol, caffeine, or citrus juices  These can irritate your bladder and increase your symptoms  Your healthcare provider may recommend cranberry juice to help prevent a UTI  · Urinate after you have sex  This can help flush out bacteria passed during sex  · Do pelvic muscle exercises often  Pelvic muscle exercises may help you start and stop urinating  Strong pelvic muscles may help you empty your bladder easier  Squeeze these muscles tightly for 5 seconds like you are trying to hold back urine  Then relax for 5 seconds  Gradually work up to squeezing for 10 seconds  Do 3 sets of 15 repetitions a day, or as directed  Follow up with your healthcare provider as directed:  Write down your questions so you remember to ask them during your visits  © 2017 [employer] Information is for End User's use only and may not be sold, redistributed or otherwise used for commercial purposes  All illustrations and images included in CareNotes® are the copyrighted property of [employer]  or [employer]  The above information is an  only  It is not intended as medical advice for individual conditions or treatments  Talk to your doctor, nurse or pharmacist before following any medical regimen to see if it is safe and effective for you

## 2020-08-29 NOTE — PROGRESS NOTES
[facility]        NAME: Catracho Weaver is a 44 y o  male  : 1970    MRN: [id number]  DATE: 2020  TIME: 9:17 AM    Assessment and Plan   UTI symptoms [R39 9]  1  UTI symptoms  ciprofloxacin (CIPRO) 500 mg tablet   2  Dysuria  POCT urine dip auto non-scope    Urine culture         Patient Instructions     Patient Instructions     Urine dip is not conclusive for a UTI--this seems similar to the episode  which did not grow any bacteria  I will order the antibiotic that you can take if symptoms do not resolve (if you start it, finish it)  It is also ok to treat symptoms with over the counter azo and look for the urine culture results in [facility]  Urinary Tract Infection in Men   AMBULATORY CARE:   A urinary tract infection (UTI)  is caused by bacteria that get inside your urinary tract  Most bacteria that enter your urinary tract come out when you urinate  If the bacteria stay in your urinary tract, you may get an infection  Your urinary tract includes your kidneys, ureters, bladder, and urethra  Urine is made in your kidneys, and it flows from the ureters to the bladder  Urine leaves the bladder through the urethra  A UTI is more common in your lower urinary tract, which includes your bladder and urethra  Common symptoms include the following:   · Urinating more often than usual, leaking urine, or waking from sleep to urinate    · Pain or burning when you urinate    · Pain or pressure in your lower abdomen or back    · Urine that smells bad    · Blood in your urine  Seek care immediately if:   · You are urinating very little or not at all  · You have a high fever with shaking chills  · You have side or back pain that gets worse  Contact your healthcare provider if:   · You have a mild fever  · You do not feel better after 2 days of taking antibiotics  · You are vomiting  · You have new symptoms, such as blood or pus in your urine       · You have questions or concerns about your condition or care  Treatment for a UTI  may include medicines to treat a bacterial infection  You may also need medicines to decrease pain and burning, or decrease the urge to urinate often  Prevent a UTI:   · Empty your bladder often  Urinate and empty your bladder as soon as you feel the need  Do not hold your urine for long periods of time  · Drink liquids as directed  Ask how much liquid to drink each day and which liquids are best for you  You may need to drink more liquids than usual to help flush out the bacteria  Do not drink alcohol, caffeine, or citrus juices  These can irritate your bladder and increase your symptoms  Your healthcare provider may recommend cranberry juice to help prevent a UTI  · Urinate after you have sex  This can help flush out bacteria passed during sex  · Do pelvic muscle exercises often  Pelvic muscle exercises may help you start and stop urinating  Strong pelvic muscles may help you empty your bladder easier  Squeeze these muscles tightly for 5 seconds like you are trying to hold back urine  Then relax for 5 seconds  Gradually work up to squeezing for 10 seconds  Do 3 sets of 15 repetitions a day, or as directed  Follow up with your healthcare provider as directed:  Write down your questions so you remember to ask them during your visits  © 2017 [employer] [facility] Information is for End User's use only and may not be sold, redistributed or otherwise used for commercial purposes  All illustrations and images included in CareNotes® are the copyrighted property of "[employer]  or Chano Acevedo  The above information is an  only  It is not intended as medical advice for individual conditions or treatments  Talk to your doctor, nurse or pharmacist before following any medical regimen to see if it is safe and effective for you  Follow up with PCP in 3-5 days  Proceed to  ER if symptoms worsen      Chief Complaint     Chief Complaint   Patient presents with    Possible UTI     x 1 day         History of Present Illness       Patient presents reporting frequency, urgency, and very mild burning/irritation  He states that if the burning was on a 0 through 10 scale his is half of 1  His symptoms began yesterday  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 working night shift  He does acknowledge high coffee intake and likely not enough water  We talked about his specific gravity being on the more concentrated side as well his urine dip not indicating definitive infection  We also discussed that when he was here February 7th his dip looked very similar and his culture than did not grow any bacteria to indicate infection  He denies any concerns about STI's and declines testing  He states back in February his symptoms seemed to resolve on their own after day or 2  He also mentions that he sees urology yearly and has been for about 4 years now due to a family history of prostate cancer; he states he has an appointment soon (Sept 15)  We discussed giving it a day or 2 this time as well to see if symptoms resolve--using azo to treat symptoms, monitoring for results in the my chart brody that he already has, starting the antibiotic if his symptoms worsen or if he develops any back/flank pain or any other systemic symptoms  Patient is agreeable to this plan  Review of Systems   Review of Systems   Genitourinary: Positive for dysuria, frequency and urgency  All other systems reviewed and are negative          Current Medications       Current Outpatient Medications:     aspirin 325 mg tablet, Take 1 tablet by mouth, Disp: , Rfl:     DUEXIS 800-26 6 MG TABS, Take 1 tablet by mouth 3 (three) times a day, Disp: , Rfl:     ibuprofen (MOTRIN) 800 mg tablet, Take 1 tablet by mouth every 8 (eight) hours, Disp: , Rfl:     lisinopril (ZESTRIL) 10 mg tablet, Take 1 tablet (10 mg total) by mouth daily, Disp: 90 tablet, Rfl: 1    Omega-3 1000 MG CAPS, Take by mouth, Disp: , Rfl:     Red Yeast Rice 600 MG CAPS, Take by mouth, Disp: , Rfl:     ciprofloxacin (CIPRO) 500 mg tablet, Take 1 tablet (500 mg total) by mouth every 12 (twelve) hours for 5 days, Disp: 10 tablet, Rfl: 0    fluticasone (FLONASE) 50 mcg/act nasal spray, 2 sprays into each nostril daily (Patient not taking: Reported on 8/29/2020), Disp: 1 Bottle, Rfl: 0    montelukast (SINGULAIR) 10 mg tablet, Take 1 tablet (10 mg total) by mouth daily at bedtime (Patient not taking: Reported on 8/29/2020), Disp: 30 tablet, Rfl: 3    ofloxacin (OCUFLOX) 0 3 % ophthalmic solution, INSTILL 10 DROPS INTO EACH EAR ONCE DAILY FOR 7 DAYS (Patient not taking: Reported on 8/29/2020), Disp: 5 mL, Rfl: 0    Current Allergies     Allergies as of 08/29/2020 - Reviewed 08/29/2020   Allergen Reaction Noted    Penicillins  05/30/2019    Iodine Other (See Comments) 12/30/2016            The following portions of the patient's history were reviewed and updated as appropriate: allergies, current medications, past family history, past medical history, past social history, past surgical history and problem list      Past Medical History:   Diagnosis Date    BPH with obstruction/lower urinary tract symptoms     Deep vein thrombosis (DVT) (HCC)     Erectile dysfunction due to arterial insufficiency     Hypertension     No known health problems     history of denial of any significant medical , no pertinent past medical history per allscripts    Testicular hypogonadism     Varicocele        Past Surgical History:   Procedure Laterality Date    EYE SURGERY      INGUINAL HERNIA REPAIR      UMBILICAL HERNIA REPAIR      per allscripts    VASECTOMY      VEIN LIGATION AND STRIPPING         Family History   Problem Relation Age of Onset    Heart disease Mother     Heart attack Mother     Breast cancer Mother     Hypertension Mother     Cerebral aneurysm Mother     Prostate cancer Father     Bone cancer Father     Diabetes type II Father     Testicular cancer Cousin     Coronary artery disease Maternal Grandmother     Colon cancer Paternal Grandmother     No Known Problems Son     No Known Problems Son          Medications have been verified  Objective   /73   Pulse 89   Temp 97 5 °F (36 4 °C)   Resp 18   Ht 6' (1 829 m)   Wt 104 kg (230 lb)   SpO2 98%   BMI 31 19 kg/m²        Physical Exam     Physical Exam  Vitals signs and nursing note reviewed  Constitutional:       General: He is not in acute distress  Appearance: He is well-developed  He is not ill-appearing, toxic-appearing or diaphoretic  HENT:      Head: Normocephalic and atraumatic  Pulmonary:      Effort: Pulmonary effort is normal  No respiratory distress  Abdominal:      General: There is no distension  Palpations: Abdomen is soft  Tenderness: There is no abdominal tenderness  Skin:     General: Skin is warm and dry  Capillary Refill: Capillary refill takes less than 2 seconds  Neurological:      Mental Status: He is alert and oriented to person, place, and time  Psychiatric:         Attention and Perception: Attention normal          Mood and Affect: Mood normal          Speech: Speech normal          Behavior: Behavior normal          Thought Content:  Thought content normal          Cognition and Memory: Cognition normal          Judgment: Judgment normal

## 2020-08-30 LAB — BACTERIA UR CULT: NORMAL

## 2020-08-31 DIAGNOSIS — N52.9 ERECTILE DYSFUNCTION, UNSPECIFIED ERECTILE DYSFUNCTION TYPE: ICD-10-CM

## 2020-08-31 DIAGNOSIS — N40.1 BPH WITH URINARY OBSTRUCTION: Primary | ICD-10-CM

## 2020-08-31 DIAGNOSIS — N13.8 BPH WITH URINARY OBSTRUCTION: Primary | ICD-10-CM

## 2020-09-15 ENCOUNTER — TELEMEDICINE (OUTPATIENT)
Dept: UROLOGY | Facility: MEDICAL CENTER | Age: 50
End: 2020-09-15
Payer: COMMERCIAL

## 2020-09-15 VITALS
TEMPERATURE: 97.2 F | HEIGHT: 72 IN | DIASTOLIC BLOOD PRESSURE: 82 MMHG | BODY MASS INDEX: 31.97 KG/M2 | SYSTOLIC BLOOD PRESSURE: 140 MMHG | WEIGHT: 236 LBS

## 2020-09-15 DIAGNOSIS — N52.9 ERECTILE DYSFUNCTION, UNSPECIFIED ERECTILE DYSFUNCTION TYPE: ICD-10-CM

## 2020-09-15 DIAGNOSIS — N40.1 BPH WITH URINARY OBSTRUCTION: ICD-10-CM

## 2020-09-15 DIAGNOSIS — N13.8 BPH WITH URINARY OBSTRUCTION: ICD-10-CM

## 2020-09-15 DIAGNOSIS — N50.89 TESTICULAR MICROLITHIASIS: Primary | ICD-10-CM

## 2020-09-15 PROCEDURE — 3077F SYST BP >= 140 MM HG: CPT | Performed by: UROLOGY

## 2020-09-15 PROCEDURE — 99214 OFFICE O/P EST MOD 30 MIN: CPT | Performed by: UROLOGY

## 2020-09-15 PROCEDURE — 3079F DIAST BP 80-89 MM HG: CPT | Performed by: UROLOGY

## 2020-09-15 NOTE — ASSESSMENT & PLAN NOTE
AUA symptom score is 4 and he is pleased with his voiding pattern  Urinalysis was negative and his PSA is normal at 0 6 (March 2020)  He has had 2 episodes of dysuria and frequency with negative urine cultures  I recommended checking a renal and bladder ultrasound to screen the kidneys and upper tracts for pathology  We will plan to recheck PSA and a urinalysis in March 2021

## 2020-09-15 NOTE — PROGRESS NOTES
Assessment/Plan:    BPH with urinary obstruction  AUA symptom score is 4 and he is pleased with his voiding pattern  Urinalysis was negative and his PSA is normal at 0 6 (March 2020)  He has had 2 episodes of dysuria and frequency with negative urine cultures  I recommended checking a renal and bladder ultrasound to screen the kidneys and upper tracts for pathology  We will plan to recheck PSA and a urinalysis in March 2021  Testicular microlithiasis  Testicular exam is negative for any mass  We will continue follow  Male erectile dysfunction  Erectile dysfunction has improved with exercise and weight loss  We will continue observation  Diagnoses and all orders for this visit:    Testicular microlithiasis    BPH with urinary obstruction  -     Ambulatory referral to Urology  -     PSA Total, Diagnostic; Future  -     Urinalysis with microscopic; Future  -     US kidney and bladder; Future    Erectile dysfunction, unspecified erectile dysfunction type  -     Ambulatory referral to Urology          Subjective:      Patient ID: Lucius Boas is a 44 y o  male  Benign Prostatic Hypertrophy   This is a chronic problem  The current episode started more than 1 year ago  The problem is unchanged  Irritative symptoms do not include frequency, nocturia (He works at night but gets up only once during the day) or urgency  Nocturia x 1  Obstructive symptoms do not include dribbling, incomplete emptying, an intermittent stream, a slower stream, straining or a weak stream  occasional slow stream  Pertinent negatives include no chills, dysuria, genital pain, hematuria, hesitancy, nausea or vomiting  AUA score is 0-7  He is sexually active  Nothing aggravates the symptoms  Past treatments include nothing  Erectile Dysfunction   This is a chronic problem  The current episode started more than 1 year ago  The problem has been gradually improving since onset   The nature of his difficulty is maintaining erection  Irritative symptoms do not include frequency, nocturia (He works at night but gets up only once during the day) or urgency  Nocturia x 1  Obstructive symptoms do not include dribbling, incomplete emptying, an intermittent stream, a slower stream, straining or a weak stream  occasional slow stream  Pertinent negatives include no chills, dysuria, genital pain, hematuria or hesitancy  The symptoms are aggravated by stress  Past treatments include sildenafil  The treatment provided significant relief  He has had abnormal vision and flushing caused by medications  Risk factors include BPH  He is pleased with his voiding pattern  He is exercising and has lost weight  His erectile function is better  He has not required medication  The following portions of the patient's history were reviewed and updated as appropriate: allergies, current medications, past family history, past medical history, past social history, past surgical history and problem list     Review of Systems   Constitutional: Negative  Negative for chills, diaphoresis, fatigue and fever  He's regained his weight   HENT: Negative  Eyes: Negative  Respiratory: Negative  Cardiovascular: Negative  Gastrointestinal: Negative  Negative for nausea and vomiting  Endocrine: Negative  Genitourinary: Negative for dysuria, frequency, hematuria, hesitancy, incomplete emptying, nocturia (He works at night but gets up only once during the day) and urgency  See HPI   Musculoskeletal: Negative  Skin: Negative  Allergic/Immunologic: Negative  Neurological: Negative  Hematological: Negative  Psychiatric/Behavioral: Negative  AUA SYMPTOM SCORE      Most Recent Value   AUA SYMPTOM SCORE   How often have you had a sensation of not emptying your bladder completely after you finished urinating? 1   How often have you had to urinate again less than two hours after you finished urinating?   1   How often have you found you stopped and started again several times when you urinate?  0   How often have you found it difficult to postpone urination? 0   How often have you had a weak urinary stream?  1   How often have you had to push or strain to begin urination? 0   How many times did you most typically get up to urinate from the time you went to bed at night until the time you got up in the morning? 1   Quality of Life: If you were to spend the rest of your life with your urinary condition just the way it is now, how would you feel about that?  1   AUA SYMPTOM SCORE  4           Objective:      /82 (BP Location: Left arm, Patient Position: Sitting, Cuff Size: Adult)   Temp (!) 97 2 °F (36 2 °C)   Ht 6' (1 829 m)   Wt 107 kg (236 lb)   BMI 32 01 kg/m²          Physical Exam  Constitutional:       General: He is not in acute distress  Appearance: Normal appearance  He is well-developed  He is not ill-appearing, toxic-appearing or diaphoretic  HENT:      Head: Normocephalic and atraumatic  Eyes:      Conjunctiva/sclera: Conjunctivae normal    Neck:      Musculoskeletal: Neck supple  Cardiovascular:      Rate and Rhythm: Normal rate  Pulmonary:      Effort: Pulmonary effort is normal    Abdominal:      General: Bowel sounds are normal  There is no distension  Palpations: Abdomen is soft  There is no mass  Tenderness: There is no abdominal tenderness  There is no right CVA tenderness, left CVA tenderness, guarding or rebound  Hernia: No hernia is present  Genitourinary:     Penis: Normal  No phimosis or hypospadias  Scrotum/Testes:         Right: Mass not present  Left: Mass not present  Rectum: Normal       Comments: Both testes are small and they  were retractile in nature, but are able to be brought down into the scrotum  Prostate 1+ enlarged and palpably benign  Musculoskeletal: Normal range of motion  Skin:     General: Skin is warm and dry     Neurological: Mental Status: He is alert and oriented to person, place, and time  Psychiatric:         Behavior: Behavior normal          Thought Content:  Thought content normal          Judgment: Judgment normal

## 2020-09-16 ENCOUNTER — OFFICE VISIT (OUTPATIENT)
Dept: FAMILY MEDICINE CLINIC | Facility: CLINIC | Age: 50
End: 2020-09-16
Payer: COMMERCIAL

## 2020-09-16 VITALS
DIASTOLIC BLOOD PRESSURE: 80 MMHG | WEIGHT: 238 LBS | BODY MASS INDEX: 32.23 KG/M2 | SYSTOLIC BLOOD PRESSURE: 118 MMHG | HEIGHT: 72 IN | HEART RATE: 94 BPM | TEMPERATURE: 96.6 F | OXYGEN SATURATION: 99 %

## 2020-09-16 DIAGNOSIS — I10 BENIGN ESSENTIAL HYPERTENSION: Primary | ICD-10-CM

## 2020-09-16 DIAGNOSIS — E78.00 HYPERCHOLESTEROLEMIA: ICD-10-CM

## 2020-09-16 DIAGNOSIS — Z11.4 SCREENING FOR HIV WITHOUT PRESENCE OF RISK FACTORS: ICD-10-CM

## 2020-09-16 DIAGNOSIS — Z12.5 SCREENING FOR PROSTATE CANCER: ICD-10-CM

## 2020-09-16 PROCEDURE — 99214 OFFICE O/P EST MOD 30 MIN: CPT | Performed by: FAMILY MEDICINE

## 2020-09-16 PROCEDURE — 3725F SCREEN DEPRESSION PERFORMED: CPT | Performed by: FAMILY MEDICINE

## 2020-09-16 PROCEDURE — 1036F TOBACCO NON-USER: CPT | Performed by: FAMILY MEDICINE

## 2020-09-16 NOTE — PROGRESS NOTES
Assessment/Plan:    No problem-specific Assessment & Plan notes found for this encounter  Diagnoses and all orders for this visit:    Benign essential hypertension  -     CBC and differential; Future  -     TSH, 3rd generation with Free T4 reflex; Future    Screening for HIV without presence of risk factors  -     HIV 1/2 Antigen/Antibody (4th Generation) w Reflex [facility]; Future    Screening for prostate cancer  -     PSA, Total Screen; Future    Hypercholesterolemia  -     Comprehensive metabolic panel; Future  -     Lipid panel; Future          PHQ-9 Depression Screening    PHQ-9:    Frequency of the following problems over the past two weeks:       Little interest or pleasure in doing things:  0 - not at all  Feeling down, depressed, or hopeless:  0 - not at all  PHQ-2 Score:  0            Subjective:      Patient ID: Chantell Dueñas is a 44 y o  male  Pt complains of allergy symptoms    Hypertension   This is a chronic problem  The current episode started more than 1 year ago  The problem is unchanged  The problem is controlled  Pertinent negatives include no chest pain, headaches or palpitations  There are no associated agents to hypertension  Risk factors for coronary artery disease include male gender, obesity and sedentary lifestyle  Past treatments include ACE inhibitors  The current treatment provides moderate improvement  Compliance problems include exercise and diet  The following portions of the patient's history were reviewed and updated as appropriate: allergies, current medications, past family history, past medical history, past social history, past surgical history and problem list     Review of Systems   Eyes: Negative for visual disturbance  Cardiovascular: Negative for chest pain and palpitations  Neurological: Negative for headaches           Objective:    /80   Pulse 94   Temp (!) 96 6 °F (35 9 °C)   Ht 6' (1 829 m)   Wt 108 kg (238 lb)   SpO2 99%   BMI 32 28 kg/m²      Physical Exam  Vitals signs and nursing note reviewed  Constitutional:       General: He is not in acute distress  Appearance: Normal appearance  He is well-developed  He is not ill-appearing, toxic-appearing or diaphoretic  HENT:      Head: Normocephalic and atraumatic  Eyes:      General: No scleral icterus  Conjunctiva/sclera: Conjunctivae normal       Pupils: Pupils are equal, round, and reactive to light  Neck:      Musculoskeletal: Normal range of motion and neck supple  Cardiovascular:      Rate and Rhythm: Normal rate and regular rhythm  Heart sounds: Normal heart sounds  No murmur  Pulmonary:      Effort: Pulmonary effort is normal  No respiratory distress  Breath sounds: Normal breath sounds  No wheezing or rales  Abdominal:      General: Bowel sounds are normal  There is no distension  Palpations: Abdomen is soft  There is no mass  Tenderness: There is no abdominal tenderness  There is no guarding or rebound  Musculoskeletal:      Right lower leg: No edema  Left lower leg: No edema  Lymphadenopathy:      Cervical: No cervical adenopathy  Skin:     General: Skin is warm and dry  Neurological:      Mental Status: He is alert and oriented to person, place, and time  Psychiatric:         Behavior: Behavior normal          Thought Content:  Thought content normal          Judgment: Judgment normal

## 2020-09-17 ENCOUNTER — TELEPHONE (OUTPATIENT)
Dept: FAMILY MEDICINE CLINIC | Facility: CLINIC | Age: 50
End: 2020-09-17

## 2020-09-17 NOTE — TELEPHONE ENCOUNTER
Left msg  For the name of the podiatrist on [address] in [place] [address] due to I need to call them to get NPI number to due ins  Ref   On availity

## 2021-01-05 DIAGNOSIS — R50.9 FEVER, UNSPECIFIED FEVER CAUSE: ICD-10-CM

## 2021-01-05 DIAGNOSIS — R50.9 FEVER, UNSPECIFIED FEVER CAUSE: Primary | ICD-10-CM

## 2021-01-05 PROCEDURE — 87637 SARSCOV2&INF A&B&RSV AMP PRB: CPT | Performed by: FAMILY MEDICINE

## 2021-01-07 LAB
FLUAV RNA NPH QL NAA+PROBE: NOT DETECTED
FLUBV RNA NPH QL NAA+PROBE: NOT DETECTED
RSV RNA NPH QL NAA+PROBE: NOT DETECTED
SARS-COV-2 RNA NPH QL NAA+PROBE: DETECTED

## 2021-01-12 ENCOUNTER — HOSPITAL ENCOUNTER (EMERGENCY)
Facility: HOSPITAL | Age: 51
Discharge: HOME | End: 2021-01-12
Attending: EMERGENCY MEDICINE
Payer: COMMERCIAL

## 2021-01-12 VITALS
SYSTOLIC BLOOD PRESSURE: 146 MMHG | WEIGHT: 230 LBS | OXYGEN SATURATION: 98 % | RESPIRATION RATE: 16 BRPM | HEIGHT: 72 IN | TEMPERATURE: 97.3 F | BODY MASS INDEX: 31.15 KG/M2 | DIASTOLIC BLOOD PRESSURE: 91 MMHG | HEART RATE: 90 BPM

## 2021-01-12 DIAGNOSIS — M79.604 RIGHT LEG PAIN: Primary | ICD-10-CM

## 2021-01-12 PROCEDURE — 96372 THER/PROPH/DIAG INJ SC/IM: CPT

## 2021-01-12 PROCEDURE — 99283 EMERGENCY DEPT VISIT LOW MDM: CPT

## 2021-01-12 PROCEDURE — 99284 EMERGENCY DEPT VISIT MOD MDM: CPT | Performed by: EMERGENCY MEDICINE

## 2021-01-12 RX ADMIN — ENOXAPARIN SODIUM 160 MG: 100 INJECTION SUBCUTANEOUS at 22:05

## 2021-01-13 ENCOUNTER — APPOINTMENT (EMERGENCY)
Dept: NON INVASIVE DIAGNOSTICS | Facility: HOSPITAL | Age: 51
End: 2021-01-13
Payer: COMMERCIAL

## 2021-01-13 ENCOUNTER — HOSPITAL ENCOUNTER (EMERGENCY)
Facility: HOSPITAL | Age: 51
Discharge: HOME | End: 2021-01-13
Attending: EMERGENCY MEDICINE | Admitting: EMERGENCY MEDICINE
Payer: COMMERCIAL

## 2021-01-13 VITALS
HEIGHT: 72 IN | TEMPERATURE: 97.5 F | WEIGHT: 230 LBS | DIASTOLIC BLOOD PRESSURE: 96 MMHG | BODY MASS INDEX: 31.15 KG/M2 | SYSTOLIC BLOOD PRESSURE: 150 MMHG | OXYGEN SATURATION: 97 % | RESPIRATION RATE: 20 BRPM | HEART RATE: 94 BPM

## 2021-01-13 DIAGNOSIS — I82.90 VENOUS THROMBOSIS: Primary | ICD-10-CM

## 2021-01-13 PROCEDURE — 93971 EXTREMITY STUDY: CPT

## 2021-01-13 PROCEDURE — 93971 EXTREMITY STUDY: CPT | Performed by: SURGERY

## 2021-01-13 PROCEDURE — 99283 EMERGENCY DEPT VISIT LOW MDM: CPT

## 2021-01-13 PROCEDURE — 99284 EMERGENCY DEPT VISIT MOD MDM: CPT | Performed by: EMERGENCY MEDICINE

## 2021-01-13 NOTE — DISCHARGE INSTRUCTIONS
You were seen in the ER for leg swelling  You have a blood clot in your right gastroc muscle  As we discussed, Raviport may increase your risk for clots, and I believe anticoagulation is indicated given your history of prior clotting and COVID infection  We will start you on eliquis (apixaban) blood thinner  Read the medication pamphlet carefully  I recommend holding your aspirin until further discussion with hematology and/or your primary physician  Schedule follow up for your DVT with your PCP and Hematology for further management

## 2021-01-13 NOTE — ED PROVIDER NOTES
History  Chief Complaint   Patient presents with    Leg Pain     revisit     Patient is a pleasant 64-year-old male with past medical history DVT on aspirin, presenting with right lower extremity swelling over the last 2 days  Did test positive for COVID in 1/5  Denies chest pain or shortness of breath  Leg Pain  Location:  Leg  Injury: no    Leg location:  R leg  Pain details:     Quality:  Dull    Radiates to:  Does not radiate    Severity:  Mild    Onset quality:  Gradual    Duration:  2 days    Timing:  Constant  Chronicity:  New  Dislocation: no    Associated symptoms: no back pain and no fever        Prior to Admission Medications   Prescriptions Last Dose Informant Patient Reported? Taking?    Omega-3 1000 MG CAPS  Self Yes No   Sig: Take by mouth   aspirin 325 mg tablet  Self Yes No   Sig: Take 1 tablet by mouth   lisinopril (ZESTRIL) 10 mg tablet   No No   Sig: Take 1 tablet (10 mg total) by mouth daily      Facility-Administered Medications: None       Past Medical History:   Diagnosis Date    BPH with obstruction/lower urinary tract symptoms     [facility]DVT) ([facility] [address] )     Erectile dysfunction due to arterial insufficiency     Hypertension     No known health problems     history of denial of any significant medical , no pertinent past medical history per allscripts    Testicular hypogonadism     Varicocele        Past Surgical History:   Procedure Laterality Date    EYE SURGERY      INGUINAL HERNIA REPAIR      UMBILICAL HERNIA REPAIR      per allscripts    VASECTOMY      VEIN LIGATION AND STRIPPING         Family History   Problem Relation Age of Onset    Heart disease Mother     Heart attack Mother     Breast cancer Mother     Hypertension Mother     Cerebral aneurysm Mother     Prostate cancer Father     Bone cancer Father     Diabetes type II Father     Testicular cancer Cousin     Coronary artery disease Maternal Grandmother     Colon cancer Paternal Grandmother  No Known Problems Son     No Known Problems Son      I have reviewed and agree with the history as documented  E-Cigarette/Vaping    E-Cigarette Use Never User      E-Cigarette/Vaping Substances     Social History     Tobacco Use    Smoking status: Never Smoker    Smokeless tobacco: Never Used   Substance Use Topics    Alcohol use: Yes     Frequency: Monthly or less     Comment: rare, social, never drank alcohol per allscripts    Drug use: No       Review of Systems   Constitutional: Negative for chills and fever  HENT: Negative for congestion, nosebleeds, rhinorrhea and sore throat  Eyes: Negative for pain and visual disturbance  Respiratory: Negative for cough and wheezing  Cardiovascular: Positive for leg swelling  Negative for chest pain  Gastrointestinal: Negative for abdominal distention, abdominal pain, diarrhea, nausea and vomiting  Genitourinary: Negative for dysuria and frequency  Musculoskeletal: Negative for back pain and joint swelling  Skin: Negative for rash and wound  Neurological: Negative for weakness and numbness  Psychiatric/Behavioral: Negative for decreased concentration and suicidal ideas  Physical Exam  Physical Exam  Vitals signs and nursing note reviewed  Constitutional:       Appearance: He is well-developed  HENT:      Head: Normocephalic and atraumatic  Eyes:      Conjunctiva/sclera: Conjunctivae normal       Pupils: Pupils are equal, round, and reactive to light  Neck:      Musculoskeletal: Normal range of motion and neck supple  Trachea: No tracheal deviation  Cardiovascular:      Rate and Rhythm: Normal rate and regular rhythm  Heart sounds: Normal heart sounds  No murmur  Pulmonary:      Effort: Pulmonary effort is normal  No respiratory distress  Breath sounds: Normal breath sounds  No wheezing or rales  Abdominal:      General: Bowel sounds are normal  There is no distension  Palpations: Abdomen is soft  Tenderness: There is no abdominal tenderness  Musculoskeletal:         General: Swelling present  No deformity  Skin:     General: Skin is warm and dry  Capillary Refill: Capillary refill takes less than 2 seconds  Neurological:      General: No focal deficit present  Mental Status: He is alert and oriented to person, place, and time  Sensory: No sensory deficit  Psychiatric:         Judgment: Judgment normal          Vital Signs  ED Triage Vitals [01/13/21 0811]   Temperature Pulse Respirations Blood Pressure SpO2   97 5 °F (36 4 °C) 94 20 150/96 97 %      Temp Source Heart Rate Source Patient Position - Orthostatic VS BP Location FiO2 (%)   Temporal Monitor -- Left arm --      Pain Score       --           Vitals:    01/13/21 0811   BP: 150/96   Pulse: 94         Visual Acuity      ED Medications  Medications - No data to display    Diagnostic Studies  Results Reviewed     None                 VAS lower limb venous duplex study, unilateral/limited    (Results Pending)              Procedures  Procedures         ED Course  ED Course as of Jan 13 1244   Wed Jan 13, 2021   0919 Gastroc clot, will start on eliquis                                              MDM  Number of Diagnoses or Management Options  Venous thrombosis: new and requires workup  Diagnosis management comments: Patient presents with right lower extremity edema, history of DVT no longer on anticoagulation  Ultrasound pes blood clot in the gastrocnemius muscle  Current literature would suggest that NSAIDs and warm compresses are appropriate, however in a patient prior history of DVT anticoagulation should be considered  In addition in the setting of known positive COVID which appears to be increased risks of venous thromboembolism    I believe anticoagulation benefits and prevention of life-threatening pulmonary embolism outweigh the risk of bleeding as he has tolerated Coumadin in the past   Will start on Eliquis starter pack and outpatient and hematology follow-up  Patient is comfortable with this plan       Amount and/or Complexity of Data Reviewed  Review and summarize past medical records: yes  Independent visualization of images, tracings, or specimens: yes    Risk of Complications, Morbidity, and/or Mortality  Presenting problems: high  Diagnostic procedures: minimal  Management options: high        Disposition  Final diagnoses:   Venous thrombosis     Time reflects when diagnosis was documented in both MDM as applicable and the Disposition within this note     Time User Action Codes Description Comment    1/13/2021  9:21 AM Edson Carito Add [I82 [id number]] Venous thrombosis       ED Disposition     ED Disposition Condition Date/Time Comment    Discharge Stable Wed Jan 13, 2021  9:21 AM [address] Garnica discharge to home/self care              Follow-up Information     Follow up With Specialties Details Why [address] Garnica, DO Family Medicine Call   [facility] [address] [facility]  [phone]      Yoni Aguilar MD Hematology and Oncology, Hematology, Oncology Schedule an appointment as soon as possible for a visit   [address] [place], [place] [address] [address]  [address] [address] [address]  [phone]            Discharge Medication List as of 1/13/2021  9:27 AM      START taking these medications    Details   apixaban (ELIQUIS) 5 mg Multiple Dosages:Starting Wed 1/13/2021, Last dose on Tue 1/19/2021, THEN Starting Wed 1/20/2021, Last dose on Thu 2/11/2021Take 2 tablets (10 mg total) by mouth 2 (two) times a day for 7 days, THEN 1 tablet (5 mg total) 2 (two) times a day for 23 day s , Normal         CONTINUE these medications which have NOT CHANGED    Details   lisinopril (ZESTRIL) 10 mg tablet Take 1 tablet (10 mg total) by mouth daily, Starting Tue 5/19/2020, Normal      Omega-3 1000 MG CAPS Take by mouth, Historical Med         STOP taking these medications       aspirin 325 mg tablet Comments:   Reason for Stopping: No discharge procedures on file      PDMP Review     None          ED Provider  Electronically Signed by           Niraj Beth DO  01/13/21 5534

## 2021-01-13 NOTE — ED PROVIDER NOTES
History  Chief Complaint   Patient presents with    Leg Pain     + COVID, states that he has been sitting around more than normal and today he has claf pain, denies redness or warmth  Pt with PMH DVT s/p supervifical venous stripping procedure and he tells me had negative workup for VTE predisposition afterwards and stopped Warfarin years ago  Had Coivd since Jan 1  Now one day of pain to right calf  No swelling  No cp/sob/palps/dizziness  No trauma  Prior to Admission Medications   Prescriptions Last Dose Informant Patient Reported? Taking? Omega-3 1000 MG CAPS  Self Yes No   Sig: Take by mouth   lisinopril (ZESTRIL) 10 mg tablet   No No   Sig: Take 1 tablet (10 mg total) by mouth daily      Facility-Administered Medications: None       Past Medical History:   Diagnosis Date    BPH with obstruction/lower urinary tract symptoms     [facility] vein thrombosis [facility]DVT) ([facility] [address]     Erectile dysfunction due to arterial insufficiency     Hypertension     No known health problems     history of denial of any significant medical , no pertinent past medical history per allscripts    Testicular hypogonadism     Varicocele        Past Surgical History:   Procedure Laterality Date    EYE SURGERY      INGUINAL HERNIA REPAIR      UMBILICAL HERNIA REPAIR      per allscripts    VASECTOMY      VEIN LIGATION AND STRIPPING         Family History   Problem Relation Age of Onset    Heart disease Mother     Heart attack Mother     Breast cancer Mother     Hypertension Mother     Cerebral aneurysm Mother     Prostate cancer Father     Bone cancer Father     Diabetes type II Father     Testicular cancer Cousin     Coronary artery disease Maternal Grandmother     Colon cancer Paternal Grandmother     No Known Problems Son     No Known Problems Son      I have reviewed and agree with the history as documented      E-Cigarette/Vaping    E-Cigarette Use Never User      E-Cigarette/Vaping Substances     Social History     Tobacco Use    Smoking status: Never Smoker    Smokeless tobacco: Never Used   Substance Use Topics    Alcohol use: Yes     Frequency: Monthly or less     Comment: rare, social, never drank alcohol per allscripts    Drug use: No       Review of Systems   Constitutional: Negative for fever  HENT: Negative for rhinorrhea  Eyes: Negative for visual disturbance  Respiratory: Negative for shortness of breath  Cardiovascular: Negative for chest pain  Gastrointestinal: Negative for abdominal pain, diarrhea and vomiting  Endocrine: Negative for polydipsia  Genitourinary: Negative for dysuria, frequency and hematuria  Musculoskeletal: Negative for arthralgias, back pain, joint swelling, neck pain and neck stiffness  Skin: Negative for rash  Allergic/Immunologic: Negative for immunocompromised state  Neurological: Negative for speech difficulty, weakness and numbness  Psychiatric/Behavioral: Negative for suicidal ideas  Physical Exam  Physical Exam  Constitutional:       General: He is not in acute distress  HENT:      Head: Normocephalic and atraumatic  Eyes:      Conjunctiva/sclera: Conjunctivae normal    Neck:      Musculoskeletal: Normal range of motion and neck supple  Cardiovascular:      Rate and Rhythm: Regular rhythm  Heart sounds: Normal heart sounds  Pulmonary:      Effort: Pulmonary effort is normal       Breath sounds: Normal breath sounds  Abdominal:      General: Bowel sounds are normal       Palpations: Abdomen is soft  There is no mass  Tenderness: There is no abdominal tenderness  There is no guarding  Musculoskeletal:         General: Tenderness present  Comments: Calves symmetric, no pitting   +deep tenderness right calf  Distal pulses, perfusion, motor/sensory intact   Skin:     General: Skin is warm and dry  Neurological:      Mental Status: He is alert and oriented to person, place, and time     Psychiatric:         Mood and Affect: Mood normal          Vital Signs  ED Triage Vitals   Temperature Pulse Respirations Blood Pressure SpO2   01/12/21 2107 01/12/21 2107 01/12/21 2107 01/12/21 2108 01/12/21 2107   (!) 97 3 °F (36 3 °C) 90 16 146/91 98 %      Temp Source Heart Rate Source Patient Position - Orthostatic VS BP Location FiO2 (%)   01/12/21 2107 01/12/21 2107 01/12/21 2108 01/12/21 2108 --   Temporal Monitor Sitting Left arm       Pain Score       01/12/21 2107       2           Vitals:    01/12/21 2107 01/12/21 2108   BP:  146/91   Pulse: 90    Patient Position - Orthostatic VS:  Sitting         Visual Acuity      ED Medications  Medications   enoxaparin (LOVENOX) subcutaneous injection 160 mg (160 mg Subcutaneous Given 1/12/21 2205)       Diagnostic Studies  Results Reviewed     None                 No orders to display              Procedures  Procedures         ED Course                                           MDM  Number of Diagnoses or Management Options  Right leg pain:   Diagnosis management comments: No son after 7pm  Will give 1 5 mg/kg lovenox and have him come back in am for sono      Disposition  Final diagnoses:   Right leg pain     Time reflects when diagnosis was documented in both MDM as applicable and the Disposition within this note     Time User Action Codes Description Comment    1/12/2021  9:42 PM Evelyn Morales Add [N54 483] Right leg pain       ED Disposition     ED Disposition Condition Date/Time Comment    Discharge Stable Tue Jan 12, 2021  9:40 PM [address] Danika discharge to home/self care              Follow-up Information     Follow up With Specialties Details Why Contact Info Additional Information    Christiana Monge  Emergency Department Emergency Medicine In 1 day  [address] [place] [id number]  [facility]  Emergency Department          Discharge Medication List as of 1/12/2021  9:42 PM      CONTINUE these medications which have NOT CHANGED    Details   lisinopril (ZESTRIL) 10 mg tablet Take 1 tablet (10 mg total) by mouth daily, Starting Tue 5/19/2020, Normal      Omega-3 1000 MG CAPS Take by mouth, Historical Med      aspirin 325 mg tablet Take 1 tablet by mouth, Historical Med           No discharge procedures on file      PDMP Review     None          ED Provider  Electronically Signed by           Deloris Patel MD  01/13/21 8743

## 2021-01-15 ENCOUNTER — TELEPHONE (OUTPATIENT)
Dept: HEMATOLOGY ONCOLOGY | Facility: CLINIC | Age: 51
End: 2021-01-15

## 2021-01-15 NOTE — TELEPHONE ENCOUNTER
New Patient Encounter    New Patient Intake Form   Patient Details:  Jaquelin Camejo  1970  [id number]    Background Information:  [address] starts by opening a telephone encounter and gathering the following information   Who is calling to schedule? If not self, relationship to patient? self   Referring Provider [facility]   What is the diagnosis? Venous thrombosis   Is this diagnosis confirmed? Yes   When was the diagnosis? 1/2021   Is there a confirmed diagnosis from a biopsy/tissue reviewed by pathology? Were outside slides requested? NA   Is patient aware of diagnosis? Yes   Is there a personal history and what kind? Is there a family history and what kind? Reason for visit? New Diagnosis   Have you had any imaging or labs done? If so: when, where? yes  [facility] 9/2020   Are records in EPIC? yes   If patient has a prior history of breast cancer were old records obtained? NA   Was the patient told to bring a disk? no   Does the patient smoke or Vape? no   If yes, how many packs or cartridges per day? Scheduling Information:   Preferred [place]:  Tong marcano     Are there any dates/time the patient cannot be seen? Miscellaneous:    After completing the above information, please route to Financial Counselor and the appropriate Nurse Navigator for review

## 2021-01-16 ENCOUNTER — HOSPITAL ENCOUNTER (EMERGENCY)
Facility: HOSPITAL | Age: 51
Discharge: HOME | End: 2021-01-16
Attending: FAMILY MEDICINE
Payer: COMMERCIAL

## 2021-01-16 ENCOUNTER — APPOINTMENT (EMERGENCY)
Dept: CT IMAGING | Facility: HOSPITAL | Age: 51
End: 2021-01-16
Payer: COMMERCIAL

## 2021-01-16 VITALS
TEMPERATURE: 98.2 F | RESPIRATION RATE: 18 BRPM | HEIGHT: 73 IN | SYSTOLIC BLOOD PRESSURE: 170 MMHG | OXYGEN SATURATION: 96 % | HEART RATE: 92 BPM | BODY MASS INDEX: 30.75 KG/M2 | WEIGHT: 232 LBS | DIASTOLIC BLOOD PRESSURE: 90 MMHG

## 2021-01-16 DIAGNOSIS — R10.9 ABDOMINAL PAIN: ICD-10-CM

## 2021-01-16 DIAGNOSIS — U07.1 LAB TEST POSITIVE FOR DETECTION OF COVID-19 VIRUS: ICD-10-CM

## 2021-01-16 DIAGNOSIS — R79.89 ELEVATED LFTS: ICD-10-CM

## 2021-01-16 DIAGNOSIS — K65.4 MESENTERIC PANNICULITIS (HCC): Primary | ICD-10-CM

## 2021-01-16 LAB
ALBUMIN SERPL BCP-MCNC: 4.4 G/DL (ref 3.5–5.7)
ALP SERPL-CCNC: 56 U/L (ref 40–150)
ALT SERPL W P-5'-P-CCNC: 180 U/L (ref 7–52)
ANION GAP SERPL CALCULATED.3IONS-SCNC: 9 MMOL/L (ref 4–13)
AST SERPL W P-5'-P-CCNC: 112 U/L (ref 13–39)
BASOPHILS # BLD AUTO: 0.1 THOUSANDS/ΜL (ref 0–0.1)
BASOPHILS NFR BLD AUTO: 1 % (ref 0–2)
BILIRUB SERPL-MCNC: 0.7 MG/DL (ref 0.2–1)
BUN SERPL-MCNC: 22 MG/DL (ref 7–25)
CALCIUM SERPL-MCNC: 9.6 MG/DL (ref 8.6–10.5)
CHLORIDE SERPL-SCNC: 102 MMOL/L (ref 98–107)
CO2 SERPL-SCNC: 25 MMOL/L (ref 21–31)
CREAT SERPL-MCNC: 1.1 MG/DL (ref 0.7–1.3)
EOSINOPHIL # BLD AUTO: 0.1 THOUSAND/ΜL (ref 0–0.61)
EOSINOPHIL NFR BLD AUTO: 1 % (ref 0–5)
ERYTHROCYTE [DISTWIDTH] IN BLOOD BY AUTOMATED COUNT: 12.8 % (ref 11.5–14.5)
GFR SERPL CREATININE-BSD FRML MDRD: 77 ML/MIN/1.73SQ M
GLUCOSE SERPL-MCNC: 103 MG/DL (ref 65–99)
HCT VFR BLD AUTO: 46.8 % (ref 42–47)
HGB BLD-MCNC: 16 G/DL (ref 14–18)
INR PPP: 0.97 (ref 0.84–1.19)
LG PLATELETS BLD QL SMEAR: PRESENT
LIPASE SERPL-CCNC: <10 U/L (ref 11–82)
LYMPHOCYTES # BLD AUTO: 1.7 THOUSANDS/ΜL (ref 0.6–4.47)
LYMPHOCYTES NFR BLD AUTO: 20 % (ref 21–51)
MCH RBC QN AUTO: 31.7 PG (ref 26–34)
MCHC RBC AUTO-ENTMCNC: 34.3 G/DL (ref 31–37)
MCV RBC AUTO: 92 FL (ref 81–99)
MONOCYTES # BLD AUTO: 0.9 THOUSAND/ΜL (ref 0.17–1.22)
MONOCYTES NFR BLD AUTO: 11 % (ref 2–12)
NEUTROPHILS # BLD AUTO: 5.8 THOUSANDS/ΜL (ref 1.4–6.5)
NEUTS SEG NFR BLD AUTO: 68 % (ref 42–75)
PLATELET # BLD AUTO: 220 THOUSANDS/UL (ref 149–390)
PLATELET BLD QL SMEAR: ADEQUATE
PLATELET CLUMP BLD QL SMEAR: NORMAL
PMV BLD AUTO: 7.7 FL (ref 8.6–11.7)
POTASSIUM SERPL-SCNC: 4.5 MMOL/L (ref 3.5–5.5)
PROT SERPL-MCNC: 7.5 G/DL (ref 6.4–8.9)
PROTHROMBIN TIME: 12.8 SECONDS (ref 11.6–14.5)
RBC # BLD AUTO: 5.07 MILLION/UL (ref 4.3–5.9)
RBC MORPH BLD: NORMAL
SODIUM SERPL-SCNC: 136 MMOL/L (ref 134–143)
WBC # BLD AUTO: 8.6 THOUSAND/UL (ref 4.8–10.8)

## 2021-01-16 PROCEDURE — 85610 PROTHROMBIN TIME: CPT | Performed by: FAMILY MEDICINE

## 2021-01-16 PROCEDURE — 85025 COMPLETE CBC W/AUTO DIFF WBC: CPT | Performed by: FAMILY MEDICINE

## 2021-01-16 PROCEDURE — 36415 COLL VENOUS BLD VENIPUNCTURE: CPT | Performed by: FAMILY MEDICINE

## 2021-01-16 PROCEDURE — G1004 CDSM NDSC: HCPCS

## 2021-01-16 PROCEDURE — 80053 COMPREHEN METABOLIC PANEL: CPT | Performed by: FAMILY MEDICINE

## 2021-01-16 PROCEDURE — 83690 ASSAY OF LIPASE: CPT | Performed by: FAMILY MEDICINE

## 2021-01-16 PROCEDURE — 99284 EMERGENCY DEPT VISIT MOD MDM: CPT

## 2021-01-16 PROCEDURE — 74176 CT ABD & PELVIS W/O CONTRAST: CPT

## 2021-01-16 PROCEDURE — 99285 EMERGENCY DEPT VISIT HI MDM: CPT | Performed by: FAMILY MEDICINE

## 2021-01-16 RX ORDER — METHYLPREDNISOLONE 4 MG/1
TABLET ORAL
Qty: 21 TABLET | Refills: 0 | Status: SHIPPED | OUTPATIENT
Start: 2021-01-16 | End: 2021-02-17

## 2021-01-16 NOTE — ED PROVIDER NOTES
History  Chief Complaint   Patient presents with    Abdominal Pain     Rt sided , on and off since before Donald holiday ,        History provided by:  Patient   used: No    Abdominal Pain  Pain location:  Epigastric  Pain quality: dull    Pain radiates to:  Does not radiate  Pain severity:  Mild  Onset quality:  Gradual  Duration:  4 weeks  Timing:  Intermittent  Progression:  Waxing and waning  Chronicity:  Chronic  Context: not alcohol use, not awakening from sleep, not diet changes, not eating, not medication withdrawal, not previous surgeries, not recent illness, not recent sexual activity, not recent travel, not sick contacts, not suspicious food intake and not trauma    Relieved by:  None tried  Worsened by:  Nothing  Ineffective treatments:  None tried  Associated symptoms: no anorexia, no belching, no chest pain, no chills, no constipation, no cough, no diarrhea, no fatigue, no fever, no nausea, no shortness of breath, no sore throat and no vomiting    Risk factors: no alcohol abuse, has not had multiple surgeries, no NSAID use and no recent hospitalization      This is a 23-year-old male who recently diagnosed with the DVT in the gastrocnemius and was started on Eliquis but didn't take eliquis came with ed with c/o abdominal pain x3 weeks  Pt is covid positive but has been doing well  Pt c/o abdominal pain at the epigastric region and RUQ  Denies any nausea and vomiting  Denies any fever or chills  Tolerating po intake  Pt denies any headache or blurry vision  Sitting comfortably in bed answering question  Pt states nothing makes the pain better or worse  Tolerating po intake  Prior to Admission Medications   Prescriptions Last Dose Informant Patient Reported? Taking?    Omega-3 1000 MG CAPS  Self Yes No   Sig: Take by mouth   apixaban (ELIQUIS) 5 mg Not Taking at Unknown time  No No   Sig: Take 2 tablets (10 mg total) by mouth 2 (two) times a day for 7 days, THEN 1 tablet (5 mg total) 2 (two) times a day for 23 days  Patient not taking: Reported on 1/16/2021   lisinopril (ZESTRIL) 10 mg tablet   No No   Sig: Take 1 tablet (10 mg total) by mouth daily      Facility-Administered Medications: None       Past Medical History:   Diagnosis Date    BPH with obstruction/lower urinary tract symptoms     [facility]T) ([facility] [address]     Erectile dysfunction due to arterial insufficiency     Hypertension     No known health problems     history of denial of any significant medical , no pertinent past medical history per allscripts    Testicular hypogonadism     Varicocele        Past Surgical History:   Procedure Laterality Date    EYE SURGERY      INGUINAL HERNIA REPAIR      UMBILICAL HERNIA REPAIR      per allscripts    VASECTOMY      VEIN LIGATION AND STRIPPING         Family History   Problem Relation Age of Onset    Heart disease Mother     Heart attack Mother     Breast cancer Mother     Hypertension Mother     Cerebral aneurysm Mother     Prostate cancer Father     Bone cancer Father     Diabetes type II Father     Testicular cancer Cousin     Coronary artery disease Maternal Grandmother     Colon cancer Paternal Grandmother     No Known Problems Son     No Known Problems Son      I have reviewed and agree with the history as documented  E-Cigarette/Vaping    E-Cigarette Use Never User      E-Cigarette/Vaping Substances     Social History     Tobacco Use    Smoking status: Never Smoker    Smokeless tobacco: Never Used   Substance Use Topics    Alcohol use: Yes     Frequency: Monthly or less     Comment: rare, social, never drank alcohol per allscripts    Drug use: No       Review of Systems   Constitutional: Negative for chills, fatigue and fever  HENT: Negative  Negative for sore throat  Eyes: Negative  Respiratory: Negative  Negative for cough and shortness of breath  Cardiovascular: Negative  Negative for chest pain     Gastrointestinal: Positive for abdominal pain  Negative for anorexia, blood in stool, constipation, diarrhea, nausea and vomiting  Endocrine: Negative  Genitourinary: Negative  Musculoskeletal: Negative  Skin: Negative  Allergic/Immunologic: Negative  Neurological: Negative  Psychiatric/Behavioral: Negative  Physical Exam  Physical Exam  Vitals signs and nursing note reviewed  Constitutional:       General: He is not in acute distress  Appearance: He is well-developed  He is not diaphoretic  HENT:      Head: Normocephalic and atraumatic  Right Ear: External ear normal       Left Ear: External ear normal       Nose: Nose normal    Eyes:      Conjunctiva/sclera: Conjunctivae normal       Pupils: Pupils are equal, round, and reactive to light  Neck:      Musculoskeletal: Normal range of motion and neck supple  Cardiovascular:      Rate and Rhythm: Normal rate and regular rhythm  Pulmonary:      Effort: Pulmonary effort is normal  No respiratory distress  Breath sounds: Normal breath sounds  No wheezing  Abdominal:      General: Bowel sounds are normal  There is no distension  Palpations: Abdomen is soft  Tenderness: There is generalized abdominal tenderness  Lymphadenopathy:      Cervical: No cervical adenopathy  Skin:     General: Skin is warm and dry  Capillary Refill: Capillary refill takes less than 2 seconds  Neurological:      Mental Status: He is alert and oriented to person, place, and time     Psychiatric:         Behavior: Behavior normal          Vital Signs  ED Triage Vitals [01/16/21 0715]   Temperature Pulse Respirations Blood Pressure SpO2   98 2 °F (36 8 °C) (!) 110 18 153/92 98 %      Temp Source Heart Rate Source Patient Position - Orthostatic VS BP Location FiO2 (%)   Temporal Monitor -- Left arm --      Pain Score       4           Vitals:    01/16/21 0715 01/16/21 0945   BP: 153/92 170/90   Pulse: (!) 110 92         Visual Acuity      ED Medications  Medications - No data to display    Diagnostic Studies  Results Reviewed     Procedure Component Value Units Date/Time    Smear Review(Phlebs Do Not Order) [[id number]] Collected: 01/16/21 0750    Lab Status: Final result Specimen: Blood from Arm, Left Updated: 01/16/21 0824     RBC Morphology Normal     Platelet Estimate Adequate     Clumped Platelets None/Negative     Large Platelet Present    Protime-INR [[id number]]  (Normal) Collected: 01/16/21 0750    Lab Status: Final result Specimen: Blood from Arm, Left Updated: 01/16/21 0814     Protime 12 8 seconds      INR 0 97    Comprehensive metabolic panel [[id number]]  (Abnormal) Collected: 01/16/21 0750    Lab Status: Final result Specimen: Blood from Arm, Left Updated: 01/16/21 0813     Sodium 136 mmol/L      Potassium 4 5 mmol/L      Chloride 102 mmol/L      CO2 25 mmol/L      ANION GAP 9 mmol/L      BUN 22 mg/dL      Creatinine 1 10 mg/dL      Glucose 103 mg/dL      Calcium 9 6 mg/dL       U/L       U/L      Alkaline Phosphatase 56 U/L      Total Protein 7 5 g/dL      Albumin 4 4 g/dL      Total Bilirubin 0 70 mg/dL      eGFR 77 ml/min/1 73sq m     Narrative:      [place] guidelines for Chronic Kidney Disease (CKD):     Stage 1 with normal or high GFR (GFR > 90 mL/min/1 73 square meters)    Stage 2 Mild CKD (GFR = 60-89 mL/min/1 73 square meters)    Stage 3A Moderate CKD (GFR = 45-59 mL/min/1 73 square meters)    Stage 3B Moderate CKD (GFR = 30-44 mL/min/1 73 square meters)    Stage 4 Severe CKD (GFR = 15-29 mL/min/1 73 square meters)    Stage 5 End Stage CKD (GFR <15 mL/min/1 73 square meters)  Note: GFR calculation is accurate only with a steady state creatinine    Lipase [[id number]]  (Abnormal) Collected: 01/16/21 0750    Lab Status: Final result Specimen: Blood from Arm, Left Updated: 01/16/21 0813     Lipase <10 u/L     CBC and differential [[id number]]  (Abnormal) Collected: 01/16/21 0750    Lab Status: Final result Specimen: Blood from Arm, Left Updated: 01/16/21 0804     WBC 8 60 Thousand/uL      RBC 5 07 Million/uL      Hemoglobin 16 0 g/dL      Hematocrit 46 8 %      MCV 92 fL      MCH 31 7 pg      MCHC 34 3 g/dL      RDW 12 8 %      MPV 7 7 fL      Platelets 418 Thousands/uL      Neutrophils Relative 68 %      Lymphocytes Relative 20 %      Monocytes Relative 11 %      Eosinophils Relative 1 %      Basophils Relative 1 %      Neutrophils Absolute 5 80 Thousands/µL      Lymphocytes Absolute 1 70 Thousands/µL      Monocytes Absolute 0 90 Thousand/µL      Eosinophils Absolute 0 10 Thousand/µL      Basophils Absolute 0 10 Thousands/µL                  CT abdomen pelvis wo contrast   Final Result by Liban Gr MD (01/16 1169)      Faint haziness within the epigastric mesentery, consistent with mesenteric panniculitis /sclerosing mesenteritis  Suggest follow-up CT in 6-12 months to assess for resolution/stability      The study was marked in EPIC for significant notification  Workstation performed: [id number]                    Procedures  Procedures         ED Course  ED Course as of Jan 16 1003   Sat Jan 16, 2021   6443 Case discuss with Brian Acosta(from GI) recommending to start him on Medrol pack in follow-up in the office  Patient is denying any nausea vomiting or diarrhea at this time    Afebrile      0916 AST(!): 112   0916 Mild elevation in LFTs recommending strict follow-up return to the ED if have worsening pain vomiting or diarrhea    ALT(!): 180                                           MDM    Disposition  Final diagnoses:   Mesenteric panniculitis (HCC)   Abdominal pain   Lab test positive for detection of COVID-19 virus   Elevated LFTs     Time reflects when diagnosis was documented in both MDM as applicable and the Disposition within this note     Time User Action Codes Description Comment    1/16/2021  9:46 AM Reather Hockey Add [K65 4] Mesenteric panniculitis ([facility] [address] )     1/16/2021  9:46 AM Sarah Rothman Add [R10 9] Abdominal pain     1/16/2021  9:47 AM Sarah Rothman Add [U07 1] Lab test positive for detection of COVID-19 virus     1/16/2021  9:49 AM Sarah Rothman Add [R79 89] Elevated LFTs       ED Disposition     ED Disposition Condition Date/Time Comment    Discharge Stable Sat Jan 16, 2021  9:45 AM Cleopatra Gibson discharge to home/self care  Follow-up Information     Follow up With Specialties Details Why Contact Info    Helen Roblero DO Family Medicine Schedule an appointment as soon as possible for a visit in 2 days If symptoms worsen [facility] [address]      Yesi Day MD Gastroenterology Schedule an appointment as soon as possible for a visit in 2 days for mesenteric panniculitis [address]  [phone]            Discharge Medication List as of 1/16/2021  9:49 AM      START taking these medications    Details   methylPREDNISolone 4 MG tablet therapy pack Use as directed on package, Normal         CONTINUE these medications which have NOT CHANGED    Details   apixaban (ELIQUIS) 5 mg Multiple Dosages:Starting Wed 1/13/2021, Last dose on Tue 1/19/2021, THEN Starting Wed 1/20/2021, Last dose on Thu 2/11/2021Take 2 tablets (10 mg total) by mouth 2 (two) times a day for 7 days, THEN 1 tablet (5 mg total) 2 (two) times a day for 23 day s , Normal      lisinopril (ZESTRIL) 10 mg tablet Take 1 tablet (10 mg total) by mouth daily, Starting Tue 5/19/2020, Normal      Omega-3 1000 MG CAPS Take by mouth, Historical Med           No discharge procedures on file      PDMP Review     None          ED Provider  Electronically Signed by           Judy Thompson MD  01/16/21 3229

## 2021-01-26 ENCOUNTER — OFFICE VISIT (OUTPATIENT)
Dept: FAMILY MEDICINE CLINIC | Facility: CLINIC | Age: 51
End: 2021-01-26
Payer: COMMERCIAL

## 2021-01-26 VITALS
WEIGHT: 240 LBS | BODY MASS INDEX: 31.81 KG/M2 | OXYGEN SATURATION: 98 % | RESPIRATION RATE: 18 BRPM | SYSTOLIC BLOOD PRESSURE: 122 MMHG | DIASTOLIC BLOOD PRESSURE: 68 MMHG | TEMPERATURE: 97.9 F | HEART RATE: 74 BPM | HEIGHT: 73 IN

## 2021-01-26 DIAGNOSIS — Z13.31 NEGATIVE DEPRESSION SCREENING: ICD-10-CM

## 2021-01-26 DIAGNOSIS — R10.84 GENERALIZED ABDOMINAL PAIN: Primary | ICD-10-CM

## 2021-01-26 DIAGNOSIS — E66.09 CLASS 1 OBESITY DUE TO EXCESS CALORIES WITH SERIOUS COMORBIDITY AND BODY MASS INDEX (BMI) OF 31.0 TO 31.9 IN ADULT: ICD-10-CM

## 2021-01-26 DIAGNOSIS — J30.1 SEASONAL ALLERGIC RHINITIS DUE TO POLLEN: ICD-10-CM

## 2021-01-26 DIAGNOSIS — I10 BENIGN ESSENTIAL HYPERTENSION: ICD-10-CM

## 2021-01-26 PROCEDURE — 3074F SYST BP LT 130 MM HG: CPT | Performed by: FAMILY MEDICINE

## 2021-01-26 PROCEDURE — 3008F BODY MASS INDEX DOCD: CPT | Performed by: FAMILY MEDICINE

## 2021-01-26 PROCEDURE — 3078F DIAST BP <80 MM HG: CPT | Performed by: FAMILY MEDICINE

## 2021-01-26 PROCEDURE — 3725F SCREEN DEPRESSION PERFORMED: CPT | Performed by: FAMILY MEDICINE

## 2021-01-26 PROCEDURE — 1036F TOBACCO NON-USER: CPT | Performed by: FAMILY MEDICINE

## 2021-01-26 PROCEDURE — 99213 OFFICE O/P EST LOW 20 MIN: CPT | Performed by: FAMILY MEDICINE

## 2021-01-26 RX ORDER — LISINOPRIL 10 MG/1
10 TABLET ORAL DAILY
Qty: 90 TABLET | Refills: 1 | Status: SHIPPED | OUTPATIENT
Start: 2021-01-26 | End: 2021-10-18 | Stop reason: SDUPTHER

## 2021-02-05 ENCOUNTER — TELEPHONE (OUTPATIENT)
Dept: HEMATOLOGY ONCOLOGY | Facility: CLINIC | Age: 51
End: 2021-02-05

## 2021-02-05 NOTE — TELEPHONE ENCOUNTER
LVM for pt that his appt for 2/11/21 @ 2:00 consult with Dr Kyrie Contreras has been changed to 3/2/21 @ 9:00  We needed to get a cancer consult on the schedule

## 2021-02-08 DIAGNOSIS — I82.90 VENOUS THROMBOSIS: ICD-10-CM

## 2021-02-17 ENCOUNTER — OFFICE VISIT (OUTPATIENT)
Dept: FAMILY MEDICINE CLINIC | Facility: CLINIC | Age: 51
End: 2021-02-17
Payer: COMMERCIAL

## 2021-02-17 VITALS
HEIGHT: 73 IN | TEMPERATURE: 98.6 F | WEIGHT: 241.8 LBS | SYSTOLIC BLOOD PRESSURE: 120 MMHG | BODY MASS INDEX: 32.05 KG/M2 | HEART RATE: 98 BPM | OXYGEN SATURATION: 98 % | DIASTOLIC BLOOD PRESSURE: 86 MMHG

## 2021-02-17 DIAGNOSIS — J01.00 ACUTE MAXILLARY SINUSITIS, RECURRENCE NOT SPECIFIED: Primary | ICD-10-CM

## 2021-02-17 PROCEDURE — 3074F SYST BP LT 130 MM HG: CPT | Performed by: FAMILY MEDICINE

## 2021-02-17 PROCEDURE — 3725F SCREEN DEPRESSION PERFORMED: CPT | Performed by: FAMILY MEDICINE

## 2021-02-17 PROCEDURE — 99213 OFFICE O/P EST LOW 20 MIN: CPT | Performed by: FAMILY MEDICINE

## 2021-02-17 PROCEDURE — 3079F DIAST BP 80-89 MM HG: CPT | Performed by: FAMILY MEDICINE

## 2021-02-17 RX ORDER — AZITHROMYCIN 250 MG/1
TABLET, FILM COATED ORAL
Qty: 6 TABLET | Refills: 0 | Status: SHIPPED | OUTPATIENT
Start: 2021-02-17 | End: 2021-02-22

## 2021-02-17 RX ORDER — METHYLPREDNISOLONE 4 MG/1
TABLET ORAL
Qty: 21 EACH | Refills: 0 | Status: SHIPPED | OUTPATIENT
Start: 2021-02-17 | End: 2021-07-19 | Stop reason: ALTCHOICE

## 2021-02-17 NOTE — PROGRESS NOTES
Assessment/Plan:    No problem-specific Assessment & Plan notes found for this encounter  Diagnoses and all orders for this visit:    Acute maxillary sinusitis, recurrence not specified  -     azithromycin (Zithromax) 250 mg tablet; Take 2 tablets (500 mg total) by mouth daily for 1 day, THEN 1 tablet (250 mg total) daily for 4 days  -     methylPREDNISolone 4 MG tablet therapy pack; Use as directed on package          PHQ-9 Depression Screening    PHQ-9:   Frequency of the following problems over the past two weeks:      Little interest or pleasure in doing things: 0 - not at all  Feeling down, depressed, or hopeless: 0 - not at all  PHQ-2 Score: 0            Subjective:      Patient ID: Timothy Oconnor is a 44 y o  male  Sinusitis  This is a new problem  The current episode started in the past 7 days  The problem is unchanged  There has been no fever  Associated symptoms include congestion, coughing and sinus pressure  Pertinent negatives include no chills or shortness of breath  Past treatments include nothing  The treatment provided no relief  The following portions of the patient's history were reviewed and updated as appropriate: allergies, current medications, past family history, past medical history, past social history, past surgical history and problem list     Review of Systems   Constitutional: Negative for chills and fever  HENT: Positive for congestion, postnasal drip, rhinorrhea, sinus pressure and sinus pain  Respiratory: Positive for cough  Negative for shortness of breath and wheezing  Objective:    /86 (BP Location: Left arm, Patient Position: Sitting, Cuff Size: Large)   Pulse 98   Temp 98 6 °F (37 °C)   Ht 6' 1" (1 854 m)   Wt 110 kg (241 lb 12 8 oz)   SpO2 98%   BMI 31 90 kg/m²      Physical Exam  Vitals signs and nursing note reviewed  Constitutional:       General: He is not in acute distress  Appearance: Normal appearance   He is well-developed  He is not ill-appearing, toxic-appearing or diaphoretic  HENT:      Head: Normocephalic and atraumatic  Right Ear: Tympanic membrane, ear canal and external ear normal  There is no impacted cerumen  Left Ear: Tympanic membrane, ear canal and external ear normal  There is no impacted cerumen  Nose: Mucosal edema, congestion and rhinorrhea present  Mouth/Throat:      Mouth: Mucous membranes are moist       Pharynx: No oropharyngeal exudate  Eyes:      General:         Right eye: No discharge  Left eye: No discharge  Neck:      Musculoskeletal: Normal range of motion and neck supple  Cardiovascular:      Rate and Rhythm: Normal rate and regular rhythm  Heart sounds: Normal heart sounds  No murmur  Pulmonary:      Effort: Pulmonary effort is normal  No respiratory distress  Breath sounds: Normal breath sounds  No stridor  No wheezing or rales  Chest:      Chest wall: No tenderness  Lymphadenopathy:      Cervical: No cervical adenopathy  Skin:     General: Skin is warm and dry  Findings: No rash  Neurological:      Mental Status: He is alert and oriented to person, place, and time  Psychiatric:         Mood and Affect: Mood normal          Behavior: Behavior normal          Thought Content:  Thought content normal          Judgment: Judgment normal

## 2021-03-02 ENCOUNTER — CONSULT (OUTPATIENT)
Dept: HEMATOLOGY ONCOLOGY | Facility: CLINIC | Age: 51
End: 2021-03-02
Payer: COMMERCIAL

## 2021-03-02 VITALS
BODY MASS INDEX: 32.51 KG/M2 | HEART RATE: 100 BPM | OXYGEN SATURATION: 98 % | WEIGHT: 245.3 LBS | DIASTOLIC BLOOD PRESSURE: 82 MMHG | TEMPERATURE: 98.4 F | HEIGHT: 73 IN | SYSTOLIC BLOOD PRESSURE: 118 MMHG | RESPIRATION RATE: 18 BRPM

## 2021-03-02 DIAGNOSIS — Z83.2 FAMILY HISTORY OF CLOTTING DISORDER: ICD-10-CM

## 2021-03-02 DIAGNOSIS — I82.4Y1 ACUTE DEEP VEIN THROMBOSIS (DVT) OF PROXIMAL VEIN OF RIGHT LOWER EXTREMITY (HCC): Primary | ICD-10-CM

## 2021-03-02 PROCEDURE — 99204 OFFICE O/P NEW MOD 45 MIN: CPT | Performed by: INTERNAL MEDICINE

## 2021-03-02 NOTE — PROGRESS NOTES
Hematology/Oncology Outpatient Consult  Radha Fortune 44 y o  male 1970 [id number]    Date:  3/2/2021    Assessment and Plan:  1  Acute deep vein thrombosis (DVT) of proximal vein of right lower extremity (HCC)    The patient developed a 2nd provoked deep vein thrombosis of the right lower extremity after the recent COVID-19 infection  The patient was told that the usual recommendation after the 2nd episode of clotting event is to consider indefinite anticoagulation even if it is a provoked event  The patient does not seem to be interested in indefinite anticoagulation at least at this point in time  He was given the option of continuing the full dose Eliquis anticoagulation for 3 months then decreasing the dose to 2 5 mg twice a day for at least a year to decrease the chance of clotting or bleeding  We will also run some extra tests to rule out antiphospholipid antibody syndrome  The patient seems to be interested in coming back in the middle of April when he completes 3 months worth of full anticoagulation to decide on the next step  He seems to be leaning toward the lower dose of Eliquis 2 5 mg twice a day  - CBC and differential; Future  - Comprehensive metabolic panel; Future  - Magnesium; Future  - C-reactive protein; Future  - Sedimentation rate, automated; Future  - LD,Blood; Future  - Cardiolipin antibody; Future  - Beta-2 glycoprotein antibodies; Future  - apixaban (ELIQUIS) 5 mg; Take 1 tablet (5 mg total) by mouth 2 (two) times a day  Dispense: 60 tablet; Refill: 3    2  Family history of clotting disorder  He seems to have significant family history for clotting  Some limited workup will be done prior to his next visit  - Factor 5 leiden; Future  - Prothrombin gene mutation;  Future      HPI:   this is a 77-year-old male with history of  Hypertension,varicose veins status post  Superficial venous stripping of the right lower extremity of followed by a deep vein thrombosis in the right leg around 12 years ago  At that time the patient was on anticoagulation with Coumadin for about 3 months  He also developed multiple episodes  Of upper and lower extremity for superficial phlebitis  He also seems to have a family history of DVT including his mother  The patient stated that he was diagnosed with active COVID-19 infection around the middle of January  He was sick only for 2 days around that time  He then developed some pain in the right lower extremity which was evaluated with a Doppler ultrasound on 01/13/2021  The study showed evidence of acute, nonocclusive deep vein thrombosis in the gastrocnemius veins in the proximal calf  He was then started on Eliquis and he continues to take it 5 mg twice a day  He also had a CT scan of the abdomen pelvis on 01/16/2021 due to abdominal pain which showed faint 8 his anus within the epigastric mesentery consistent with mesenteric panniculitis /sclerosing mesenteritis  Follow-up CT scan in 6-12 months was recommended  His most recent available blood work from 01/16/2021 showed normal CBC with high liver enzymes including AST of 112 ALT of 180 and creatinine of 1 1  The patient is up-to-date on colonoscopy  He denies bleeding from any sites  Interval history:    He came in today to discuss the length of anticoagulation  ROS: Review of Systems   Constitutional: Negative for chills and fever  HENT: Negative for ear pain and sore throat  Eyes: Negative for pain and visual disturbance  Respiratory: Negative for cough and shortness of breath  Cardiovascular: Negative for chest pain and palpitations  Gastrointestinal: Negative for abdominal pain and vomiting  Genitourinary: Negative for dysuria and hematuria  Musculoskeletal: Negative for arthralgias and back pain  Skin: Negative for color change and rash  Neurological: Negative for seizures and syncope  All other systems reviewed and are negative        Past Medical History:   Diagnosis Date    BPH with obstruction/lower urinary tract symptoms     Deep vein thrombosis (DVT) (HCC)     Erectile dysfunction due to arterial insufficiency     Hypertension     No known health problems     history of denial of any significant medical , no pertinent past medical history per allscripts    Testicular hypogonadism     Varicocele        Past Surgical History:   Procedure Laterality Date    EYE SURGERY      INGUINAL HERNIA REPAIR      UMBILICAL HERNIA REPAIR      per allscripts    VASECTOMY      VEIN LIGATION AND STRIPPING         Social History     Socioeconomic History    Marital status: /Civil Union     Spouse name: None    Number of children: 2    Years of education: None    Highest education level: None   Occupational History    Occupation:    Social Needs    Financial resource strain: None    Food insecurity     Worry: None     Inability: None    Transportation needs     Medical: None     Non-medical: None   Tobacco Use    Smoking status: Never Smoker    Smokeless tobacco: Never Used   Substance and Sexual Activity    Alcohol use: Yes     Frequency: Monthly or less     Comment: rare, social, never drank alcohol per allscripts    Drug use: No    Sexual activity: None   Lifestyle    Physical activity     Days per week: None     Minutes per session: None    Stress: None   Relationships    Social connections     Talks on phone: None     Gets together: None     Attends Worship service: None     Active member of club or organization: None     Attends meetings of clubs or organizations: None     Relationship status: None    Intimate partner violence     Fear of current or ex partner: None     Emotionally abused: None     Physically abused: None     Forced sexual activity: None   Other Topics Concern    None   Social History Narrative    None       Family History   Problem Relation Age of Onset    Heart disease Mother     Heart attack Mother     Breast cancer Mother     Hypertension Mother     Cerebral aneurysm Mother     Prostate cancer Father     Bone cancer Father     Diabetes type II Father     Testicular cancer Cousin     Coronary artery disease Maternal Grandmother     Colon cancer Paternal Grandmother     No Known Problems Son     No Known Problems Son        Allergies   Allergen Reactions    Penicillins     Iodine (Food Allergy) Other (See Comments)     Mild allergic like reaction to iohexol (self limited sensation of throat tightening, not requiring treatment)  Current Outpatient Medications:     apixaban (ELIQUIS) 5 mg, Take 1 tablet (5 mg total) by mouth 2 (two) times a day, Disp: 60 tablet, Rfl: 0    lisinopril (ZESTRIL) 10 mg tablet, Take 1 tablet (10 mg total) by mouth daily, Disp: 90 tablet, Rfl: 1    methylPREDNISolone 4 MG tablet therapy pack, Use as directed on package, Disp: 21 each, Rfl: 0    Omega-3 1000 MG CAPS, Take by mouth, Disp: , Rfl:     apixaban (ELIQUIS) 5 mg, Take 2 tablets (10 mg total) by mouth 2 (two) times a day for 7 days, THEN 1 tablet (5 mg total) 2 (two) times a day for 23 days  (Patient not taking: Reported on 2/17/2021), Disp: 74 tablet, Rfl: 0    apixaban (ELIQUIS) 5 mg, Take 1 tablet (5 mg total) by mouth 2 (two) times a day, Disp: 60 tablet, Rfl: 3      Physical Exam:  /82 (BP Location: Left arm, Patient Position: Sitting, Cuff Size: Large)   Pulse 100   Temp 98 4 °F (36 9 °C) (Tympanic)   Resp 18   Ht 6' 1" (1 854 m)   Wt 111 kg (245 lb 4 8 oz)   SpO2 98%   BMI 32 36 kg/m²     Physical Exam  Constitutional:       Appearance: He is well-developed  He is obese  HENT:      Head: Normocephalic and atraumatic  Eyes:      General: No scleral icterus  Right eye: No discharge  Left eye: No discharge  Conjunctiva/sclera: Conjunctivae normal       Pupils: Pupils are equal, round, and reactive to light     Neck:      Musculoskeletal: Normal range of motion and neck supple  Thyroid: No thyromegaly  Trachea: No tracheal deviation  Cardiovascular:      Rate and Rhythm: Normal rate and regular rhythm  Heart sounds: Normal heart sounds  No murmur  No friction rub  Pulmonary:      Effort: Pulmonary effort is normal  No respiratory distress  Breath sounds: Normal breath sounds  No wheezing or rales  Chest:      Chest wall: No tenderness  Abdominal:      General: There is no distension  Palpations: Abdomen is soft  There is no hepatomegaly or splenomegaly  Tenderness: There is no abdominal tenderness  There is no guarding or rebound  Musculoskeletal: Normal range of motion  General: No tenderness or deformity  Lymphadenopathy:      Cervical: No cervical adenopathy  Skin:     General: Skin is warm and dry  Coloration: Skin is not pale  Findings: No erythema or rash  Neurological:      Mental Status: He is alert and oriented to person, place, and time  Cranial Nerves: No cranial nerve deficit  Coordination: Coordination normal       Deep Tendon Reflexes: Reflexes are normal and symmetric  Reflexes normal    Psychiatric:         Behavior: Behavior normal          Thought Content: Thought content normal          Judgment: Judgment normal            Labs:  Lab Results   Component Value Date    WBC 8 60 01/16/2021    HGB 16 0 01/16/2021    HCT 46 8 01/16/2021    MCV 92 01/16/2021     01/16/2021     Lab Results   Component Value Date    K 4 5 01/16/2021     01/16/2021    CO2 25 01/16/2021    BUN 22 01/16/2021    CREATININE 1 10 01/16/2021    GLUF 101 (H) 03/10/2020    CALCIUM 9 6 01/16/2021     (H) 01/16/2021     (H) 01/16/2021    ALKPHOS 56 01/16/2021    EGFR 77 01/16/2021       Patient voiced understanding and agreement in the above discussion  Aware to contact our office with questions/symptoms in the interim

## 2021-03-10 ENCOUNTER — APPOINTMENT (OUTPATIENT)
Dept: LAB | Facility: CLINIC | Age: 51
End: 2021-03-10
Payer: COMMERCIAL

## 2021-03-10 DIAGNOSIS — E78.00 HYPERCHOLESTEROLEMIA: ICD-10-CM

## 2021-03-10 DIAGNOSIS — Z11.4 SCREENING FOR HIV WITHOUT PRESENCE OF RISK FACTORS: ICD-10-CM

## 2021-03-10 DIAGNOSIS — Z12.5 SCREENING FOR PROSTATE CANCER: ICD-10-CM

## 2021-03-10 DIAGNOSIS — I10 BENIGN ESSENTIAL HYPERTENSION: ICD-10-CM

## 2021-03-10 LAB
ALBUMIN SERPL BCP-MCNC: 4.1 G/DL (ref 3.5–5)
ALP SERPL-CCNC: 66 U/L (ref 46–116)
ALT SERPL W P-5'-P-CCNC: 36 U/L (ref 12–78)
ANION GAP SERPL CALCULATED.3IONS-SCNC: 5 MMOL/L (ref 4–13)
AST SERPL W P-5'-P-CCNC: 17 U/L (ref 5–45)
BASOPHILS # BLD AUTO: 0.04 THOUSANDS/ΜL (ref 0–0.1)
BASOPHILS NFR BLD AUTO: 1 % (ref 0–1)
BILIRUB SERPL-MCNC: 0.8 MG/DL (ref 0.2–1)
BUN SERPL-MCNC: 22 MG/DL (ref 5–25)
CALCIUM SERPL-MCNC: 9 MG/DL (ref 8.3–10.1)
CHLORIDE SERPL-SCNC: 107 MMOL/L (ref 100–108)
CHOLEST SERPL-MCNC: 180 MG/DL (ref 50–200)
CO2 SERPL-SCNC: 26 MMOL/L (ref 21–32)
CREAT SERPL-MCNC: 1.08 MG/DL (ref 0.6–1.3)
EOSINOPHIL # BLD AUTO: 0.09 THOUSAND/ΜL (ref 0–0.61)
EOSINOPHIL NFR BLD AUTO: 1 % (ref 0–6)
ERYTHROCYTE [DISTWIDTH] IN BLOOD BY AUTOMATED COUNT: 12.5 % (ref 11.6–15.1)
GFR SERPL CREATININE-BSD FRML MDRD: 78 ML/MIN/1.73SQ M
GLUCOSE P FAST SERPL-MCNC: 87 MG/DL (ref 65–99)
HCT VFR BLD AUTO: 46.4 % (ref 36.5–49.3)
HDLC SERPL-MCNC: 52 MG/DL
HGB BLD-MCNC: 16 G/DL (ref 12–17)
IMM GRANULOCYTES # BLD AUTO: 0.01 THOUSAND/UL (ref 0–0.2)
IMM GRANULOCYTES NFR BLD AUTO: 0 % (ref 0–2)
LDLC SERPL CALC-MCNC: 118 MG/DL (ref 0–100)
LYMPHOCYTES # BLD AUTO: 2.11 THOUSANDS/ΜL (ref 0.6–4.47)
LYMPHOCYTES NFR BLD AUTO: 30 % (ref 14–44)
MCH RBC QN AUTO: 31.8 PG (ref 26.8–34.3)
MCHC RBC AUTO-ENTMCNC: 34.5 G/DL (ref 31.4–37.4)
MCV RBC AUTO: 92 FL (ref 82–98)
MONOCYTES # BLD AUTO: 0.81 THOUSAND/ΜL (ref 0.17–1.22)
MONOCYTES NFR BLD AUTO: 12 % (ref 4–12)
NEUTROPHILS # BLD AUTO: 3.9 THOUSANDS/ΜL (ref 1.85–7.62)
NEUTS SEG NFR BLD AUTO: 56 % (ref 43–75)
NONHDLC SERPL-MCNC: 128 MG/DL
NRBC BLD AUTO-RTO: 0 /100 WBCS
PLATELET # BLD AUTO: 208 THOUSANDS/UL (ref 149–390)
PMV BLD AUTO: 9.5 FL (ref 8.9–12.7)
POTASSIUM SERPL-SCNC: 4.5 MMOL/L (ref 3.5–5.3)
PROT SERPL-MCNC: 7.7 G/DL (ref 6.4–8.2)
PSA SERPL-MCNC: 0.5 NG/ML (ref 0–4)
RBC # BLD AUTO: 5.03 MILLION/UL (ref 3.88–5.62)
SODIUM SERPL-SCNC: 138 MMOL/L (ref 136–145)
TRIGL SERPL-MCNC: 51 MG/DL
TSH SERPL DL<=0.05 MIU/L-ACNC: 2.39 UIU/ML (ref 0.36–3.74)
WBC # BLD AUTO: 6.96 THOUSAND/UL (ref 4.31–10.16)

## 2021-03-10 PROCEDURE — 80053 COMPREHEN METABOLIC PANEL: CPT

## 2021-03-10 PROCEDURE — 80061 LIPID PANEL: CPT

## 2021-03-10 PROCEDURE — 84443 ASSAY THYROID STIM HORMONE: CPT

## 2021-03-10 PROCEDURE — 87389 HIV-1 AG W/HIV-1&-2 AB AG IA: CPT

## 2021-03-10 PROCEDURE — 85025 COMPLETE CBC W/AUTO DIFF WBC: CPT

## 2021-03-10 PROCEDURE — G0103 PSA SCREENING: HCPCS

## 2021-03-10 PROCEDURE — 36415 COLL VENOUS BLD VENIPUNCTURE: CPT

## 2021-03-11 LAB — HIV 1+2 AB+HIV1 P24 AG SERPL QL IA: NORMAL

## 2021-03-12 ENCOUNTER — OFFICE VISIT (OUTPATIENT)
Dept: FAMILY MEDICINE CLINIC | Facility: CLINIC | Age: 51
End: 2021-03-12
Payer: COMMERCIAL

## 2021-03-12 VITALS
OXYGEN SATURATION: 98 % | HEIGHT: 73 IN | HEART RATE: 83 BPM | WEIGHT: 242.2 LBS | SYSTOLIC BLOOD PRESSURE: 130 MMHG | TEMPERATURE: 96.8 F | DIASTOLIC BLOOD PRESSURE: 80 MMHG | BODY MASS INDEX: 32.1 KG/M2

## 2021-03-12 DIAGNOSIS — Z23 ENCOUNTER FOR IMMUNIZATION: ICD-10-CM

## 2021-03-12 DIAGNOSIS — Z00.00 ANNUAL PHYSICAL EXAM: Primary | ICD-10-CM

## 2021-03-12 DIAGNOSIS — E78.00 HYPERCHOLESTEROLEMIA: ICD-10-CM

## 2021-03-12 DIAGNOSIS — I10 BENIGN ESSENTIAL HYPERTENSION: ICD-10-CM

## 2021-03-12 PROCEDURE — 99396 PREV VISIT EST AGE 40-64: CPT | Performed by: FAMILY MEDICINE

## 2021-03-12 PROCEDURE — 3008F BODY MASS INDEX DOCD: CPT | Performed by: FAMILY MEDICINE

## 2021-03-12 PROCEDURE — 90750 HZV VACC RECOMBINANT IM: CPT

## 2021-03-12 PROCEDURE — 90471 IMMUNIZATION ADMIN: CPT

## 2021-03-12 PROCEDURE — 1036F TOBACCO NON-USER: CPT | Performed by: FAMILY MEDICINE

## 2021-03-12 RX ORDER — UBIDECARENONE 50 MG
CAPSULE ORAL
COMMUNITY

## 2021-03-12 NOTE — PROGRESS NOTES
Assessment/Plan:    No problem-specific Assessment & Plan notes found for this encounter  Diagnoses and all orders for this visit:    Annual physical exam    Hypercholesterolemia  Comments:  pt counseled on diet and exercise    Encounter for immunization  -     Zoster Vaccine Recombinant IM    Benign essential hypertension  Comments:  no change in the medication    Other orders  -     Cancel: Zoster Vaccine Recombinant IM  -     Red Yeast Rice 600 MG TABS; Take by mouth          PHQ-9 Depression Screening    PHQ-9:   Frequency of the following problems over the past two weeks:      Little interest or pleasure in doing things: 0 - not at all  Feeling down, depressed, or hopeless: 0 - not at all  PHQ-2 Score: 0            Subjective:      Patient ID: Vilma Judd is a 44 y o  male  Pt here for annual physical      The following portions of the patient's history were reviewed and updated as appropriate: allergies, current medications, past family history, past medical history, past social history, past surgical history and problem list     Review of Systems   Constitutional: Negative  Negative for chills, fatigue and fever  HENT: Negative  Eyes: Negative  Respiratory: Negative for shortness of breath and wheezing  Cardiovascular: Negative for chest pain and palpitations  Gastrointestinal: Negative for abdominal pain, blood in stool, constipation, diarrhea, nausea and vomiting  Endocrine: Negative  Genitourinary: Negative for difficulty urinating and dysuria  Musculoskeletal: Negative for arthralgias and myalgias  Skin: Negative  Allergic/Immunologic: Negative  Neurological: Negative for seizures and syncope  Hematological: Negative for adenopathy  Psychiatric/Behavioral: Negative            Objective:    /80 (BP Location: Left arm, Patient Position: Sitting, Cuff Size: Large)   Pulse 83   Temp (!) 96 8 °F (36 °C)   Ht 6' 1" (1 854 m)   Wt 110 kg (242 lb 3 2 oz) SpO2 98%   BMI 31 95 kg/m²      Physical Exam  Vitals signs and nursing note reviewed  Constitutional:       General: He is not in acute distress  Appearance: Normal appearance  He is well-developed  He is not ill-appearing, toxic-appearing or diaphoretic  HENT:      Head: Normocephalic and atraumatic  Right Ear: Tympanic membrane, ear canal and external ear normal  There is no impacted cerumen  Left Ear: Tympanic membrane, ear canal and external ear normal  There is no impacted cerumen  Nose: Nose normal  No congestion or rhinorrhea  Mouth/Throat:      Mouth: Mucous membranes are moist       Pharynx: No oropharyngeal exudate or posterior oropharyngeal erythema  Eyes:      General: No scleral icterus  Right eye: No discharge  Left eye: No discharge  Extraocular Movements: Extraocular movements intact  Conjunctiva/sclera: Conjunctivae normal       Pupils: Pupils are equal, round, and reactive to light  Neck:      Musculoskeletal: Normal range of motion and neck supple  No neck rigidity  Cardiovascular:      Rate and Rhythm: Normal rate and regular rhythm  Pulses: Normal pulses  Heart sounds: Normal heart sounds  No murmur  No friction rub  No gallop  Pulmonary:      Effort: Pulmonary effort is normal  No respiratory distress  Breath sounds: Normal breath sounds  No wheezing, rhonchi or rales  Abdominal:      General: Bowel sounds are normal  There is no distension  Palpations: Abdomen is soft  There is no mass  Tenderness: There is no abdominal tenderness  There is no guarding or rebound  Musculoskeletal: Normal range of motion  General: No swelling or deformity  Right lower leg: No edema  Left lower leg: No edema  Lymphadenopathy:      Cervical: No cervical adenopathy  Skin:     General: Skin is warm and dry  Findings: No rash  Neurological:      General: No focal deficit present        Mental Status: He is alert and oriented to person, place, and time  Sensory: No sensory deficit  Motor: No weakness  Coordination: Coordination normal       Gait: Gait normal       Deep Tendon Reflexes: Reflexes are normal and symmetric  Reflexes normal    Psychiatric:         Mood and Affect: Mood normal          Behavior: Behavior normal          Thought Content:  Thought content normal          Judgment: Judgment normal

## 2021-04-08 ENCOUNTER — TELEPHONE (OUTPATIENT)
Dept: HEMATOLOGY ONCOLOGY | Facility: CLINIC | Age: 51
End: 2021-04-08

## 2021-04-09 ENCOUNTER — APPOINTMENT (OUTPATIENT)
Dept: LAB | Facility: CLINIC | Age: 51
End: 2021-04-09
Payer: COMMERCIAL

## 2021-04-09 DIAGNOSIS — Z83.2 FAMILY HISTORY OF CLOTTING DISORDER: ICD-10-CM

## 2021-04-09 DIAGNOSIS — I82.4Y1 ACUTE DEEP VEIN THROMBOSIS (DVT) OF PROXIMAL VEIN OF RIGHT LOWER EXTREMITY (HCC): ICD-10-CM

## 2021-04-09 LAB
ALBUMIN SERPL BCP-MCNC: 4 G/DL (ref 3.5–5)
ALP SERPL-CCNC: 58 U/L (ref 46–116)
ALT SERPL W P-5'-P-CCNC: 28 U/L (ref 12–78)
ANION GAP SERPL CALCULATED.3IONS-SCNC: 7 MMOL/L (ref 4–13)
AST SERPL W P-5'-P-CCNC: 18 U/L (ref 5–45)
BASOPHILS # BLD AUTO: 0.05 THOUSANDS/ΜL (ref 0–0.1)
BASOPHILS NFR BLD AUTO: 1 % (ref 0–1)
BILIRUB SERPL-MCNC: 0.9 MG/DL (ref 0.2–1)
BUN SERPL-MCNC: 18 MG/DL (ref 5–25)
CALCIUM SERPL-MCNC: 9 MG/DL (ref 8.3–10.1)
CHLORIDE SERPL-SCNC: 104 MMOL/L (ref 100–108)
CO2 SERPL-SCNC: 27 MMOL/L (ref 21–32)
CREAT SERPL-MCNC: 1.06 MG/DL (ref 0.6–1.3)
CRP SERPL QL: <3 MG/L
EOSINOPHIL # BLD AUTO: 0.12 THOUSAND/ΜL (ref 0–0.61)
EOSINOPHIL NFR BLD AUTO: 2 % (ref 0–6)
ERYTHROCYTE [DISTWIDTH] IN BLOOD BY AUTOMATED COUNT: 12.7 % (ref 11.6–15.1)
ERYTHROCYTE [SEDIMENTATION RATE] IN BLOOD: 5 MM/HOUR (ref 0–19)
GFR SERPL CREATININE-BSD FRML MDRD: 80 ML/MIN/1.73SQ M
GLUCOSE P FAST SERPL-MCNC: 85 MG/DL (ref 65–99)
HCT VFR BLD AUTO: 46 % (ref 36.5–49.3)
HGB BLD-MCNC: 15.5 G/DL (ref 12–17)
IMM GRANULOCYTES # BLD AUTO: 0.02 THOUSAND/UL (ref 0–0.2)
IMM GRANULOCYTES NFR BLD AUTO: 0 % (ref 0–2)
LDH SERPL-CCNC: 202 U/L (ref 81–234)
LYMPHOCYTES # BLD AUTO: 1.9 THOUSANDS/ΜL (ref 0.6–4.47)
LYMPHOCYTES NFR BLD AUTO: 26 % (ref 14–44)
MAGNESIUM SERPL-MCNC: 2.1 MG/DL (ref 1.6–2.6)
MCH RBC QN AUTO: 31.4 PG (ref 26.8–34.3)
MCHC RBC AUTO-ENTMCNC: 33.7 G/DL (ref 31.4–37.4)
MCV RBC AUTO: 93 FL (ref 82–98)
MONOCYTES # BLD AUTO: 0.75 THOUSAND/ΜL (ref 0.17–1.22)
MONOCYTES NFR BLD AUTO: 10 % (ref 4–12)
NEUTROPHILS # BLD AUTO: 4.52 THOUSANDS/ΜL (ref 1.85–7.62)
NEUTS SEG NFR BLD AUTO: 61 % (ref 43–75)
NRBC BLD AUTO-RTO: 0 /100 WBCS
PLATELET # BLD AUTO: 209 THOUSANDS/UL (ref 149–390)
PMV BLD AUTO: 9.8 FL (ref 8.9–12.7)
POTASSIUM SERPL-SCNC: 3.7 MMOL/L (ref 3.5–5.3)
PROT SERPL-MCNC: 7.4 G/DL (ref 6.4–8.2)
RBC # BLD AUTO: 4.94 MILLION/UL (ref 3.88–5.62)
SODIUM SERPL-SCNC: 138 MMOL/L (ref 136–145)
WBC # BLD AUTO: 7.36 THOUSAND/UL (ref 4.31–10.16)

## 2021-04-09 PROCEDURE — 85652 RBC SED RATE AUTOMATED: CPT

## 2021-04-09 PROCEDURE — 81241 F5 GENE: CPT

## 2021-04-09 PROCEDURE — 83735 ASSAY OF MAGNESIUM: CPT

## 2021-04-09 PROCEDURE — 86140 C-REACTIVE PROTEIN: CPT

## 2021-04-09 PROCEDURE — 36415 COLL VENOUS BLD VENIPUNCTURE: CPT

## 2021-04-09 PROCEDURE — 80053 COMPREHEN METABOLIC PANEL: CPT

## 2021-04-09 PROCEDURE — 81240 F2 GENE: CPT

## 2021-04-09 PROCEDURE — 86146 BETA-2 GLYCOPROTEIN ANTIBODY: CPT

## 2021-04-09 PROCEDURE — 85025 COMPLETE CBC W/AUTO DIFF WBC: CPT

## 2021-04-09 PROCEDURE — 86147 CARDIOLIPIN ANTIBODY EA IG: CPT

## 2021-04-09 PROCEDURE — 83615 LACTATE (LD) (LDH) ENZYME: CPT

## 2021-04-13 ENCOUNTER — TELEPHONE (OUTPATIENT)
Dept: HEMATOLOGY ONCOLOGY | Facility: CLINIC | Age: 51
End: 2021-04-13

## 2021-04-13 LAB
B2 GLYCOPROT1 IGA SERPL IA-ACNC: 2.4
B2 GLYCOPROT1 IGG SERPL IA-ACNC: 1.1
B2 GLYCOPROT1 IGM SERPL IA-ACNC: <2.9
CARDIOLIPIN IGA SER IA-ACNC: 2.6
CARDIOLIPIN IGG SER IA-ACNC: 1.1
CARDIOLIPIN IGM SER IA-ACNC: <0.8

## 2021-04-13 NOTE — TELEPHONE ENCOUNTER
Reschedule Appointment     Who is calling in Patient    Doctor Appointment Scheduled with Elisabeth Armijo date and time 04/13 at 8:30am   New date and time 04/20 at 8:30am   Location [facility]   Patient verbalized understanding    yes

## 2021-04-14 LAB
F2 GENE MUT ANL BLD/T: NORMAL
F5 GENE MUT ANL BLD/T: NORMAL

## 2021-04-20 ENCOUNTER — TELEPHONE (OUTPATIENT)
Dept: HEMATOLOGY ONCOLOGY | Facility: CLINIC | Age: 51
End: 2021-04-20

## 2021-04-20 NOTE — TELEPHONE ENCOUNTER
Patient missed apt today Tuesday 04/20/21 at 9:30 a m  w/Wendi MONTGOMERY at the [place] office  Called pt and left message to call the office to reschedule missed apt, [phone]

## 2021-04-26 ENCOUNTER — TELEPHONE (OUTPATIENT)
Dept: HEMATOLOGY ONCOLOGY | Facility: CLINIC | Age: 51
End: 2021-04-26

## 2021-04-26 NOTE — TELEPHONE ENCOUNTER
Scheduling Appointment     Who Is Calling to Schedule  Patient    Doctor [employer]   Location [facility] WITH [facility]   Date and Time 06/03/2021 @1130am         Patient verbalized understanding    yes

## 2021-06-03 ENCOUNTER — OFFICE VISIT (OUTPATIENT)
Dept: HEMATOLOGY ONCOLOGY | Facility: CLINIC | Age: 51
End: 2021-06-03
Payer: COMMERCIAL

## 2021-06-03 VITALS
OXYGEN SATURATION: 96 % | RESPIRATION RATE: 16 BRPM | HEIGHT: 73 IN | TEMPERATURE: 96.7 F | HEART RATE: 91 BPM | BODY MASS INDEX: 32.77 KG/M2 | SYSTOLIC BLOOD PRESSURE: 114 MMHG | DIASTOLIC BLOOD PRESSURE: 82 MMHG | WEIGHT: 247.3 LBS

## 2021-06-03 DIAGNOSIS — Z83.2 FAMILY HISTORY OF CLOTTING DISORDER: ICD-10-CM

## 2021-06-03 DIAGNOSIS — I82.4Y1 DEEP VEIN THROMBOSIS (DVT) OF PROXIMAL VEIN OF RIGHT LOWER EXTREMITY, UNSPECIFIED CHRONICITY (HCC): Primary | ICD-10-CM

## 2021-06-03 PROCEDURE — 3008F BODY MASS INDEX DOCD: CPT | Performed by: NURSE PRACTITIONER

## 2021-06-03 PROCEDURE — 99214 OFFICE O/P EST MOD 30 MIN: CPT | Performed by: NURSE PRACTITIONER

## 2021-06-03 PROCEDURE — 1036F TOBACCO NON-USER: CPT | Performed by: NURSE PRACTITIONER

## 2021-06-03 NOTE — PROGRESS NOTES
Hematology/Oncology Outpatient Follow-up  Kristopher Monaco 44 y o  male 1970 [id number]    Date:  6/3/2021      Assessment and Plan:  1  Deep vein thrombosis (DVT) of proximal vein of right lower extremity, unspecified chronicity ([address] )  Patient developed a 2nd provoked DVT of his right lower extremity after his recent COVID-19 infection January 2021  He had a prior provoked DVT after vein surgery in the past along with multiple episodes of superficial phlebitis from IVs in the past   He does have significant family history of thrombosis including his mother  We discussed that the usual recommendation for provoked event is to consider 3-6 months of anticoagulation  However with having 2 separate provoked events along with family history and history of superficial clots suggest that patient is more prone to thrombosis  Long-term anticoagulation should be considered in his case  The patient states that he has thought about the recommendations discussed at his last office visit with Dr Kaylen Diaz  He is interested in continuing with indefinite anticoagulation but with the lower dose as discussed  Studies have shown that long-term anticoagulation with prophylactic dose of Eliquis 2 5 mg twice a day can be as effective with decreased bleeding risk  He will finish out his current remaining bottle of Eliquis 5 mg twice a day and start the lower dose 2 5 mg twice a day indefinitely afterward  Script was sent to his local pharmacy for the new dosing  Patient will continue to follow-up with his primary care team going forward  We will see him back on an as-needed basis  Should ideally have repeat 6-12 month follow-up CT scan of the abdomen and pelvis to follow-up on abnormal findings from January 2021  Will defer to primary care team     - apixaban (Eliquis) 2 5 mg; Take 1 tablet (2 5 mg total) by mouth 2 (two) times a day  Dispense: 60 tablet; Refill: 11    2   Family history of clotting disorder  As above       HPI:  This is a 57-year-old male with history of hypertension, varicose veins status post superficial venous stripping of the right lower extremity of followed by a deep vein thrombosis in the right leg around 12 years ago  At that time the patient was on anticoagulation with Coumadin for about 3 months  He also developed multiple episodes of upper and lower extremity for superficial phlebitis  He also seems to have a family history of DVT including his mother who had had multiple thrombotic events  The patient stated that he was diagnosed with active COVID-19 infection around the middle of January 2021 and was sick only for 2 days around that time  He then developed some pain in the right lower extremity which was evaluated with a Doppler ultrasound on 01/13/2021  The study showed evidence of acute, nonocclusive deep vein thrombosis in the gastrocnemius veins in the proximal calf  He was then started on Eliquis which he continues to take it 5 mg twice a day  He also had a CT scan of the abdomen pelvis on 01/16/2021 due to abdominal pain which showed faint haziness within the epigastric mesentery consistent with mesenteric panniculitis/sclerosing mesenteritis  Follow-up CT scan in 6-12 months was recommended  Interval history:  Patient presents today for follow-up visit  He continues to take the Eliquis 5 mg twice a day which he is tolerating well without any easy bruising or bleeding  He admits that he continues to have some lower extremity edema/discomfort at times which waxes and wanes and has been ongoing  Otherwise has no complaints  He had additional limited workup done recently for his hypercoagulable state on 04/09/2021 which showed normal CBC and CMP  Inflammatory markers were not elevated  LDH normal 202  His anticardiolipin and beta 2 glycoprotein antibodies came back in the normal range ruling out antiphospholipid syndrome    Negative for factor 5 Leiden and prothrombin gene mutation  ROS: Review of Systems   Constitutional: Negative for activity change, appetite change, chills, fatigue, fever and unexpected weight change  HENT: Negative for congestion, mouth sores, nosebleeds, sore throat and trouble swallowing  Eyes: Negative  Respiratory: Negative for cough, chest tightness and shortness of breath  Cardiovascular: Positive for leg swelling  Negative for chest pain and palpitations  Gastrointestinal: Negative for abdominal distention, abdominal pain, blood in stool, constipation, diarrhea, nausea and vomiting  Genitourinary: Negative for difficulty urinating, dysuria, frequency, hematuria and urgency  Musculoskeletal: Negative for arthralgias, back pain, gait problem, joint swelling and myalgias  Skin: Negative for color change, pallor and rash  Neurological: Negative for dizziness, weakness, light-headedness, numbness and headaches  Hematological: Negative for adenopathy  Does not bruise/bleed easily  Psychiatric/Behavioral: Negative for dysphoric mood and sleep disturbance  The patient is not nervous/anxious          Past Medical History:   Diagnosis Date    BPH with obstruction/lower urinary tract symptoms     COVID-19 01/2021    Deep vein thrombosis (DVT) (HCC)     Erectile dysfunction due to arterial insufficiency     Hypertension     No known health problems     history of denial of any significant medical , no pertinent past medical history per allscripts    Testicular hypogonadism     Varicocele        Past Surgical History:   Procedure Laterality Date    EYE SURGERY      INGUINAL HERNIA REPAIR      UMBILICAL HERNIA REPAIR      per allscripts    VASECTOMY      VEIN LIGATION AND STRIPPING         Social History     Socioeconomic History    Marital status: /Civil Union     Spouse name: None    Number of children: 2    Years of education: None    Highest education level: None   Occupational History    Occupation: shipping supervisor   Social Needs    Financial resource strain: None    Food insecurity     Worry: None     Inability: None    Transportation needs     Medical: None     Non-medical: None   Tobacco Use    Smoking status: Never Smoker    Smokeless tobacco: Never Used   Substance and Sexual Activity    Alcohol use: Yes     Frequency: Monthly or less     Comment: rare, social, never drank alcohol per allscripts    Drug use: No    Sexual activity: None   Lifestyle    Physical activity     Days per week: None     Minutes per session: None    Stress: None   Relationships    Social connections     Talks on phone: None     Gets together: None     Attends Anabaptism service: None     Active member of club or organization: None     Attends meetings of clubs or organizations: None     Relationship status: None    Intimate partner violence     Fear of current or ex partner: None     Emotionally abused: None     Physically abused: None     Forced sexual activity: None   Other Topics Concern    None   Social History Narrative    Daily caffeine use- 6 cups of coffee       Family History   Problem Relation Age of Onset    Heart disease Mother     Heart attack Mother     Breast cancer Mother     Hypertension Mother     Cerebral aneurysm Mother     Prostate cancer Father     Bone cancer Father     Diabetes type II Father     Testicular cancer Cousin     Coronary artery disease Maternal Grandmother     Colon cancer Paternal Grandmother     No Known Problems Son     No Known Problems Son        Allergies   Allergen Reactions    Penicillins     Iodine - Food Allergy Other (See Comments)     Mild allergic like reaction to iohexol (self limited sensation of throat tightening, not requiring treatment)            Current Outpatient Medications:     levocetirizine (XYZAL) 5 MG tablet, Take 1 tablet (5 mg total) by mouth every evening, Disp: 30 tablet, Rfl: 11    lisinopril (ZESTRIL) 10 mg tablet, Take 1 tablet (10 mg total) by mouth daily, Disp: 90 tablet, Rfl: 1    Omega-3 1000 MG CAPS, Take by mouth, Disp: , Rfl:     Red Yeast Rice 600 MG TABS, Take by mouth, Disp: , Rfl:     apixaban (Eliquis) 2 5 mg, Take 1 tablet (2 5 mg total) by mouth 2 (two) times a day, Disp: 60 tablet, Rfl: 11    methylPREDNISolone 4 MG tablet therapy pack, Use as directed on package (Patient not taking: Reported on 3/12/2021), Disp: 21 each, Rfl: 0      Physical Exam:  /82 (BP Location: Left arm, Patient Position: Sitting, Cuff Size: Large)   Pulse 91   Temp (!) 96 7 °F (35 9 °C) (Tympanic)   Resp 16   Ht 6' 1" (1 854 m)   Wt 112 kg (247 lb 4 8 oz)   SpO2 96%   BMI 32 63 kg/m²     Physical Exam  Vitals signs reviewed  Constitutional:       General: He is not in acute distress  Appearance: He is well-developed  He is not diaphoretic  HENT:      Head: Normocephalic and atraumatic  Eyes:      General: Lids are normal  No scleral icterus  Conjunctiva/sclera: Conjunctivae normal       Pupils: Pupils are equal, round, and reactive to light  Neck:      Musculoskeletal: Normal range of motion and neck supple  Thyroid: No thyromegaly  Cardiovascular:      Rate and Rhythm: Normal rate and regular rhythm  Heart sounds: Normal heart sounds  No murmur  Pulmonary:      Effort: Pulmonary effort is normal  No respiratory distress  Breath sounds: Normal breath sounds  Abdominal:      General: There is no distension  Palpations: Abdomen is soft  There is no hepatomegaly or splenomegaly  Tenderness: There is no abdominal tenderness  Musculoskeletal: Normal range of motion  General: No swelling  Lymphadenopathy:      Cervical: No cervical adenopathy  Upper Body:      Right upper body: No axillary adenopathy  Left upper body: No axillary adenopathy  Skin:     General: Skin is warm and dry  Findings: No erythema or rash  Neurological:      General: No focal deficit present  Mental Status: He is alert and oriented to person, place, and time  Psychiatric:         Mood and Affect: Mood and affect normal          Behavior: Behavior normal  Behavior is cooperative  Thought Content: Thought content normal          Judgment: Judgment normal            Labs:  Lab Results   Component Value Date    WBC 7 36 04/09/2021    HGB 15 5 04/09/2021    HCT 46 0 04/09/2021    MCV 93 04/09/2021     04/09/2021     Lab Results   Component Value Date    K 3 7 04/09/2021     04/09/2021    CO2 27 04/09/2021    BUN 18 04/09/2021    CREATININE 1 06 04/09/2021    GLUF 85 04/09/2021    CALCIUM 9 0 04/09/2021    AST 18 04/09/2021    ALT 28 04/09/2021    ALKPHOS 58 04/09/2021    EGFR 80 04/09/2021       Patient voiced understanding and agreement in the above discussion  Aware to contact our office with questions/symptoms in the interim  This note has been generated by voice recognition software system  Therefore, there may be spelling, grammar, and or syntax errors  Please contact if questions arise

## 2021-07-19 ENCOUNTER — APPOINTMENT (EMERGENCY)
Dept: RADIOLOGY | Facility: HOSPITAL | Age: 51
End: 2021-07-19
Payer: COMMERCIAL

## 2021-07-19 ENCOUNTER — HOSPITAL ENCOUNTER (EMERGENCY)
Facility: HOSPITAL | Age: 51
Discharge: HOME | End: 2021-07-19
Attending: EMERGENCY MEDICINE | Admitting: EMERGENCY MEDICINE
Payer: COMMERCIAL

## 2021-07-19 ENCOUNTER — APPOINTMENT (EMERGENCY)
Dept: NON INVASIVE DIAGNOSTICS | Facility: HOSPITAL | Age: 51
End: 2021-07-19
Payer: COMMERCIAL

## 2021-07-19 VITALS
OXYGEN SATURATION: 100 % | DIASTOLIC BLOOD PRESSURE: 74 MMHG | WEIGHT: 235 LBS | SYSTOLIC BLOOD PRESSURE: 164 MMHG | TEMPERATURE: 97.8 F | RESPIRATION RATE: 14 BRPM | HEART RATE: 83 BPM | BODY MASS INDEX: 31 KG/M2

## 2021-07-19 DIAGNOSIS — M79.604 RIGHT LEG PAIN: Primary | ICD-10-CM

## 2021-07-19 LAB
ALBUMIN SERPL BCP-MCNC: 4.2 G/DL (ref 3.5–5.7)
ALP SERPL-CCNC: 49 U/L (ref 40–150)
ALT SERPL W P-5'-P-CCNC: 20 U/L (ref 7–52)
ANION GAP SERPL CALCULATED.3IONS-SCNC: 6 MMOL/L (ref 4–13)
AST SERPL W P-5'-P-CCNC: 20 U/L (ref 13–39)
BASOPHILS # BLD AUTO: 0.1 THOUSANDS/ΜL (ref 0–0.1)
BASOPHILS NFR BLD AUTO: 1 % (ref 0–2)
BILIRUB SERPL-MCNC: 0.5 MG/DL (ref 0.2–1)
BUN SERPL-MCNC: 17 MG/DL (ref 7–25)
CALCIUM SERPL-MCNC: 8.9 MG/DL (ref 8.6–10.5)
CHLORIDE SERPL-SCNC: 104 MMOL/L (ref 98–107)
CO2 SERPL-SCNC: 28 MMOL/L (ref 21–31)
CREAT SERPL-MCNC: 1.01 MG/DL (ref 0.7–1.3)
EOSINOPHIL # BLD AUTO: 0.2 THOUSAND/ΜL (ref 0–0.61)
EOSINOPHIL NFR BLD AUTO: 3 % (ref 0–5)
ERYTHROCYTE [DISTWIDTH] IN BLOOD BY AUTOMATED COUNT: 12.9 % (ref 11.5–14.5)
GFR SERPL CREATININE-BSD FRML MDRD: 85 ML/MIN/1.73SQ M
GLUCOSE SERPL-MCNC: 95 MG/DL (ref 65–99)
HCT VFR BLD AUTO: 46.6 % (ref 42–47)
HGB BLD-MCNC: 15.7 G/DL (ref 14–18)
LYMPHOCYTES # BLD AUTO: 2.1 THOUSANDS/ΜL (ref 0.6–4.47)
LYMPHOCYTES NFR BLD AUTO: 32 % (ref 21–51)
MCH RBC QN AUTO: 31.6 PG (ref 26–34)
MCHC RBC AUTO-ENTMCNC: 33.7 G/DL (ref 31–37)
MCV RBC AUTO: 94 FL (ref 81–99)
MONOCYTES # BLD AUTO: 0.8 THOUSAND/ΜL (ref 0.17–1.22)
MONOCYTES NFR BLD AUTO: 12 % (ref 2–12)
NEUTROPHILS # BLD AUTO: 3.3 THOUSANDS/ΜL (ref 1.4–6.5)
NEUTS SEG NFR BLD AUTO: 52 % (ref 42–75)
PLATELET # BLD AUTO: 194 THOUSANDS/UL (ref 149–390)
PMV BLD AUTO: 7.5 FL (ref 8.6–11.7)
POTASSIUM SERPL-SCNC: 4.8 MMOL/L (ref 3.5–5.5)
PROT SERPL-MCNC: 7.2 G/DL (ref 6.4–8.9)
RBC # BLD AUTO: 4.98 MILLION/UL (ref 4.3–5.9)
SODIUM SERPL-SCNC: 138 MMOL/L (ref 134–143)
TROPONIN I SERPL-MCNC: <0.03 NG/ML
WBC # BLD AUTO: 6.4 THOUSAND/UL (ref 4.8–10.8)

## 2021-07-19 PROCEDURE — 93971 EXTREMITY STUDY: CPT | Performed by: SURGERY

## 2021-07-19 PROCEDURE — 71045 X-RAY EXAM CHEST 1 VIEW: CPT

## 2021-07-19 PROCEDURE — 80053 COMPREHEN METABOLIC PANEL: CPT | Performed by: EMERGENCY MEDICINE

## 2021-07-19 PROCEDURE — 84484 ASSAY OF TROPONIN QUANT: CPT | Performed by: EMERGENCY MEDICINE

## 2021-07-19 PROCEDURE — 99285 EMERGENCY DEPT VISIT HI MDM: CPT | Performed by: EMERGENCY MEDICINE

## 2021-07-19 PROCEDURE — 93971 EXTREMITY STUDY: CPT

## 2021-07-19 PROCEDURE — 99284 EMERGENCY DEPT VISIT MOD MDM: CPT

## 2021-07-19 PROCEDURE — 36415 COLL VENOUS BLD VENIPUNCTURE: CPT | Performed by: EMERGENCY MEDICINE

## 2021-07-19 PROCEDURE — 85025 COMPLETE CBC W/AUTO DIFF WBC: CPT | Performed by: EMERGENCY MEDICINE

## 2021-07-19 NOTE — DISCHARGE INSTRUCTIONS
Your ultrasound shows chronic appearing DVTs in the gastrocnemius which have flow going through them as well as a superficial thrombophlebitis that is also chronic appearing of the lesser saphenous vein  I would continue to utilize the medication as discussed by her hematologist and review the results with the hematologist for any change in treatment plan  Warm compresses to the area 4 to 6 times a day for 10-15 minutes at a time

## 2021-07-19 NOTE — ED PROVIDER NOTES
History  Chief Complaint   Patient presents with    Leg Pain     40-year-old male presents emergency room complaining of right calf pain  Patient notes he has a history of DVT and currently is on Eliquis  He recently decreased his dose in half the beginning of July in consultation with his hematologist   Patient is apparently supposed to be on Eliquis the rest of his life due to having clots 2 times in the same leg  Patient notes that he started getting more pain in his calf on the right since yesterday  He complains of mild amount of chest tightness but he believes that his anxiety due to concern about a possible DVT  No chest pain or shortness of breath is noted  Prior to Admission Medications   Prescriptions Last Dose Informant Patient Reported? Taking?    Omega-3 1000 MG CAPS  Self Yes No   Sig: Take by mouth   Red Yeast Rice 600 MG TABS  Self Yes No   Sig: Take by mouth   apixaban (Eliquis) 2 5 mg   No No   Sig: Take 1 tablet (2 5 mg total) by mouth 2 (two) times a day   levocetirizine (XYZAL) 5 MG tablet  Self No No   Sig: Take 1 tablet (5 mg total) by mouth every evening   lisinopril (ZESTRIL) 10 mg tablet  Self No No   Sig: Take 1 tablet (10 mg total) by mouth daily      Facility-Administered Medications: None       Past Medical History:   Diagnosis Date    BPH with obstruction/lower urinary tract symptoms     COVID-19 01/2021    Deep vein thrombosis (DVT) (HCC)     Erectile dysfunction due to arterial insufficiency     Hypertension     No known health problems     history of denial of any significant medical , no pertinent past medical history per allscripts    Testicular hypogonadism     Varicocele        Past Surgical History:   Procedure Laterality Date    EYE SURGERY      INGUINAL HERNIA REPAIR      UMBILICAL HERNIA REPAIR      per allscripts    VASECTOMY      VEIN LIGATION AND STRIPPING         Family History   Problem Relation Age of Onset    Heart disease Mother     Heart attack Mother     Breast cancer Mother     Hypertension Mother     Cerebral aneurysm Mother     Prostate cancer Father     Bone cancer Father     Diabetes type II Father     Testicular cancer Cousin     Coronary artery disease Maternal Grandmother     Colon cancer Paternal Grandmother     No Known Problems Son     No Known Problems Son      I have reviewed and agree with the history as documented  E-Cigarette/Vaping    E-Cigarette Use Never User      E-Cigarette/Vaping Substances    Nicotine No     THC No     CBD No     Flavoring No     Other No     Unknown No      Social History     Tobacco Use    Smoking status: Never Smoker    Smokeless tobacco: Never Used   Vaping Use    Vaping Use: Never used   Substance Use Topics    Alcohol use: Yes     Comment: rare, social, never drank alcohol per allscripts    Drug use: No       Review of Systems   Constitutional: Negative for chills and fever  HENT: Negative for ear pain and sore throat  Eyes: Negative for pain and visual disturbance  Respiratory: Positive for chest tightness  Negative for cough and shortness of breath  Cardiovascular: Negative for chest pain and palpitations  Gastrointestinal: Negative for abdominal pain and vomiting  Genitourinary: Negative for dysuria and hematuria  Musculoskeletal: Positive for myalgias  Negative for arthralgias and back pain  Skin: Negative for color change and rash  Neurological: Negative for seizures, syncope and weakness  Hematological: Negative for adenopathy  Does not bruise/bleed easily  All other systems reviewed and are negative  Physical Exam  Physical Exam  Constitutional:       General: He is not in acute distress  Appearance: Normal appearance  He is normal weight  He is not ill-appearing  HENT:      Head: Normocephalic and atraumatic        Right Ear: External ear normal       Left Ear: External ear normal       Nose: Nose normal       Mouth/Throat:      Mouth: Mucous membranes are moist    Eyes:      Conjunctiva/sclera: Conjunctivae normal    Cardiovascular:      Rate and Rhythm: Normal rate and regular rhythm  Pulses: Normal pulses  Heart sounds: Normal heart sounds  Pulmonary:      Effort: Pulmonary effort is normal       Breath sounds: Normal breath sounds  Abdominal:      General: Abdomen is flat  There is no distension  Palpations: Abdomen is soft  There is no mass  Musculoskeletal:         General: Swelling and tenderness present  No deformity  Normal range of motion  Cervical back: Normal range of motion  Right lower leg: Edema present  Left lower leg: Edema present  Comments: Trace bilateral lower extremity edema at 1/4  Patient has tenderness to the right calf in the central body of the gastrocnemius muscle  Skin:     General: Skin is warm and dry  Capillary Refill: Capillary refill takes less than 2 seconds  Coloration: Skin is not pale  Comments: No evidence of cellulitis  Neurological:      General: No focal deficit present  Mental Status: He is alert and oriented to person, place, and time  Mental status is at baseline  Motor: No weakness     Psychiatric:         Mood and Affect: Mood normal          Vital Signs  ED Triage Vitals [07/19/21 0726]   Temperature Pulse Respirations Blood Pressure SpO2   97 8 °F (36 6 °C) 84 18 143/89 100 %      Temp Source Heart Rate Source Patient Position - Orthostatic VS BP Location FiO2 (%)   Temporal Monitor Sitting Left arm --      Pain Score       --           Vitals:    07/19/21 0726 07/19/21 0920   BP: 143/89 164/74   Pulse: 84 83   Patient Position - Orthostatic VS: Sitting          Visual Acuity      ED Medications  Medications - No data to display    Diagnostic Studies  Results Reviewed     Procedure Component Value Units Date/Time    Comprehensive metabolic panel [[id number]] Collected: 07/19/21 0758    Lab Status: Final result Specimen: Blood from Arm, Left Updated: 07/19/21 0852     Sodium 138 mmol/L      Potassium 4 8 mmol/L      Chloride 104 mmol/L      CO2 28 mmol/L      ANION GAP 6 mmol/L      BUN 17 mg/dL      Creatinine 1 01 mg/dL      Glucose 95 mg/dL      Calcium 8 9 mg/dL      AST 20 U/L      ALT 20 U/L      Alkaline Phosphatase 49 U/L      Total Protein 7 2 g/dL      Albumin 4 2 g/dL      Total Bilirubin 0 50 mg/dL      eGFR 85 ml/min/1 73sq m     Narrative:      National Kid[employer] Disease Foundation guidelines for Chronic Kidney Disease (CKD):     Stage 1 with normal or high GFR (GFR > 90 mL/min/1 73 square meters)    Stage 2 Mild CKD (GFR = 60-89 mL/min/1 73 square meters)    Stage 3A Moderate CKD (GFR = 45-59 mL/min/1 73 square meters)    Stage 3B Moderate CKD (GFR = 30-44 mL/min/1 73 square meters)    Stage 4 Severe CKD (GFR = 15-29 mL/min/1 73 square meters)    Stage 5 End Stage CKD (GFR <15 mL/min/1 73 square meters)  Note: GFR calculation is accurate only with a steady state creatinine    Troponin I [[id number]]  (Normal) Collected: 07/19/21 0758    Lab Status: Final result Specimen: Blood from Arm, Left Updated: 07/19/21 0824     Troponin I <0 03 ng/mL     CBC and differential [[id number]]  (Abnormal) Collected: 07/19/21 0758    Lab Status: Final result Specimen: Blood from Arm, Left Updated: 07/19/21 0816     WBC 6 40 Thousand/uL      RBC 4 98 Million/uL      Hemoglobin 15 7 g/dL      Hematocrit 46 6 %      MCV 94 fL      MCH 31 6 pg      MCHC 33 7 g/dL      RDW 12 9 %      MPV 7 5 fL      Platelets 039 Thousands/uL      Neutrophils Relative 52 %      Lymphocytes Relative 32 %      Monocytes Relative 12 %      Eosinophils Relative 3 %      Basophils Relative 1 %      Neutrophils Absolute 3 30 Thousands/µL      Lymphocytes Absolute 2 10 Thousands/µL      Monocytes Absolute 0 80 Thousand/µL      Eosinophils Absolute 0 20 Thousand/µL      Basophils Absolute 0 10 Thousands/µL                  VAS lower limb venous duplex study, unilateral/limited   Final Result by Monica Elliott DO (07/19 1034)      XR chest 1 view portable   Final Result by Noé Estrada MD (07/19 1143)      No acute abnormality in the chest                           Workstation performed: [id number]                    Procedures  ECG 12 Lead Documentation Only    Date/Time: 7/19/2021 8:21 AM  Performed by: Naomy Yung DO  Authorized by: Naomy Yung DO     ECG reviewed by me, the ED Provider: yes    Patient location:  ED  Comments:      EKG shows a sinus rhythm at 84 per with a normal axis there is no definitive acute ST or T-wave changes appreciated  There is a sinus arrhythmia noted  ED Course  ED Course as of Jul 19 1920 Mon Jul 19, 2021   1436 Discussed with patient that he may still have a DVT, but the concern is whether not the DVT is growing or shrinking in size  Will consult the patient's hematologist after ultrasound results are returned      0848 Ultrasound reports that the patient has chronic appearing DVT in the gastrocnemius as well as a superficial thrombophlebitis in the lesser saphenous vein  These all appear chronic to her and they are cannulized  No new worsening or enlarging clots noted  MDM    Disposition  Final diagnoses:   Right leg pain     Time reflects when diagnosis was documented in both MDM as applicable and the Disposition within this note     Time User Action Codes Description Comment    7/19/2021  8:49 AM Gilmar Quesada Add [S40 967] Right leg pain       ED Disposition     ED Disposition Condition Date/Time Comment    Discharge Stable Mon Jul 19, 2021  9:08 AM Shannan Gibson discharge to home/self care  Follow-up Information     Follow up With Specialties Details Why Romeo Garnica DO Family Medicine On 7/23/2021  [address] [address]  [phone]            Discharge Medication List as of 7/19/2021 9:09 AM      CONTINUE these medications which have NOT CHANGED    Details   apixaban (Eliquis) 2 5 mg Take 1 tablet (2 5 mg total) by mouth 2 (two) times a day, Starting Thu 6/3/2021, Normal      levocetirizine (XYZAL) 5 MG tablet Take 1 tablet (5 mg total) by mouth every evening, Starting Thu 5/13/2021, Normal      lisinopril (ZESTRIL) 10 mg tablet Take 1 tablet (10 mg total) by mouth daily, Starting Tue 1/26/2021, Normal      Omega-3 1000 MG CAPS Take by mouth, Historical Med      Red Yeast Rice 600 MG TABS Take by mouth, Historical Med           No discharge procedures on file      PDMP Review     None          ED Provider  Electronically Signed by           aCrlos Enrique Shukla DO  07/19/21 1920

## 2021-08-03 ENCOUNTER — OFFICE VISIT (OUTPATIENT)
Dept: FAMILY MEDICINE CLINIC | Facility: CLINIC | Age: 51
End: 2021-08-03
Payer: COMMERCIAL

## 2021-08-03 VITALS
BODY MASS INDEX: 32.74 KG/M2 | WEIGHT: 247 LBS | TEMPERATURE: 97 F | DIASTOLIC BLOOD PRESSURE: 86 MMHG | HEIGHT: 73 IN | OXYGEN SATURATION: 97 % | HEART RATE: 80 BPM | SYSTOLIC BLOOD PRESSURE: 126 MMHG

## 2021-08-03 DIAGNOSIS — K65.4 MESENTERIC PANNICULITIS (HCC): ICD-10-CM

## 2021-08-03 DIAGNOSIS — R35.0 FREQUENCY OF URINATION: Primary | ICD-10-CM

## 2021-08-03 LAB
SL AMB  POCT GLUCOSE, UA: NORMAL
SL AMB LEUKOCYTE ESTERASE,UA: NORMAL
SL AMB POCT BILIRUBIN,UA: NORMAL
SL AMB POCT BLOOD,UA: NORMAL
SL AMB POCT CLARITY,UA: NORMAL
SL AMB POCT COLOR,UA: YELLOW
SL AMB POCT KETONES,UA: NORMAL
SL AMB POCT NITRITE,UA: NORMAL
SL AMB POCT PH,UA: 6
SL AMB POCT SPECIFIC GRAVITY,UA: 1.02
SL AMB POCT URINE PROTEIN: NORMAL
SL AMB POCT UROBILINOGEN: 0.2

## 2021-08-03 PROCEDURE — 99214 OFFICE O/P EST MOD 30 MIN: CPT | Performed by: FAMILY MEDICINE

## 2021-08-03 PROCEDURE — 81002 URINALYSIS NONAUTO W/O SCOPE: CPT | Performed by: FAMILY MEDICINE

## 2021-08-03 NOTE — PATIENT INSTRUCTIONS
Enlarged Prostate (BPH)   AMBULATORY CARE:   An enlarged prostate,  or BPH, is a common condition in men older than 40  The prostate is normally the size of a walnut  It may get larger because the number of cells increases  This kind of BPH is called benign prostatic hyperplasia  It may get larger because the cells you already have get bigger  This kind of BPH is called benign prostatic hypertrophy  Benign means it is not cancer  The prostate wraps around the urethra  An enlarged prostate will press on the urethra  This may cause problems with storing urine or emptying your bladder completely  Common signs and symptoms of BPH:   · Urinating 8 or more times each day    · A feeling of not fully emptying your bladder when you urinate    · An urgent need to urinate that you could not put off, or urinating again within 2 hours    · Being woken from sleep because you needed to urinate    · Trouble starting your urine flow, or a need to push or strain to get it to start    · Urine that stops and starts several times when you urinate    · A weak urine stream, or dribbling after you urinate    Call your doctor or urologist if:   · You see blood in your urine  · You are not able to urinate  · Your bladder feels very full and painful  · You have new or worsening symptoms  · You have a fever  · You have questions or concerns about your condition or care  Treatment  may include any of the following:  · Watchful waiting  means you do not receive treatment right away  Your signs and symptoms will be monitored over time to see if they get worse  You may be asked to keep a record of how much liquid you drink and how often you urinate  The record will include when you urinate, how easy or difficult it was, and any changes in urination  You will bring the record to follow-up visits  Your healthcare provider may also recommend ways to improve your symptoms during watchful waiting      · Medicine  may be used to relax the muscles in your prostate and bladder  This may help you urinate more easily  You may also need medicine that blocks the production of a hormone that causes the prostate to get larger  It may help to slow the growth of the prostate or shrink it  You may need medicine to reduce fluid buildup  Your healthcare provider may also make changes to your current medicines if they are making your symptoms worse  · A procedure  may be used to place a stent into your urethra to hold it open  A stent is a short, tiny mesh tube  A prostatic urethral lift, or UroLift, may be used to hold the prostate away from the urethra  This makes the urethra wider so urine can pass through more easily  · Surgery  may be used to relieve your symptoms if other treatments do not work  Extra tissue that is causing your symptoms may be destroyed  All or part of your prostate may be removed during another type of surgery  is a minimally invasive procedure to widen your urethra  The procedure is used to treat benign prostatic hyperplasia (BPH), or enlarged prostate  Prostate tissue is held away from both sides of the urethra with permanent implants  This helps make it easier for you to urinate  A prostatic urethral lift may also be called the UroLift® System  What you can do to manage BPH:   · Urinate on a regular schedule  This will train your bladder to hold urine longer  A larger amount of urine may make it easier to urinate  · Talk to your healthcare provider about all your medicines  This includes prescription and over-the-counter medicines  Some medicines can make BPH worse  Do not start any new medicines before you talk to your provider  · Drink less liquid during the day  Do not have liquid for several hours before you go to bed at night  Do not drink large amounts of any liquid at one time  · Limit alcohol and caffeine  These can irritate your bladder and make your symptoms worse  · Eat less salt    Salt can cause fluid buildup and make it harder to urinate  Examples of salty foods are chips, cured meats, and canned soups  Do not use table salt  · Elevate your legs if you have swelling  Elevate (raise) your legs above the level of your heart  This can relieve swelling caused by fluid buildup  You may not have to get up in the night to urinate  · Lose weight if you are overweight  Obesity increases your risk for obstructive sleep apnea (BOYD)  BOYD can make you need to get up in the night to urinate  Exercise can help you reach or maintain a healthy weight  A lack of exercise may make BPH symptoms worse  Aim to get at least 30 minutes of exercise on most days of the week  Follow up with your doctor or urologist as directed:  Write down your questions so you remember to ask them during your visits  © Copyright [employer] 2021 Information is for End User's use only and may not be sold, redistributed or otherwise used for commercial purposes  All illustrations and images included in CareNotes® are the copyrighted property of [employer]  or Giancarlo Quinteros  The above information is an  only  It is not intended as medical advice for individual conditions or treatments  Talk to your doctor, nurse or pharmacist before following any medical regimen to see if it is safe and effective for you

## 2021-08-03 NOTE — PROGRESS NOTES
Assessment/Plan:      Diagnoses and all orders for this visit:    Frequency of urination  Comments:  No significant back pain, urinary symptoms  Urine dip neg for UTI/glucose  May be 2/2 to irritation from increased BM vs high intake of coffee  Orders:  -     POCT urine dip    Mesenteric panniculitis ([facility] [address] )  Comments:  show on CT 1/2021  was advised to have repeat in 6-12 months  Has been ordered already pending  Patient needs to have scheduled     Advised to cut down on amount of coffee and limit PM use  Patient without other concerning PE findings or history that would prompt further evaluation  He had blood work recently with normal values  Patient advsied to return if no improvement  Return for Next scheduled follow up  The following portions of the patient's history were reviewed and updated as appropriate: allergies, current medications, past family history, past medical history, past social history, past surgical history, and problem list      Subjective:     Patient ID: Rodo Estes is a 44 y o  male  HPI     Increased urination since last night  2 cups of coffee last night  More urination than usual  First time was normal and kept getting lighter and lighter in coloring despite not drinking anything else  When he was trying to sleep he kept getting the urge to go  No dysuria  No fevers  No abdominal pain, does feel some discomfort in the suprapubic region currently  3 times today  Every 2 hours at baseline he has to void  Normally drinks 48 ounces daily of coffee  16 ounces yesterday  One at 11:30 and another after 1 PM  Drinks it cause he likes it  Drank 1, 24-32 ounce water, as well  Soda- none  Tea: none  Energy drinks: none  Had some back pain since this morning for 1 hour  Dull ache, more of a discomfort  No preceding events/injury  Has had strains in the back before  No saddle anesthesia  No BM problems, he has softer stools since being diagnosed with mesenteric panniculitis   Had 4 BM yesterday but just once this morning  Current Outpatient Medications on File Prior to Visit   Medication Sig Dispense Refill    apixaban (Eliquis) 2 5 mg Take 1 tablet (2 5 mg total) by mouth 2 (two) times a day 60 tablet 11    levocetirizine (XYZAL) 5 MG tablet Take 1 tablet (5 mg total) by mouth every evening 30 tablet 11    lisinopril (ZESTRIL) 10 mg tablet Take 1 tablet (10 mg total) by mouth daily 90 tablet 1    Omega-3 1000 MG CAPS Take by mouth      Red Yeast Rice 600 MG TABS Take by mouth      [DISCONTINUED] aspirin 325 mg tablet Take 1 tablet by mouth       No current facility-administered medications on file prior to visit  Review of Systems      Objective:     Physical Exam  Vitals and nursing note reviewed  Constitutional:       General: He is not in acute distress  Appearance: Normal appearance  He is not ill-appearing or toxic-appearing  HENT:      Head: Normocephalic and atraumatic  Nose: Nose normal       Mouth/Throat:      Mouth: Mucous membranes are moist       Pharynx: Oropharynx is clear  No oropharyngeal exudate or posterior oropharyngeal erythema  Eyes:      General: No scleral icterus  Extraocular Movements: Extraocular movements intact  Conjunctiva/sclera: Conjunctivae normal       Pupils: Pupils are equal, round, and reactive to light  Cardiovascular:      Rate and Rhythm: Normal rate and regular rhythm  Heart sounds: Normal heart sounds  No murmur heard  No friction rub  No gallop  Pulmonary:      Effort: Pulmonary effort is normal  No respiratory distress  Breath sounds: Normal breath sounds  No wheezing or rales  Abdominal:      General: Bowel sounds are normal  There is no distension  Palpations: Abdomen is soft  There is no mass  Tenderness: There is no abdominal tenderness  There is no right CVA tenderness, left CVA tenderness, guarding or rebound  Hernia: No hernia is present     Neurological: Mental Status: He is alert

## 2021-08-04 PROBLEM — K65.4 MESENTERIC PANNICULITIS (HCC): Status: ACTIVE | Noted: 2021-08-04

## 2021-08-05 ENCOUNTER — OFFICE VISIT (OUTPATIENT)
Dept: UROLOGY | Facility: AMBULATORY SURGERY CENTER | Age: 51
End: 2021-08-05
Payer: COMMERCIAL

## 2021-08-05 ENCOUNTER — TELEPHONE (OUTPATIENT)
Dept: UROLOGY | Facility: AMBULATORY SURGERY CENTER | Age: 51
End: 2021-08-05

## 2021-08-05 VITALS
DIASTOLIC BLOOD PRESSURE: 86 MMHG | WEIGHT: 247 LBS | HEART RATE: 80 BPM | HEIGHT: 73 IN | BODY MASS INDEX: 32.74 KG/M2 | SYSTOLIC BLOOD PRESSURE: 126 MMHG

## 2021-08-05 DIAGNOSIS — N13.8 BPH WITH URINARY OBSTRUCTION: Primary | ICD-10-CM

## 2021-08-05 DIAGNOSIS — N40.1 BPH WITH URINARY OBSTRUCTION: Primary | ICD-10-CM

## 2021-08-05 LAB — POST-VOID RESIDUAL VOLUME, ML POC: 50 ML

## 2021-08-05 PROCEDURE — 3079F DIAST BP 80-89 MM HG: CPT | Performed by: NURSE PRACTITIONER

## 2021-08-05 PROCEDURE — 99203 OFFICE O/P NEW LOW 30 MIN: CPT | Performed by: NURSE PRACTITIONER

## 2021-08-05 PROCEDURE — 1036F TOBACCO NON-USER: CPT | Performed by: NURSE PRACTITIONER

## 2021-08-05 PROCEDURE — 3008F BODY MASS INDEX DOCD: CPT | Performed by: NURSE PRACTITIONER

## 2021-08-05 PROCEDURE — 51798 US URINE CAPACITY MEASURE: CPT | Performed by: NURSE PRACTITIONER

## 2021-08-05 PROCEDURE — 3074F SYST BP LT 130 MM HG: CPT | Performed by: NURSE PRACTITIONER

## 2021-08-05 NOTE — TELEPHONE ENCOUNTER
Patient of Dr Jair Henriquez  Seen today by [facility] office  Patient stated he was scheduled with Dr Saritha Chand but was cancelled  Elina's note stated for Patient to be seen I 1yr or if symptoms worsen  He wants to see if he should be scheduled with Dr Saritha Chand sooner than 1 yr since he missed his visit with him  Please call Patient if need for OV  Please send postcard for reminder OV if no OV needed ntil 1 yr as [facility]

## 2021-08-06 NOTE — TELEPHONE ENCOUNTER
It looks like the AP made some suggestions for improving urinary frequency and the patient declined ERNESTINE  Perhaps I should see him back in 6 months and I can check his prostate at that time

## 2021-08-09 ENCOUNTER — APPOINTMENT (EMERGENCY)
Dept: NON INVASIVE DIAGNOSTICS | Facility: HOSPITAL | Age: 51
End: 2021-08-09
Payer: COMMERCIAL

## 2021-08-09 ENCOUNTER — HOSPITAL ENCOUNTER (EMERGENCY)
Facility: HOSPITAL | Age: 51
Discharge: HOME | End: 2021-08-09
Attending: FAMILY MEDICINE
Payer: COMMERCIAL

## 2021-08-09 VITALS
DIASTOLIC BLOOD PRESSURE: 79 MMHG | HEART RATE: 80 BPM | WEIGHT: 235 LBS | HEIGHT: 73 IN | RESPIRATION RATE: 18 BRPM | BODY MASS INDEX: 31.14 KG/M2 | SYSTOLIC BLOOD PRESSURE: 136 MMHG | TEMPERATURE: 97.3 F | OXYGEN SATURATION: 96 %

## 2021-08-09 DIAGNOSIS — M79.605 LEFT LEG PAIN: Primary | ICD-10-CM

## 2021-08-09 PROCEDURE — 99282 EMERGENCY DEPT VISIT SF MDM: CPT | Performed by: FAMILY MEDICINE

## 2021-08-09 PROCEDURE — 99283 EMERGENCY DEPT VISIT LOW MDM: CPT

## 2021-08-09 PROCEDURE — 93971 EXTREMITY STUDY: CPT | Performed by: SURGERY

## 2021-08-09 PROCEDURE — 93971 EXTREMITY STUDY: CPT

## 2021-08-09 NOTE — ED PROVIDER NOTES
History  Chief Complaint   Patient presents with    Leg Pain     "I think I have a blood clot in my leg " history of same and started with swelling and pain yesterday  Currently on Eliquis for blood clots  HPI   This is a 69-year-old male presented with complain of left lower extremity pain  Patient states he has a history of blood clot on Eliquis  States this started with the left leg pain and left foot swelling swelling is improved today so his the pain however he was concerned so came to the ED  He denies any recent travel  He does states that he has been sitting a lot of watching TV  Has been compliant with his Eliquis  Denies any chest pain or shortness of breath  Prior to Admission Medications   Prescriptions Last Dose Informant Patient Reported? Taking?    Omega-3 1000 MG CAPS  Self Yes Yes   Sig: Take by mouth   Red Yeast Rice 600 MG TABS  Self Yes Yes   Sig: Take by mouth   apixaban (Eliquis) 2 5 mg  Self No Yes   Sig: Take 1 tablet (2 5 mg total) by mouth 2 (two) times a day   levocetirizine (XYZAL) 5 MG tablet  Self No Yes   Sig: Take 1 tablet (5 mg total) by mouth every evening   lisinopril (ZESTRIL) 10 mg tablet  Self No Yes   Sig: Take 1 tablet (10 mg total) by mouth daily      Facility-Administered Medications: None       Past Medical History:   Diagnosis Date    BPH with obstruction/lower urinary tract symptoms     COVID-19 01/2021    Deep vein thrombosis (DVT) (HCC)     Erectile dysfunction due to arterial insufficiency     Hypertension     No known health problems     history of denial of any significant medical , no pertinent past medical history per allscripts    Testicular hypogonadism     Varicocele        Past Surgical History:   Procedure Laterality Date    EYE SURGERY      INGUINAL HERNIA REPAIR      UMBILICAL HERNIA REPAIR      per allscripts    VASECTOMY      VEIN LIGATION AND STRIPPING         Family History   Problem Relation Age of Onset    Heart disease Mother  Heart attack Mother     Breast cancer Mother     Hypertension Mother     Cerebral aneurysm Mother     Prostate cancer Father     Bone cancer Father     Diabetes type II Father     Testicular cancer Cousin     Coronary artery disease Maternal Grandmother     Colon cancer Paternal Grandmother     No Known Problems Son     No Known Problems Son      I have reviewed and agree with the history as documented  E-Cigarette/Vaping    E-Cigarette Use Never User      E-Cigarette/Vaping Substances    Nicotine No     THC No     CBD No     Flavoring No     Other No     Unknown No      Social History     Tobacco Use    Smoking status: Never Smoker    Smokeless tobacco: Never Used   Vaping Use    Vaping Use: Never used   Substance Use Topics    Alcohol use: Yes     Comment: rare, social, never drank alcohol per allscripts    Drug use: No       Review of Systems   Constitutional: Negative  HENT: Negative  Eyes: Negative  Respiratory: Negative  Cardiovascular: Negative  Gastrointestinal: Negative  Endocrine: Negative  Genitourinary: Negative  Musculoskeletal: Negative  Left leg pain    Skin: Negative  Allergic/Immunologic: Negative  Neurological: Negative  Psychiatric/Behavioral: Negative  Physical Exam  Physical Exam  Vitals and nursing note reviewed  Constitutional:       Appearance: Normal appearance  HENT:      Head: Normocephalic and atraumatic  Nose: Nose normal    Eyes:      Extraocular Movements: Extraocular movements intact  Conjunctiva/sclera: Conjunctivae normal       Pupils: Pupils are equal, round, and reactive to light  Cardiovascular:      Rate and Rhythm: Normal rate and regular rhythm  Pulmonary:      Effort: Pulmonary effort is normal       Breath sounds: Normal breath sounds  Abdominal:      General: Bowel sounds are normal       Palpations: Abdomen is soft  Tenderness: There is no abdominal tenderness  Musculoskeletal:         General: Tenderness (mild tenderness to palpation no swelling noted ) present  No swelling  Cervical back: Normal range of motion and neck supple  Skin:     General: Skin is warm  Neurological:      General: No focal deficit present  Mental Status: He is alert and oriented to person, place, and time  Psychiatric:         Mood and Affect: Mood normal          Behavior: Behavior normal          Vital Signs  ED Triage Vitals [08/09/21 0833]   Temperature Pulse Respirations Blood Pressure SpO2   (!) 97 3 °F (36 3 °C) 80 18 136/79 96 %      Temp Source Heart Rate Source Patient Position - Orthostatic VS BP Location FiO2 (%)   Tympanic Monitor Sitting Left arm --      Pain Score       1           Vitals:    08/09/21 0833   BP: 136/79   Pulse: 80   Patient Position - Orthostatic VS: Sitting         Visual Acuity      ED Medications  Medications - No data to display    Diagnostic Studies  Results Reviewed     None                 VAS lower limb venous duplex study, unilateral/limited    (Results Pending)              Procedures  Procedures         ED Course      venous duplex negative for DVT recommended continue taking Eliquis follow-up with hematology  Return precautions provided                        SBIRT 22yo+      Most Recent Value   SBIRT (22 yo +)   In order to provide better care to our patients, we are screening all of our patients for alcohol and drug use  Would it be okay to ask you these screening questions? Yes Filed at: 08/09/2021 5955   Initial Alcohol Screen: US AUDIT-C    1  How often do you have a drink containing alcohol?  0 Filed at: 08/09/2021 0837   2  How many drinks containing alcohol do you have on a typical day you are drinking? 0 Filed at: 08/09/2021 0837   3a  Male UNDER 65: How often do you have five or more drinks on one occasion? 0 Filed at: 08/09/2021 0837   3b  FEMALE Any Age, or MALE 65+:  How often do you have 4 or more drinks on one occassion? 0 Filed at: 08/09/2021 0837   Audit-C Score  0 Filed at: 08/09/2021 8945   ELVIRA: How many times in the past year have you    Used an illegal drug or used a prescription medication for non-medical reasons? Never Filed at: 08/09/2021 1181                    MDM    Disposition  Final diagnoses:   Left leg pain     Time reflects when diagnosis was documented in both MDM as applicable and the Disposition within this note     Time User Action Codes Description Comment    8/9/2021 10:11 AM Neda Lozada [M79 605] Left leg pain       ED Disposition     ED Disposition Condition Date/Time Comment    Discharge Stable Mon Aug 9, 2021 10:11 AM Dom Gibson discharge to home/self care  Follow-up Information     Follow up With Specialties Details Why Contact Info    Racheal Castleman,  Family Medicine Schedule an appointment as soon as possible for a visit in 2 days If symptoms worsen [address] [id number] [phone]            Patient's Medications   Discharge Prescriptions    No medications on file     No discharge procedures on file      PDMP Review     None          ED Provider  Electronically Signed by           Jaime Fregsoo MD  08/09/21 3519

## 2021-08-09 NOTE — TELEPHONE ENCOUNTER
Left message for pt to call back to schedule appt/ERNESTINE with dr Genoveva Amaro in February 2022

## 2021-08-26 ENCOUNTER — HOSPITAL ENCOUNTER (OUTPATIENT)
Dept: CT IMAGING | Facility: HOSPITAL | Age: 51
Discharge: HOME | End: 2021-08-26
Attending: PHYSICIAN ASSISTANT
Payer: COMMERCIAL

## 2021-08-26 DIAGNOSIS — R10.33 PERIUMBILICAL PAIN: ICD-10-CM

## 2021-08-26 DIAGNOSIS — Z86.010 PERSONAL HISTORY OF COLONIC POLYPS: ICD-10-CM

## 2021-08-26 DIAGNOSIS — R93.3 ABNORMAL FINDINGS ON DIAGNOSTIC IMAGING OF OTHER PARTS OF DIGESTIVE TRACT: ICD-10-CM

## 2021-08-26 PROCEDURE — 74176 CT ABD & PELVIS W/O CONTRAST: CPT

## 2021-09-14 NOTE — PROGRESS NOTES
[facility]        NAME: Sheryle Courier is a 46 y o  male  : 1970    MRN: [id number]  DATE: 2018  TIME: 8:05 AM    Assessment and Plan   Acute non-recurrent frontal sinusitis [J01 10]  1  Acute non-recurrent frontal sinusitis  doxycycline (ADOXA) 100 MG tablet   2  Left otitis media, unspecified otitis media type  doxycycline (ADOXA) 100 MG tablet         Patient Instructions     May alternate Tylenol and Ibuprofen as needed  Encourage fluids and rest    Saline nasal spray as needed  Humidify bedroom  Salt water gargles  Chloraseptic spray and lozenges as needed  Consider adding anti-histamines daily, ie  Claritin or Zyrtec  Complete course of antibiotics as directed  Follow up with PCP in 5-7 days  Proceed to  ER if symptoms worsen  Chief Complaint     Chief Complaint   Patient presents with   Cari Hairston     Patient here with an left ear pain for 1 week  He has been using ear drops with no relief  History of Present Illness       Patient presents with L ear pain x 1 week  Reports was seen by PCP and placed on ciprodex drop  Also with frontal sinus pressure over L eye, as well and sore throat  Denies fevers, N/V + chills  + diarrhea one episode yesterday with no blood  No history of Asthma  Nonsmoker  + seasonal allergies, takes claritin prn  No flu vaccine this season  + sudafed last evening with no relief  Review of Systems   Review of Systems   Constitutional: Positive for chills  Negative for fever  HENT: Positive for congestion, ear pain, postnasal drip, sinus pain and sinus pressure  Negative for sore throat and trouble swallowing  Respiratory: Positive for cough  Negative for chest tightness, shortness of breath and wheezing  Cardiovascular: Negative  Gastrointestinal: Positive for diarrhea  Negative for abdominal pain, nausea and vomiting  Musculoskeletal: Negative for myalgias  Skin: Negative for rash           Current Medications       Current Outpatient Prescriptions:     aspirin 325 mg tablet, Take 1 tablet by mouth, Disp: , Rfl:     ciprofloxacin-dexamethasone (CIPRODEX) otic suspension, Administer 4 drops into the left ear 2 (two) times a day, Disp: 7 5 mL, Rfl: 1    furosemide (LASIX) 20 mg tablet, Take 1 tablet (20 mg total) by mouth daily, Disp: 7 tablet, Rfl: 1    ibuprofen (MOTRIN) 800 mg tablet, Take 1 tablet by mouth every 8 (eight) hours, Disp: , Rfl:     Omega-3 1000 MG CAPS, Take by mouth, Disp: , Rfl:     Red Yeast Rice 600 MG CAPS, Take by mouth, Disp: , Rfl:     triamcinolone (KENALOG) 0 5 % cream, Apply topically 2 (two) times a day, Disp: 30 g, Rfl: 1    doxycycline (ADOXA) 100 MG tablet, Take 1 tablet (100 mg total) by mouth 2 (two) times a day for 10 days, Disp: 20 tablet, Rfl: 0    Current Allergies     Allergies as of 12/24/2018 - Reviewed 12/24/2018   Allergen Reaction Noted    Iodine Other (See Comments) 12/30/2016            The following portions of the patient's history were reviewed and updated as appropriate: allergies, current medications, past family history, past medical history, past social history, past surgical history and problem list      Past Medical History:   Diagnosis Date    BPH with obstruction/lower urinary tract symptoms     Deep vein thrombosis (DVT) ([facility] [address] )     Erectile dysfunction due to arterial insufficiency     No known health problems     history of denial of any significant medical , no pertinent past medical history per allscripts    Testicular hypogonadism     Varicocele        Past Surgical History:   Procedure Laterality Date    EYE SURGERY      INGUINAL HERNIA REPAIR      UMBILICAL HERNIA REPAIR      per allscripts    VASECTOMY      VEIN LIGATION AND STRIPPING         Family History   Problem Relation Age of Onset    Heart disease Mother     Heart attack Mother     Breast cancer Mother     Hypertension Mother     Cerebral aneurysm Mother    Donna Alcala Prostate cancer Father     Bone cancer Father     Diabetes type II Father     Testicular cancer Cousin     Coronary artery disease Maternal Grandmother     Colon cancer Paternal Grandmother          Medications have been verified  Objective   /68   Pulse 81   Temp 97 7 °F (36 5 °C) (Tympanic)   Resp 18   Ht 6' 1" (1 854 m)   Wt 115 kg (253 lb)   SpO2 98%   BMI 33 38 kg/m²        Physical Exam     Physical Exam   Constitutional: He is oriented to person, place, and time  Vital signs are normal  He appears well-developed and well-nourished  He is cooperative  Non-toxic appearance  He does not have a sickly appearance  He appears ill  No distress  HENT:   Head: Normocephalic and atraumatic  Right Ear: Hearing, tympanic membrane, external ear and ear canal normal    Left Ear: Hearing and external ear normal  No swelling  Tympanic membrane is erythematous and bulging  Nose: Mucosal edema, rhinorrhea and sinus tenderness present  Right sinus exhibits frontal sinus tenderness  Right sinus exhibits no maxillary sinus tenderness  Left sinus exhibits frontal sinus tenderness  Left sinus exhibits no maxillary sinus tenderness  Mouth/Throat: Uvula is midline and mucous membranes are normal  Normal dentition  Posterior oropharyngeal erythema present  No oropharyngeal exudate  Eyes: Pupils are equal, round, and reactive to light  Conjunctivae, EOM and lids are normal  Right eye exhibits no discharge  Left eye exhibits no discharge  Neck: Trachea normal and normal range of motion  No thyroid mass and no thyromegaly present  Cardiovascular: Normal rate, regular rhythm, S1 normal, S2 normal, normal heart sounds, intact distal pulses and normal pulses  Exam reveals no gallop and no friction rub  No murmur heard  Pulmonary/Chest: Effort normal and breath sounds normal  No respiratory distress  He has no wheezes  He has no rhonchi  He has no rales  He exhibits no tenderness  Musculoskeletal: Normal range of motion  He exhibits no edema  Lymphadenopathy:     He has no cervical adenopathy  Neurological: He is alert and oriented to person, place, and time  Skin: Skin is warm and dry  No rash noted  He is not diaphoretic  Psychiatric: He has a normal mood and affect  His behavior is normal  Thought content normal    Nursing note and vitals reviewed  no

## 2021-10-18 ENCOUNTER — OFFICE VISIT (OUTPATIENT)
Dept: FAMILY MEDICINE CLINIC | Facility: CLINIC | Age: 51
End: 2021-10-18
Payer: COMMERCIAL

## 2021-10-18 VITALS
HEART RATE: 64 BPM | TEMPERATURE: 96.6 F | DIASTOLIC BLOOD PRESSURE: 86 MMHG | HEIGHT: 73 IN | BODY MASS INDEX: 32.93 KG/M2 | WEIGHT: 248.5 LBS | SYSTOLIC BLOOD PRESSURE: 138 MMHG | OXYGEN SATURATION: 100 %

## 2021-10-18 DIAGNOSIS — I10 BENIGN ESSENTIAL HYPERTENSION: ICD-10-CM

## 2021-10-18 DIAGNOSIS — J45.909 ASTHMA DUE TO ENVIRONMENTAL ALLERGIES: ICD-10-CM

## 2021-10-18 DIAGNOSIS — Z12.5 SCREENING FOR PROSTATE CANCER: ICD-10-CM

## 2021-10-18 DIAGNOSIS — Z23 ENCOUNTER FOR IMMUNIZATION: ICD-10-CM

## 2021-10-18 DIAGNOSIS — J30.1 SEASONAL ALLERGIC RHINITIS DUE TO POLLEN: ICD-10-CM

## 2021-10-18 DIAGNOSIS — Z11.59 NEED FOR HEPATITIS C SCREENING TEST: ICD-10-CM

## 2021-10-18 DIAGNOSIS — I10 BENIGN ESSENTIAL HYPERTENSION: Primary | ICD-10-CM

## 2021-10-18 PROCEDURE — 3079F DIAST BP 80-89 MM HG: CPT | Performed by: FAMILY MEDICINE

## 2021-10-18 PROCEDURE — 1036F TOBACCO NON-USER: CPT | Performed by: FAMILY MEDICINE

## 2021-10-18 PROCEDURE — 3008F BODY MASS INDEX DOCD: CPT | Performed by: FAMILY MEDICINE

## 2021-10-18 PROCEDURE — 99214 OFFICE O/P EST MOD 30 MIN: CPT | Performed by: FAMILY MEDICINE

## 2021-10-18 PROCEDURE — 3075F SYST BP GE 130 - 139MM HG: CPT | Performed by: FAMILY MEDICINE

## 2021-10-18 RX ORDER — PREDNISONE 10 MG/1
TABLET ORAL
Qty: 30 TABLET | Refills: 0 | Status: SHIPPED | OUTPATIENT
Start: 2021-10-18 | End: 2022-01-27 | Stop reason: ALTCHOICE

## 2021-10-18 RX ORDER — LISINOPRIL 10 MG/1
10 TABLET ORAL DAILY
Qty: 90 TABLET | Refills: 3 | Status: SHIPPED | OUTPATIENT
Start: 2021-10-18

## 2021-12-02 ENCOUNTER — OFFICE VISIT (OUTPATIENT)
Dept: FAMILY MEDICINE CLINIC | Facility: CLINIC | Age: 51
End: 2021-12-02
Payer: COMMERCIAL

## 2021-12-02 VITALS
RESPIRATION RATE: 18 BRPM | BODY MASS INDEX: 33.13 KG/M2 | SYSTOLIC BLOOD PRESSURE: 112 MMHG | HEIGHT: 73 IN | WEIGHT: 250 LBS | HEART RATE: 87 BPM | DIASTOLIC BLOOD PRESSURE: 70 MMHG | OXYGEN SATURATION: 98 % | TEMPERATURE: 96.4 F

## 2021-12-02 DIAGNOSIS — R43.0 LOSS OF SMELL: ICD-10-CM

## 2021-12-02 DIAGNOSIS — R43.2 POST-COVID CHRONIC LOSS OF TASTE: ICD-10-CM

## 2021-12-02 DIAGNOSIS — Z23 ENCOUNTER FOR IMMUNIZATION: ICD-10-CM

## 2021-12-02 DIAGNOSIS — Z86.718 HISTORY OF DVT (DEEP VEIN THROMBOSIS): Primary | ICD-10-CM

## 2021-12-02 DIAGNOSIS — U09.9 POST-COVID CHRONIC LOSS OF TASTE: ICD-10-CM

## 2021-12-02 PROCEDURE — 99213 OFFICE O/P EST LOW 20 MIN: CPT | Performed by: FAMILY MEDICINE

## 2021-12-02 RX ORDER — IVERMECTIN 3 MG/1
200 TABLET ORAL ONCE
Qty: 8 TABLET | Refills: 0 | Status: SHIPPED | OUTPATIENT
Start: 2021-12-02 | End: 2021-12-02

## 2021-12-13 ENCOUNTER — RA CDI HCC (OUTPATIENT)
Dept: OTHER | Facility: HOSPITAL | Age: 51
End: 2021-12-13

## 2021-12-17 ENCOUNTER — HOSPITAL ENCOUNTER (EMERGENCY)
Facility: HOSPITAL | Age: 51
Discharge: HOME | End: 2021-12-17
Attending: FAMILY MEDICINE | Admitting: FAMILY MEDICINE
Payer: COMMERCIAL

## 2021-12-17 ENCOUNTER — APPOINTMENT (EMERGENCY)
Dept: NON INVASIVE DIAGNOSTICS | Facility: HOSPITAL | Age: 51
End: 2021-12-17
Payer: COMMERCIAL

## 2021-12-17 ENCOUNTER — OFFICE VISIT (OUTPATIENT)
Dept: FAMILY MEDICINE CLINIC | Facility: CLINIC | Age: 51
End: 2021-12-17
Payer: COMMERCIAL

## 2021-12-17 VITALS
OXYGEN SATURATION: 99 % | TEMPERATURE: 98 F | SYSTOLIC BLOOD PRESSURE: 150 MMHG | WEIGHT: 250 LBS | BODY MASS INDEX: 32.98 KG/M2 | HEART RATE: 58 BPM | RESPIRATION RATE: 16 BRPM | DIASTOLIC BLOOD PRESSURE: 80 MMHG

## 2021-12-17 VITALS
SYSTOLIC BLOOD PRESSURE: 128 MMHG | OXYGEN SATURATION: 98 % | DIASTOLIC BLOOD PRESSURE: 72 MMHG | HEART RATE: 86 BPM | TEMPERATURE: 97.2 F | BODY MASS INDEX: 33.46 KG/M2 | HEIGHT: 73 IN | WEIGHT: 252.5 LBS

## 2021-12-17 DIAGNOSIS — R43.2 POST-COVID CHRONIC LOSS OF TASTE: Primary | ICD-10-CM

## 2021-12-17 DIAGNOSIS — Z86.718 HISTORY OF DVT (DEEP VEIN THROMBOSIS): ICD-10-CM

## 2021-12-17 DIAGNOSIS — R43.0 LOSS OF SMELL: ICD-10-CM

## 2021-12-17 DIAGNOSIS — M79.605 LEFT LEG PAIN: Primary | ICD-10-CM

## 2021-12-17 DIAGNOSIS — U09.9 POST-COVID CHRONIC LOSS OF TASTE: Primary | ICD-10-CM

## 2021-12-17 PROCEDURE — 99213 OFFICE O/P EST LOW 20 MIN: CPT | Performed by: FAMILY MEDICINE

## 2021-12-17 PROCEDURE — 93971 EXTREMITY STUDY: CPT | Performed by: SURGERY

## 2021-12-17 PROCEDURE — 99283 EMERGENCY DEPT VISIT LOW MDM: CPT

## 2021-12-17 PROCEDURE — 3078F DIAST BP <80 MM HG: CPT | Performed by: FAMILY MEDICINE

## 2021-12-17 PROCEDURE — 3008F BODY MASS INDEX DOCD: CPT | Performed by: FAMILY MEDICINE

## 2021-12-17 PROCEDURE — 93971 EXTREMITY STUDY: CPT

## 2021-12-17 PROCEDURE — 99284 EMERGENCY DEPT VISIT MOD MDM: CPT | Performed by: PHYSICIAN ASSISTANT

## 2021-12-17 PROCEDURE — 3074F SYST BP LT 130 MM HG: CPT | Performed by: FAMILY MEDICINE

## 2021-12-17 PROCEDURE — 1036F TOBACCO NON-USER: CPT | Performed by: FAMILY MEDICINE

## 2021-12-17 PROCEDURE — 3725F SCREEN DEPRESSION PERFORMED: CPT | Performed by: FAMILY MEDICINE

## 2021-12-17 RX ORDER — IVERMECTIN 3 MG/1
200 TABLET ORAL ONCE
Qty: 8 TABLET | Refills: 0 | Status: SHIPPED | OUTPATIENT
Start: 2021-12-17 | End: 2021-12-17

## 2022-01-03 ENCOUNTER — OFFICE VISIT (OUTPATIENT)
Dept: URGENT CARE | Facility: CLINIC | Age: 52
End: 2022-01-03
Payer: COMMERCIAL

## 2022-01-03 VITALS — HEART RATE: 114 BPM | TEMPERATURE: 97.5 F | RESPIRATION RATE: 16 BRPM | OXYGEN SATURATION: 98 %

## 2022-01-03 DIAGNOSIS — J06.9 ACUTE URI: Primary | ICD-10-CM

## 2022-01-03 PROCEDURE — G0382 LEV 3 HOSP TYPE B ED VISIT: HCPCS

## 2022-01-03 PROCEDURE — 87636 SARSCOV2 & INF A&B AMP PRB: CPT

## 2022-01-03 NOTE — LETTER
January 3, 2022     Patient: Kostas Saldana   YOB: 1970   Date of Visit: 1/3/2022       To Whom It May Concern: It is my medical opinion that Micaela De La Rosa is out of work until test results are back  If you have any questions or concerns, please don't hesitate to call           Sincerely,        [employer]    CC: No Recipients

## 2022-01-03 NOTE — PROGRESS NOTES
[facility]        NAME: Sheryle Courier is a 46 y o  male  : 1970    MRN: [id number]  DATE: January 3, 2022  TIME: 8:52 AM    Assessment and Plan   Acute URI [J06 9]  1  Acute URI  COVID/FLU- Office Collect         Patient Instructions     You are currently being tested for COVID-19  The test results take approximately 24-48 hours to return  Please quarantine until these test results are back  The results are available immediately via [address]  I will call you with any positive results and if the results are unseen  Try to self isolate in the home, may wear a mask at home  The most accurate result is 24-48 hours after the onset of symptoms  If the test result is negative and you have tested prior to this time , it may be too early and retesting may need to occur if your symptoms persist     Clean high touch surfaces after use including the bathroom  Practice proper cough etiquette and good hand hygiene  Dispose of used tissues immediately  May use an over-the-counter saline nasal spray, Mucinex as directed on the bottles for congestion as needed  May continue taking over-the-counter Tylenol and ibuprofen as directed on the bottle as needed for fever body aches  Ensure you are drinking plenty of fluids  May use a cool mist or warm mist humidifier to help thin out secretions  Honey is a natural cough suppressant and will help soothe the sore throat  Honey should not be given to pregnant women or children under the age of 3years old  May also use over-the-counter cough drops or Cepacol as needed for sore throat  Follow with your PCP in 3-5 days  If symptoms worsen proceed to the ED  Chief Complaint     Chief Complaint   Patient presents with    Illness     low grade temp 100 x 2 days  Started 3 days ago with phlegm  Glands feel swollen, pressure in ears, headaches, some pressure behind eyes  No covid or flu vaccines  Denies exposure to anyone ill            History of Present Illness       Patient is a 33-year-old male who presents to the office today complaining of nasal congestion, postnasal drip, sore throat, decreased appetite, low-grade fever T-max of a 100 4°, chills, nausea, and headaches  He states that his symptoms started while at work new year's [place] into new year's Day  He does have a history of COVID last January and states that he had lost his small from this which has only partially returned  He is not COVID vaccinated nor vaccinated for influenza  He denies any known sick contacts  Review of Systems   Review of Systems   Constitutional: Positive for appetite change, chills and fever  HENT: Positive for congestion, postnasal drip, rhinorrhea and sore throat  Respiratory: Positive for cough  Negative for shortness of breath and wheezing  Cardiovascular: Negative for chest pain and palpitations  Gastrointestinal: Positive for nausea  Negative for diarrhea and vomiting  Neurological: Positive for headaches  All other systems reviewed and are negative          Current Medications       Current Outpatient Medications:     levocetirizine (XYZAL) 5 MG tablet, Take 1 tablet (5 mg total) by mouth every evening, Disp: 30 tablet, Rfl: 11    lisinopril (ZESTRIL) 10 mg tablet, Take 1 tablet (10 mg total) by mouth daily, Disp: 90 tablet, Rfl: 3    Omega-3 1000 MG CAPS, Take by mouth, Disp: , Rfl:     predniSONE 10 mg tablet, 4 pills for 3 days, then 3 pills for 3 days, then 2 pills for 3 days, then 1 pills for 3 days, then stop, Disp: 30 tablet, Rfl: 0    Red Yeast Rice 600 MG TABS, Take by mouth, Disp: , Rfl:     rivaroxaban (Xarelto) 20 mg tablet, Take 1 tablet (20 mg total) by mouth daily with breakfast, Disp: 90 tablet, Rfl: 3    Current Allergies     Allergies as of 01/03/2022 - Reviewed 01/03/2022   Allergen Reaction Noted    Penicillins  05/30/2019    Iodine - food allergy Other (See Comments) 12/30/2016            The following portions of the patient's history were reviewed and updated as appropriate: allergies, current medications, past family history, past medical history, past social history, past surgical history and problem list      Past Medical History:   Diagnosis Date    BPH with obstruction/lower urinary tract symptoms     COVID-19 01/2021    Deep vein thrombosis (DVT) ([address] )     Erectile dysfunction due to arterial insufficiency     Hypertension     No known health problems     history of denial of any significant medical , no pertinent past medical history per allscripts    Testicular hypogonadism     Varicocele        Past Surgical History:   Procedure Laterality Date    EYE SURGERY      INGUINAL HERNIA REPAIR      UMBILICAL HERNIA REPAIR      per allscripts    VASECTOMY      VEIN LIGATION AND STRIPPING         Family History   Problem Relation Age of Onset    Heart disease Mother     Heart attack Mother     Breast cancer Mother     Hypertension Mother     Cerebral aneurysm Mother     Prostate cancer Father     Bone cancer Father     Diabetes type II Father     Testicular cancer Cousin     Coronary artery disease Maternal Grandmother     Colon cancer Paternal Grandmother     No Known Problems Son     No Known Problems Son          Medications have been verified  Objective   Pulse (!) 114   Temp 97 5 °F (36 4 °C)   Resp 16   SpO2 98%        Physical Exam     Physical Exam  Vitals and nursing note reviewed  Constitutional:       General: He is not in acute distress  Appearance: Normal appearance  He is normal weight  He is not ill-appearing, toxic-appearing or diaphoretic  HENT:      Right Ear: Tympanic membrane normal       Left Ear: Tympanic membrane normal       Nose: Congestion and rhinorrhea present  Right Turbinates: Enlarged  Left Turbinates: Enlarged  Right Sinus: No maxillary sinus tenderness or frontal sinus tenderness        Left Sinus: No maxillary sinus tenderness or frontal sinus tenderness  Mouth/Throat:      Mouth: Mucous membranes are moist       Comments: Postnasal drip and cobblestoning noted  Cardiovascular:      Rate and Rhythm: Regular rhythm  Tachycardia present  Pulses: Normal pulses  Heart sounds: Normal heart sounds  No murmur heard  No gallop  Pulmonary:      Effort: Pulmonary effort is normal  No respiratory distress  Breath sounds: Normal breath sounds  No wheezing  Lymphadenopathy:      Cervical: Cervical adenopathy present  Skin:     General: Skin is warm and dry  Neurological:      Mental Status: He is alert  Psychiatric:         Mood and Affect: Mood normal          Behavior: Behavior normal          Thought Content:  Thought content normal

## 2022-01-03 NOTE — PATIENT INSTRUCTIONS
You are currently being tested for COVID-19  The test results take approximately 24-48 hours to return  Please quarantine until these test results are back  The results are available immediately via [address]  I will call you with any positive results and if the results are unseen  Try to self isolate in the home, may wear a mask at home  The most accurate result is 24-48 hours after the onset of symptoms  If the test result is negative and you have tested prior to this time , it may be too early and retesting may need to occur if your symptoms persist     Clean high touch surfaces after use including the bathroom  Practice proper cough etiquette and good hand hygiene  Dispose of used tissues immediately  May use an over-the-counter saline nasal spray, Mucinex as directed on the bottles for congestion as needed  May continue taking over-the-counter Tylenol as directed on the bottle as needed for fever body aches  Ensure you are drinking plenty of fluids  May use a cool mist or warm mist humidifier to help thin out secretions  Honey is a natural cough suppressant and will help soothe the sore throat  Honey should not be given to pregnant women or children under the age of 3years old  May also use over-the-counter cough drops or Cepacol as needed for sore throat  Follow with your PCP in 3-5 days  If symptoms worsen proceed to the ED  New quarantine guidelines started 12/29/2021  The guidelines state that you must quarantine for 5 days from the onset of symptoms with positive test   On day 6 you may return to work as long as you wear a mask for the next 5 days and take breaks alone with a closed door  This quarantine guideline is only if your symptoms are getting better and you have no fever for 24 hours on day 5 without the use of Tylenol or Motrin  Please follow with your family doctor for any questions       Individuals should be fever-free for 24 hours without medication and improving before ending isolation   Highly immunosuppressed patients should remain on isolation for ? 10 days due to risk of prolonged viral shedding

## 2022-01-07 LAB
FLUAV RNA RESP QL NAA+PROBE: NEGATIVE
FLUBV RNA RESP QL NAA+PROBE: NEGATIVE
SARS-COV-2 RNA RESP QL NAA+PROBE: POSITIVE

## 2022-01-08 ENCOUNTER — TELEPHONE (OUTPATIENT)
Dept: URGENT CARE | Facility: CLINIC | Age: 52
End: 2022-01-08

## 2022-01-08 NOTE — TELEPHONE ENCOUNTER
LM for patient regarding test results  Results were viewed in [employer]  Comment/message left for patient in [email or web address]  Recommend follow up with pcp  Quarantine guidelines given and instructions for worsening symptoms  If they have any questions, can call us at [phone]

## 2022-01-27 ENCOUNTER — OFFICE VISIT (OUTPATIENT)
Dept: FAMILY MEDICINE CLINIC | Facility: CLINIC | Age: 52
End: 2022-01-27
Payer: COMMERCIAL

## 2022-01-27 VITALS
SYSTOLIC BLOOD PRESSURE: 126 MMHG | HEART RATE: 114 BPM | HEIGHT: 73 IN | OXYGEN SATURATION: 98 % | TEMPERATURE: 97.6 F | DIASTOLIC BLOOD PRESSURE: 70 MMHG | WEIGHT: 257.6 LBS | BODY MASS INDEX: 34.14 KG/M2

## 2022-01-27 DIAGNOSIS — R43.0 LOSS OF SMELL: ICD-10-CM

## 2022-01-27 DIAGNOSIS — R43.2 POST-COVID CHRONIC LOSS OF TASTE: Primary | ICD-10-CM

## 2022-01-27 DIAGNOSIS — U09.9 POST-COVID CHRONIC LOSS OF TASTE: Primary | ICD-10-CM

## 2022-01-27 PROCEDURE — 3078F DIAST BP <80 MM HG: CPT | Performed by: FAMILY MEDICINE

## 2022-01-27 PROCEDURE — 1036F TOBACCO NON-USER: CPT | Performed by: FAMILY MEDICINE

## 2022-01-27 PROCEDURE — 99213 OFFICE O/P EST LOW 20 MIN: CPT | Performed by: FAMILY MEDICINE

## 2022-01-27 PROCEDURE — 3008F BODY MASS INDEX DOCD: CPT | Performed by: FAMILY MEDICINE

## 2022-01-27 PROCEDURE — 3074F SYST BP LT 130 MM HG: CPT | Performed by: FAMILY MEDICINE

## 2022-01-27 PROCEDURE — 3725F SCREEN DEPRESSION PERFORMED: CPT | Performed by: FAMILY MEDICINE

## 2022-01-27 RX ORDER — PREDNISONE 10 MG/1
TABLET ORAL
Qty: 30 TABLET | Refills: 0 | Status: SHIPPED | OUTPATIENT
Start: 2022-01-27

## 2022-01-27 RX ORDER — IVERMECTIN 3 MG/1
200 TABLET ORAL ONCE
Qty: 8 TABLET | Refills: 1 | Status: SHIPPED | OUTPATIENT
Start: 2022-01-27 | End: 2022-01-27

## 2022-01-27 NOTE — PROGRESS NOTES
Assessment/Plan:    No problem-specific Assessment & Plan notes found for this encounter  Diagnoses and all orders for this visit:    Post-COVID chronic loss of taste  -     ivermectin (STROMECTOL) 3 MG TABS; Take 8 tablets (24,000 mcg total) by mouth once for 1 dose  -     predniSONE 10 mg tablet; 4 pills for 3 days, then 3 pills for 3 days, then 2 pills for 3 days, then 1 pills for 3 days, then stop    Loss of smell  -     ivermectin (STROMECTOL) 3 MG TABS; Take 8 tablets (24,000 mcg total) by mouth once for 1 dose  -     predniSONE 10 mg tablet; 4 pills for 3 days, then 3 pills for 3 days, then 2 pills for 3 days, then 1 pills for 3 days, then stop          PHQ-2/9 Depression Screening    Little interest or pleasure in doing things: 0 - not at all  Feeling down, depressed, or hopeless: 0 - not at all  PHQ-2 Score: 0  PHQ-2 Interpretation: Negative depression screen            Subjective:      Patient ID: Jah Dean is a 46 y o  male  Follow up for loss of taste and smell from Covid infection, pt took 1 dose of Ivermectin and regained 60% of his taste and smell, a second dose was given and was regaining hagan taste and smell but then was reinfected with Covid which set him back, pt is recovered from the infection, pt states he can smell "bad" smells but not "good" smells      The following portions of the patient's history were reviewed and updated as appropriate: allergies, current medications, past family history, past medical history, past social history, past surgical history and problem list     Review of Systems   Constitutional: Negative for chills and fever  Respiratory: Negative for shortness of breath and wheezing  Objective:    /70 (BP Location: Left arm, Patient Position: Sitting)   Pulse (!) 114   Temp 97 6 °F (36 4 °C) (Tympanic)   Ht 6' 1" (1 854 m)   Wt 117 kg (257 lb 9 6 oz)   SpO2 98%   BMI 33 99 kg/m²      Physical Exam  Vitals and nursing note reviewed  Constitutional:       General: He is not in acute distress  Appearance: Normal appearance  He is not ill-appearing, toxic-appearing or diaphoretic  HENT:      Head: Normocephalic and atraumatic  Right Ear: Tympanic membrane, ear canal and external ear normal  There is no impacted cerumen  Left Ear: Tympanic membrane, ear canal and external ear normal       Nose: Nose normal  No congestion or rhinorrhea  Mouth/Throat:      Mouth: Mucous membranes are moist       Pharynx: No oropharyngeal exudate or posterior oropharyngeal erythema  Eyes:      Conjunctiva/sclera: Conjunctivae normal    Cardiovascular:      Rate and Rhythm: Normal rate and regular rhythm  Heart sounds: Normal heart sounds  No murmur heard  Pulmonary:      Effort: Pulmonary effort is normal  No respiratory distress  Breath sounds: Normal breath sounds  No wheezing, rhonchi or rales  Abdominal:      Palpations: Abdomen is soft  Musculoskeletal:      Cervical back: Normal range of motion and neck supple  No rigidity  Right lower leg: No edema  Left lower leg: No edema  Lymphadenopathy:      Cervical: No cervical adenopathy  Neurological:      Mental Status: He is alert and oriented to person, place, and time  Psychiatric:         Mood and Affect: Mood normal          Behavior: Behavior normal          Thought Content:  Thought content normal          Judgment: Judgment normal

## 2022-03-17 ENCOUNTER — OFFICE VISIT (OUTPATIENT)
Dept: URGENT CARE | Facility: CLINIC | Age: 52
End: 2022-03-17
Payer: COMMERCIAL

## 2022-03-17 VITALS
SYSTOLIC BLOOD PRESSURE: 130 MMHG | RESPIRATION RATE: 18 BRPM | HEART RATE: 94 BPM | DIASTOLIC BLOOD PRESSURE: 79 MMHG | TEMPERATURE: 98.2 F | OXYGEN SATURATION: 97 %

## 2022-03-17 DIAGNOSIS — J03.90 ACUTE TONSILLITIS, UNSPECIFIED ETIOLOGY: Primary | ICD-10-CM

## 2022-03-17 LAB — S PYO AG THROAT QL: NEGATIVE

## 2022-03-17 PROCEDURE — 87880 STREP A ASSAY W/OPTIC: CPT

## 2022-03-17 PROCEDURE — 96372 THER/PROPH/DIAG INJ SC/IM: CPT

## 2022-03-17 PROCEDURE — 87636 SARSCOV2 & INF A&B AMP PRB: CPT

## 2022-03-17 PROCEDURE — 87070 CULTURE OTHR SPECIMN AEROBIC: CPT

## 2022-03-17 PROCEDURE — G0382 LEV 3 HOSP TYPE B ED VISIT: HCPCS

## 2022-03-17 RX ORDER — AZITHROMYCIN 250 MG/1
TABLET, FILM COATED ORAL
Qty: 6 TABLET | Refills: 0 | Status: SHIPPED | OUTPATIENT
Start: 2022-03-17 | End: 2022-03-21

## 2022-03-17 RX ORDER — DEXAMETHASONE SODIUM PHOSPHATE 10 MG/ML
10 INJECTION, SOLUTION INTRAMUSCULAR; INTRAVENOUS ONCE
Status: COMPLETED | OUTPATIENT
Start: 2022-03-17 | End: 2022-03-17

## 2022-03-17 RX ADMIN — DEXAMETHASONE SODIUM PHOSPHATE 10 MG: 10 INJECTION, SOLUTION INTRAMUSCULAR; INTRAVENOUS at 16:47

## 2022-03-17 NOTE — PROGRESS NOTES
[facility]        NAME: Jeremias Contreras is a 46 y o  male  : 1970    MRN: [id number]  DATE: 2022  TIME: 4:50 PM      Assessment and Plan     Acute tonsillitis, unspecified etiology [J03 90]  1  Acute tonsillitis, unspecified etiology  POCT rapid strepA    Throat culture    Cov/Flu-Collected at [facility]    azithromycin (ZITHROMAX) 250 mg tablet    dexamethasone (PF) (DECADRON) injection 10 mg       poct strep negative  Throat culture sent  Decadron given for tonsil swelling  Will treat d/t throat examination, absence of cough  Patient Instructions   Recommend throat lozenges and gargling warm salt water for throat irritation  Take antibiotic as prescribed  Recommend probiotic use while taking antibiotic  Hydration and rest   Acetaminophen and ibuprofen for pain and fever  COVID/influenza testing  Throat cultures sent  Use the [facility] MyChart to obtain lab results  PCP follow up in 3-5 days  Go to an emergency department if difficulty breathing occurs or if symptoms worsen  Chief Complaint     Chief Complaint   Patient presents with    Sore Throat     today: started with sore throat, chills, nasal/ congestion, & nausea  History of Present Illness     Patient is a 69-year-old male who presents with sore throat, nasal congestion, and chills  Reports nausea, no vomiting  Reports he felt dizzy when walking up the stairs earlier, denies at this time  Reports sensation of swelling in his throat and post nasal drip  Denies vomiting or diarrhea  Denies cough  Denies headache  States his wife was recently sick with a viral illness  Patient did have covid-19 in 2022  Denies DM history  Review of Systems     Review of Systems   Constitutional: Negative for chills and fever  HENT: Positive for congestion, postnasal drip and sore throat  Negative for rhinorrhea and voice change  Respiratory: Negative for cough      Cardiovascular: Negative for chest pain  Gastrointestinal: Positive for nausea  Negative for abdominal pain, diarrhea and vomiting  Neurological: Negative for headaches  All other systems reviewed and are negative  Current Medications       Current Outpatient Medications:     levocetirizine (XYZAL) 5 MG tablet, Take 1 tablet (5 mg total) by mouth every evening, Disp: 30 tablet, Rfl: 11    lisinopril (ZESTRIL) 10 mg tablet, Take 1 tablet (10 mg total) by mouth daily, Disp: 90 tablet, Rfl: 3    Omega-3 1000 MG CAPS, Take by mouth, Disp: , Rfl:     Red Yeast Rice 600 MG TABS, Take by mouth, Disp: , Rfl:     rivaroxaban (Xarelto) 20 mg tablet, Take 1 tablet (20 mg total) by mouth daily with breakfast, Disp: 90 tablet, Rfl: 3    azithromycin (ZITHROMAX) 250 mg tablet, Take 2 tablets today then 1 tablet daily x 4 days, Disp: 6 tablet, Rfl: 0    predniSONE 10 mg tablet, 4 pills for 3 days, then 3 pills for 3 days, then 2 pills for 3 days, then 1 pills for 3 days, then stop (Patient not taking: Reported on 3/17/2022 ), Disp: 30 tablet, Rfl: 0  No current facility-administered medications for this visit      Current Allergies     Allergies as of 03/17/2022 - Reviewed 03/17/2022   Allergen Reaction Noted    Penicillins  05/30/2019    Iodine - food allergy Other (See Comments) 12/30/2016              The following portions of the patient's history were reviewed and updated as appropriate: allergies, current medications, past family history, past medical history, past social history, past surgical history and problem list      Past Medical History:   Diagnosis Date    BPH with obstruction/lower urinary tract symptoms     COVID-19 01/2021    [address] [facility]) [address] [address]     Erectile dysfunction due to arterial insufficiency     Hypertension     No known health problems     history of denial of any significant medical , no pertinent past medical history per allscripts    Testicular hypogonadism     Varicocele Past Surgical History:   Procedure Laterality Date    EYE SURGERY      INGUINAL HERNIA REPAIR      UMBILICAL HERNIA REPAIR      per allscripts    VASECTOMY      VEIN LIGATION AND STRIPPING         Family History   Problem Relation Age of Onset    Heart disease Mother     Heart attack Mother     Breast cancer Mother     Hypertension Mother     Cerebral aneurysm Mother     Prostate cancer Father     Bone cancer Father     Diabetes type II Father     Testicular cancer Cousin     Coronary artery disease Maternal Grandmother     Colon cancer Paternal Grandmother     No Known Problems Son     No Known Problems Son          Medications have been verified  Objective     /79   Pulse 94   Temp 98 2 °F (36 8 °C)   Resp 18   SpO2 97%   No LMP for male patient  Physical Exam     Physical Exam  Vitals and nursing note reviewed  Constitutional:       General: He is awake  He is not in acute distress  Appearance: Normal appearance  He is not ill-appearing or toxic-appearing  HENT:      Right Ear: Tympanic membrane, ear canal and external ear normal  Tympanic membrane is not injected or erythematous  Left Ear: Tympanic membrane, ear canal and external ear normal  Tympanic membrane is not injected or erythematous  Nose: Mucosal edema and congestion present  No rhinorrhea  Mouth/Throat:      Lips: Pink  Mouth: Mucous membranes are moist       Tongue: No lesions  Pharynx: Oropharynx is clear  Uvula midline  Posterior oropharyngeal erythema present  No pharyngeal swelling or oropharyngeal exudate  Tonsils: Tonsillar exudate present  No tonsillar abscesses  2+ on the right  3+ on the left  Comments: Airway patient, no drooling  Cardiovascular:      Rate and Rhythm: Normal rate  Pulses: Normal pulses  Heart sounds: Normal heart sounds, S1 normal and S2 normal  No murmur heard        Pulmonary:      Effort: Pulmonary effort is normal  No respiratory distress  Breath sounds: Normal breath sounds and air entry  No stridor, decreased air movement or transmitted upper airway sounds  No decreased breath sounds, wheezing, rhonchi or rales  Musculoskeletal:      Cervical back: Neck supple  Lymphadenopathy:      Cervical: Cervical adenopathy present  Skin:     General: Skin is warm  Capillary Refill: Capillary refill takes less than 2 seconds  Neurological:      Mental Status: He is alert  Psychiatric:         Mood and Affect: Mood normal          Behavior: Behavior normal          Thought Content:  Thought content normal          Judgment: Judgment normal

## 2022-03-17 NOTE — PATIENT INSTRUCTIONS
Recommend throat lozenges and gargling warm salt water for throat irritation  Take antibiotic as prescribed  Recommend probiotic use while taking antibiotic  Hydration and rest   Acetaminophen and ibuprofen for pain and fever  COVID/influenza testing  Throat cultures sent  Use the [facility] to obtain lab results  PCP follow up in 3-5 days  Go to an emergency department if difficulty breathing occurs or if symptoms worsen  Tonsillitis   AMBULATORY CARE:   Tonsillitis  is inflammation of your tonsils  Tonsils are the lumps of tissue on both sides of the back of your throat  Tonsils are part of your immune system  They help you fight infections  Tonsillitis is usually caused by bacteria or a virus  Recurrent tonsillitis is when you have tonsillitis many times in 1 year  Chronic tonsillitis is when you have a sore throat that lasts 3 months or longer  Common symptoms include the following:   · Severe sore throat    · Red, swollen tonsils    · Painful swallowing    · Fever and chills    · Bad breath    · White spots on the tonsils    Call 911 for the following:   · Trouble breathing because your tonsils are swollen      Treatment for tonsillitis  may include any of the following:  · Acetaminophen  decreases pain and fever  It is available without a doctor's order  Ask how much to take and how often to take it  Follow directions  Acetaminophen can cause liver damage if not taken correctly  · NSAIDs , such as ibuprofen, help decrease swelling, pain, and fever  This medicine is available with or without a doctor's order  NSAIDs can cause stomach bleeding or kidney problems in certain people  If you take blood thinner medicine, always ask your healthcare provider if NSAIDs are safe for you  Always read the medicine label and follow directions  · Antibiotics  help treat a bacterial infection  · A tonsillectomy  is surgery to remove your tonsils   You may need surgery if you have chronic or recurrent tonsillitis  Surgery is also done if antibiotics are not getting rid of your tonsillitis  Rest  when you feel it is needed  Slowly start to do more each day  Return to your daily activities as directed  Drink liquids as directed: You may need to drink more liquid than usual to help prevent dehydration  Ask how much liquid to drink each day and which liquids are best for you  Gargle with warm salt water: This may help decrease throat pain  Mix 1 teaspoon of salt in 8 ounces of warm water  Ask how often you should do this  Follow up with your doctor as directed:  Write down your questions so you remember to ask them during your visits  © Copyright [employer] 2022 Information is for End User's use only and may not be sold, redistributed or otherwise used for commercial purposes  All illustrations and images included in CareNotes® are the copyrighted property of [employer] Inc  or [facility] Karly Lockett   The above information is an  only  It is not intended as medical advice for individual conditions or treatments  Talk to your doctor, nurse or pharmacist before following any medical regimen to see if it is safe and effective for you

## 2022-03-18 LAB
FLUAV RNA RESP QL NAA+PROBE: NEGATIVE
FLUBV RNA RESP QL NAA+PROBE: NEGATIVE
SARS-COV-2 RNA RESP QL NAA+PROBE: NEGATIVE

## 2022-03-19 ENCOUNTER — OFFICE VISIT (OUTPATIENT)
Dept: URGENT CARE | Facility: CLINIC | Age: 52
End: 2022-03-19
Payer: COMMERCIAL

## 2022-03-19 VITALS — HEART RATE: 92 BPM | TEMPERATURE: 98.1 F | OXYGEN SATURATION: 97 % | RESPIRATION RATE: 18 BRPM

## 2022-03-19 DIAGNOSIS — J03.90 ACUTE TONSILLITIS, UNSPECIFIED ETIOLOGY: Primary | ICD-10-CM

## 2022-03-19 LAB — BACTERIA THROAT CULT: NORMAL

## 2022-03-19 PROCEDURE — G0382 LEV 3 HOSP TYPE B ED VISIT: HCPCS

## 2022-03-19 NOTE — PROGRESS NOTES
[facility]        NAME: Shital Polk is a 46 y o  male  : 1970    MRN: [id number]  DATE: 2022  TIME: 2:31 PM      Assessment and Plan     Acute tonsillitis, unspecified etiology [J03 90]  1  Acute tonsillitis, unspecified etiology         Pt was given decadron within 72 hours  Aware to continue with antibiotic that was previously prescribed  Verbalizes strict guidelines to proceed to ER if symptoms worsen  No s/s of peritonsillar abscess at this time  Patient Instructions     Follow up with PCP in 3-5 days  Proceed to  ER if symptoms worsen  Chief Complaint     Chief Complaint   Patient presents with    Sore Throat     x3 days: sore thraot with b/l neck gland swelling & hoarse voice  Started Z-Pack today  History of Present Illness     Patient is a 76-year-old male who presents with sore throat and swollen glands  Patient was seen on 3/17/22 and diagnosed with tonsillitis  Patient was given a dose of decadron at that time and prescribed azithryomycin Patient states yesterday he felt like he was never sick  States today his throat felt raw again and swollen  Reports hoarse voice  Patient states he did not start the prescribed antibiotic until this morning  Denies fever or chills  Denies N/V/D  Patients covid/influenza came back negative  Throat culture came back negative  Review of Systems     Review of Systems   Constitutional: Negative for chills and fever  HENT: Positive for sore throat and voice change  Negative for facial swelling and trouble swallowing  Respiratory: Negative for cough  Gastrointestinal: Negative for abdominal pain, diarrhea, nausea and vomiting  Skin: Negative for rash  All other systems reviewed and are negative          Current Medications       Current Outpatient Medications:     azithromycin (ZITHROMAX) 250 mg tablet, Take 2 tablets today then 1 tablet daily x 4 days, Disp: 6 tablet, Rfl: 0    levocetirizine (XYZAL) 5 MG tablet, Take 1 tablet (5 mg total) by mouth every evening, Disp: 30 tablet, Rfl: 11    lisinopril (ZESTRIL) 10 mg tablet, Take 1 tablet (10 mg total) by mouth daily, Disp: 90 tablet, Rfl: 3    Omega-3 1000 MG CAPS, Take by mouth, Disp: , Rfl:     Red Yeast Rice 600 MG TABS, Take by mouth, Disp: , Rfl:     rivaroxaban (Xarelto) 20 mg tablet, Take 1 tablet (20 mg total) by mouth daily with breakfast, Disp: 90 tablet, Rfl: 3    predniSONE 10 mg tablet, 4 pills for 3 days, then 3 pills for 3 days, then 2 pills for 3 days, then 1 pills for 3 days, then stop (Patient not taking: Reported on 3/17/2022 ), Disp: 30 tablet, Rfl: 0    Current Allergies     Allergies as of 03/19/2022 - Reviewed 03/19/2022   Allergen Reaction Noted    Penicillins  05/30/2019    Iodine - food allergy Other (See Comments) 12/30/2016              The following portions of the patient's history were reviewed and updated as appropriate: allergies, current medications, past family history, past medical history, past social history, past surgical history and problem list      Past Medical History:   Diagnosis Date    BPH with obstruction/lower urinary tract symptoms     COVID-19 01/2021    Deep vein thrombosis (DVT) ([facility] [address] )     Erectile dysfunction due to arterial insufficiency     Hypertension     No known health problems     history of denial of any significant medical , no pertinent past medical history per allscripts    Testicular hypogonadism     Varicocele        Past Surgical History:   Procedure Laterality Date    EYE SURGERY      INGUINAL HERNIA REPAIR      UMBILICAL HERNIA REPAIR      per allscripts    VASECTOMY      VEIN LIGATION AND STRIPPING         Family History   Problem Relation Age of Onset    Heart disease Mother     Heart attack Mother     Breast cancer Mother     Hypertension Mother     Cerebral aneurysm Mother     Prostate cancer Father     Bone cancer Father     Diabetes type II Father     Testicular cancer Cousin     Coronary artery disease Maternal Grandmother     Colon cancer Paternal Grandmother     No Known Problems Son     No Known Problems Son          Medications have been verified  Objective     Pulse 92   Temp 98 1 °F (36 7 °C)   Resp 18   SpO2 97%   No LMP for male patient  Physical Exam     Physical Exam  Vitals and nursing note reviewed  Constitutional:       General: He is awake  He is not in acute distress  Appearance: Normal appearance  He is well-developed  He is not ill-appearing, toxic-appearing or diaphoretic  HENT:      Head:      Comments: No facial swelling noted  Right Ear: Tympanic membrane, ear canal and external ear normal  Tympanic membrane is not injected or erythematous  Left Ear: Tympanic membrane, ear canal and external ear normal  Tympanic membrane is not injected or erythematous  Nose: No mucosal edema, congestion or rhinorrhea  Mouth/Throat:      Lips: Pink  Mouth: Mucous membranes are moist       Pharynx: Oropharynx is clear  Uvula midline  Posterior oropharyngeal erythema present  No pharyngeal swelling, oropharyngeal exudate or uvula swelling  Tonsils: Tonsillar exudate (left > right) present  No tonsillar abscesses  2+ on the right  3+ on the left  Comments: No drooling  Airway patent  Cardiovascular:      Rate and Rhythm: Normal rate  Pulses: Normal pulses  Heart sounds: Normal heart sounds, S1 normal and S2 normal    Musculoskeletal:      Cervical back: Neck supple  Lymphadenopathy:      Cervical: Cervical adenopathy (left > right) present  Skin:     General: Skin is warm  Capillary Refill: Capillary refill takes less than 2 seconds  Neurological:      Mental Status: He is alert

## 2022-03-19 NOTE — PATIENT INSTRUCTIONS
Continue with the antibiotic that was previously prescribed- if no improvement if symptoms in 24 hours or symptoms worsen, proceed to the ER for further evaluation  Recommend throat lozenges and gargling warm salt water for throat irritation  Acetaminophen or ibuprofen for fever and pain  Follow-up with PCP in 3-5 days  Go to ER if symptoms worsen  Tonsillitis   AMBULATORY CARE:   Tonsillitis  is inflammation of your tonsils  Tonsils are the lumps of tissue on both sides of the back of your throat  Tonsils are part of your immune system  They help you fight infections  Tonsillitis is usually caused by bacteria or a virus  Recurrent tonsillitis is when you have tonsillitis many times in 1 year  Chronic tonsillitis is when you have a sore throat that lasts 3 months or longer  Common symptoms include the following:   · Severe sore throat    · Red, swollen tonsils    · Painful swallowing    · Fever and chills    · Bad breath    · White spots on the tonsils    Call 911 for the following:   · Trouble breathing because your tonsils are swollen      Treatment for tonsillitis  may include any of the following:  · Acetaminophen  decreases pain and fever  It is available without a doctor's order  Ask how much to take and how often to take it  Follow directions  Acetaminophen can cause liver damage if not taken correctly  · NSAIDs , such as ibuprofen, help decrease swelling, pain, and fever  This medicine is available with or without a doctor's order  NSAIDs can cause stomach bleeding or kidney problems in certain people  If you take blood thinner medicine, always ask your healthcare provider if NSAIDs are safe for you  Always read the medicine label and follow directions  · Antibiotics  help treat a bacterial infection  · A tonsillectomy  is surgery to remove your tonsils  You may need surgery if you have chronic or recurrent tonsillitis   Surgery is also done if antibiotics are not getting rid of your tonsillitis  Rest  when you feel it is needed  Slowly start to do more each day  Return to your daily activities as directed  Drink liquids as directed: You may need to drink more liquid than usual to help prevent dehydration  Ask how much liquid to drink each day and which liquids are best for you  Gargle with warm salt water: This may help decrease throat pain  Mix 1 teaspoon of salt in 8 ounces of warm water  Ask how often you should do this  Follow up with your doctor as directed:  Write down your questions so you remember to ask them during your visits  © Copyright [address] 2022 Information is for End User's use only and may not be sold, redistributed or otherwise used for commercial purposes  All illustrations and images included in CareNotes® are the copyrighted property of [employer]  or [employer]  The above information is an  only  It is not intended as medical advice for individual conditions or treatments  Talk to your doctor, nurse or pharmacist before following any medical regimen to see if it is safe and effective for you

## 2022-04-08 ENCOUNTER — APPOINTMENT (OUTPATIENT)
Dept: LAB | Facility: CLINIC | Age: 52
End: 2022-04-08
Payer: COMMERCIAL

## 2022-04-08 DIAGNOSIS — Z12.5 SCREENING FOR PROSTATE CANCER: ICD-10-CM

## 2022-04-08 DIAGNOSIS — Z11.59 NEED FOR HEPATITIS C SCREENING TEST: ICD-10-CM

## 2022-04-08 DIAGNOSIS — I10 BENIGN ESSENTIAL HYPERTENSION: ICD-10-CM

## 2022-04-08 LAB
ALBUMIN SERPL BCP-MCNC: 3.7 G/DL (ref 3.5–5)
ALP SERPL-CCNC: 66 U/L (ref 46–116)
ALT SERPL W P-5'-P-CCNC: 40 U/L (ref 12–78)
ANION GAP SERPL CALCULATED.3IONS-SCNC: 7 MMOL/L (ref 4–13)
AST SERPL W P-5'-P-CCNC: 24 U/L (ref 5–45)
BASOPHILS # BLD AUTO: 0.04 THOUSANDS/ΜL (ref 0–0.1)
BASOPHILS NFR BLD AUTO: 1 % (ref 0–1)
BILIRUB SERPL-MCNC: 0.67 MG/DL (ref 0.2–1)
BUN SERPL-MCNC: 14 MG/DL (ref 5–25)
CALCIUM SERPL-MCNC: 9.2 MG/DL (ref 8.3–10.1)
CHLORIDE SERPL-SCNC: 106 MMOL/L (ref 100–108)
CHOLEST SERPL-MCNC: 150 MG/DL
CO2 SERPL-SCNC: 25 MMOL/L (ref 21–32)
CREAT SERPL-MCNC: 1.05 MG/DL (ref 0.6–1.3)
EOSINOPHIL # BLD AUTO: 0.08 THOUSAND/ΜL (ref 0–0.61)
EOSINOPHIL NFR BLD AUTO: 1 % (ref 0–6)
ERYTHROCYTE [DISTWIDTH] IN BLOOD BY AUTOMATED COUNT: 12.1 % (ref 11.6–15.1)
GFR SERPL CREATININE-BSD FRML MDRD: 80 ML/MIN/1.73SQ M
GLUCOSE P FAST SERPL-MCNC: 88 MG/DL (ref 65–99)
HCT VFR BLD AUTO: 44.1 % (ref 36.5–49.3)
HCV AB SER QL: NORMAL
HDLC SERPL-MCNC: 44 MG/DL
HGB BLD-MCNC: 15.5 G/DL (ref 12–17)
IMM GRANULOCYTES # BLD AUTO: 0.01 THOUSAND/UL (ref 0–0.2)
IMM GRANULOCYTES NFR BLD AUTO: 0 % (ref 0–2)
LDLC SERPL CALC-MCNC: 95 MG/DL (ref 0–100)
LYMPHOCYTES # BLD AUTO: 1.63 THOUSANDS/ΜL (ref 0.6–4.47)
LYMPHOCYTES NFR BLD AUTO: 25 % (ref 14–44)
MCH RBC QN AUTO: 31.3 PG (ref 26.8–34.3)
MCHC RBC AUTO-ENTMCNC: 35.1 G/DL (ref 31.4–37.4)
MCV RBC AUTO: 89 FL (ref 82–98)
MONOCYTES # BLD AUTO: 0.84 THOUSAND/ΜL (ref 0.17–1.22)
MONOCYTES NFR BLD AUTO: 13 % (ref 4–12)
NEUTROPHILS # BLD AUTO: 3.94 THOUSANDS/ΜL (ref 1.85–7.62)
NEUTS SEG NFR BLD AUTO: 60 % (ref 43–75)
NONHDLC SERPL-MCNC: 106 MG/DL
NRBC BLD AUTO-RTO: 0 /100 WBCS
PLATELET # BLD AUTO: 263 THOUSANDS/UL (ref 149–390)
PMV BLD AUTO: 10 FL (ref 8.9–12.7)
POTASSIUM SERPL-SCNC: 4.1 MMOL/L (ref 3.5–5.3)
PROT SERPL-MCNC: 7.6 G/DL (ref 6.4–8.2)
PSA SERPL-MCNC: 0.6 NG/ML (ref 0–4)
RBC # BLD AUTO: 4.95 MILLION/UL (ref 3.88–5.62)
SODIUM SERPL-SCNC: 138 MMOL/L (ref 136–145)
TRIGL SERPL-MCNC: 54 MG/DL
TSH SERPL DL<=0.05 MIU/L-ACNC: 3.68 UIU/ML (ref 0.45–4.5)
WBC # BLD AUTO: 6.54 THOUSAND/UL (ref 4.31–10.16)

## 2022-04-08 PROCEDURE — 85025 COMPLETE CBC W/AUTO DIFF WBC: CPT

## 2022-04-08 PROCEDURE — 80053 COMPREHEN METABOLIC PANEL: CPT

## 2022-04-08 PROCEDURE — G0103 PSA SCREENING: HCPCS

## 2022-04-08 PROCEDURE — 36415 COLL VENOUS BLD VENIPUNCTURE: CPT

## 2022-04-08 PROCEDURE — 80061 LIPID PANEL: CPT

## 2022-04-08 PROCEDURE — 84443 ASSAY THYROID STIM HORMONE: CPT

## 2022-04-08 PROCEDURE — 86803 HEPATITIS C AB TEST: CPT

## 2022-04-13 ENCOUNTER — OFFICE VISIT (OUTPATIENT)
Dept: UROLOGY | Facility: MEDICAL CENTER | Age: 52
End: 2022-04-13
Payer: COMMERCIAL

## 2022-04-13 VITALS
SYSTOLIC BLOOD PRESSURE: 138 MMHG | BODY MASS INDEX: 32.07 KG/M2 | WEIGHT: 242 LBS | HEIGHT: 73 IN | DIASTOLIC BLOOD PRESSURE: 82 MMHG

## 2022-04-13 DIAGNOSIS — N40.1 BPH WITH URINARY OBSTRUCTION: Primary | ICD-10-CM

## 2022-04-13 DIAGNOSIS — N13.8 BPH WITH URINARY OBSTRUCTION: Primary | ICD-10-CM

## 2022-04-13 DIAGNOSIS — N52.9 ERECTILE DYSFUNCTION, UNSPECIFIED ERECTILE DYSFUNCTION TYPE: ICD-10-CM

## 2022-04-13 DIAGNOSIS — N50.89 TESTICULAR MICROLITHIASIS: ICD-10-CM

## 2022-04-13 LAB
SL AMB  POCT GLUCOSE, UA: NORMAL
SL AMB LEUKOCYTE ESTERASE,UA: NEGATIVE
SL AMB POCT BILIRUBIN,UA: NEGATIVE
SL AMB POCT BLOOD,UA: NEGATIVE
SL AMB POCT CLARITY,UA: CLEAR
SL AMB POCT COLOR,UA: YELLOW
SL AMB POCT KETONES,UA: NORMAL
SL AMB POCT NITRITE,UA: NEGATIVE
SL AMB POCT PH,UA: 5.5
SL AMB POCT SPECIFIC GRAVITY,UA: 1.02
SL AMB POCT URINE PROTEIN: NEGATIVE
SL AMB POCT UROBILINOGEN: 0.2

## 2022-04-13 PROCEDURE — 3075F SYST BP GE 130 - 139MM HG: CPT | Performed by: UROLOGY

## 2022-04-13 PROCEDURE — 3079F DIAST BP 80-89 MM HG: CPT | Performed by: UROLOGY

## 2022-04-13 PROCEDURE — 99214 OFFICE O/P EST MOD 30 MIN: CPT | Performed by: UROLOGY

## 2022-04-13 PROCEDURE — 81002 URINALYSIS NONAUTO W/O SCOPE: CPT | Performed by: UROLOGY

## 2022-04-13 RX ORDER — SODIUM PICOSULFATE, MAGNESIUM OXIDE, AND ANHYDROUS CITRIC ACID 10; 3.5; 12 MG/160ML; G/160ML; G/160ML
LIQUID ORAL
COMMUNITY
Start: 2022-04-11

## 2022-04-13 NOTE — PROGRESS NOTES
Assessment/Plan:    BPH with urinary obstruction  AUA SS is 4- he is pleased with his voiding pattern  Urinalysis is negative today  PSA is stable at 0 6 (4/8/22)  We will continue to follow with watchful waiting  He will return in 1 year and we will recheck PSA and UA prior  Testicular microlithiasis  He has a history of microlithiasis  Unable to adequately examine the testes today  Will check ultrasound to ensure the testes are unremarkable  Male erectile dysfunction  He is generally doing well at this time  We will follow for now  Diagnoses and all orders for this visit:    BPH with urinary obstruction  -     POCT urine dip  -     PSA Total, Diagnostic; Future  -     Urinalysis with microscopic; Future    Testicular microlithiasis  -     US scrotum and groin area; Future    Erectile dysfunction, unspecified erectile dysfunction type    Other orders  -     Clenpiq 10-3 5-12 MG-GM -GM/160ML SOLN          Subjective:      Patient ID: Cindy Berry is a 46 y o  male  Benign Prostatic Hypertrophy  This is a chronic problem  The current episode started more than 1 year ago  The problem is unchanged  Irritative symptoms do not include frequency or urgency  Nocturia x 1  Obstructive symptoms do not include dribbling, incomplete emptying, an intermittent stream, a slower stream, straining or a weak stream  occasional slow stream  Pertinent negatives include no chills, dysuria, genital pain, hematuria, hesitancy, nausea or vomiting  AUA score is 0-7  He is sexually active  Nothing aggravates the symptoms  Past treatments include nothing  Erectile Dysfunction  This is a chronic problem  The current episode started more than 1 year ago  The problem is unchanged  The nature of his difficulty is maintaining erection  Irritative symptoms do not include frequency or urgency  Nocturia x 1   Obstructive symptoms do not include dribbling, incomplete emptying, an intermittent stream, a slower stream, straining or a weak stream  occasional slow stream  Pertinent negatives include no chills, dysuria, genital pain, hematuria or hesitancy  The symptoms are aggravated by stress  Past treatments include sildenafil  The treatment provided significant relief  He has had abnormal vision and flushing caused by medications  Risk factors include BPH  He is doing well with the his erections  He did not like the side effects of viagra  He is pleased with his voiding pattern  The following portions of the patient's history were reviewed and updated as appropriate: allergies, current medications, past family history, past medical history, past social history, past surgical history and problem list     Review of Systems   Constitutional: Negative  Negative for chills, diaphoresis, fatigue and fever  He's regained his weight   HENT: Negative  Eyes: Negative  Respiratory: Negative  Cardiovascular: Negative  Gastrointestinal: Negative  Negative for nausea and vomiting  Endocrine: Negative  Genitourinary: Negative for dysuria, frequency, hematuria, hesitancy, incomplete emptying and urgency  See HPI   Musculoskeletal: Negative  Skin: Negative  Allergic/Immunologic: Negative  Neurological: Negative  Hematological: Negative  Psychiatric/Behavioral: Negative          AUA SYMPTOM SCORE      Most Recent Value   AUA SYMPTOM SCORE    How often have you had a sensation of not emptying your bladder completely after you finished urinating? 1 (P)     How often have you had to urinate again less than two hours after you finished urinating? 1 (P)     How often have you found you stopped and started again several times when you urinate? 0 (P)     How often have you found it difficult to postpone urination? 0 (P)     How often have you had a weak urinary stream? 1 (P)     How often have you had to push or strain to begin urination? 0 (P)     How many times did you most typically get up to urinate from the time you went to bed at night until the time you got up in the morning? 1 (P)     Quality of Life: If you were to spend the rest of your life with your urinary condition just the way it is now, how would you feel about that? 1 (P)     AUA SYMPTOM SCORE 4 (P)           Objective:      /82 (BP Location: Left arm, Patient Position: Sitting, Cuff Size: Standard)   Ht 6' 1" (1 854 m)   Wt 110 kg (242 lb)   BMI 31 93 kg/m²          Physical Exam  Constitutional:       Appearance: He is well-developed  HENT:      Head: Normocephalic and atraumatic  Eyes:      Conjunctiva/sclera: Conjunctivae normal    Cardiovascular:      Rate and Rhythm: Normal rate  Pulmonary:      Effort: Pulmonary effort is normal    Abdominal:      General: Bowel sounds are normal  There is no distension  Palpations: Abdomen is soft  There is no mass  Tenderness: There is no abdominal tenderness  There is no right CVA tenderness, left CVA tenderness, guarding or rebound  Hernia: No hernia is present  Genitourinary:     Penis: Normal  No phimosis or hypospadias  Testes: Normal          Right: Mass not present  Left: Mass not present  Rectum: Normal       Comments: Both testes were retractile in nature, Left testicle normal to palpation  Could not bring right testicle down and very tender- unable to examine well  Prostate 1+ enlarged and palpably benign  Musculoskeletal:         General: Normal range of motion  Cervical back: Neck supple  Skin:     General: Skin is warm and dry  Neurological:      General: No focal deficit present  Mental Status: He is alert and oriented to person, place, and time  Psychiatric:         Mood and Affect: Mood normal          Behavior: Behavior normal          Thought Content:  Thought content normal          Judgment: Judgment normal

## 2022-04-13 NOTE — PATIENT INSTRUCTIONS
Enlarged Prostate (BPH)   WHAT YOU NEED TO KNOW:   An enlarged prostate (BPH) is a common condition in older adults  BPH develops because the number of prostate cells increases (hyperplasia) or the cells get bigger (hypertrophy)  The prostate wraps around the urethra  An enlarged prostate can press on the urethra  This may cause problems with storing urine or emptying your bladder completely  DISCHARGE INSTRUCTIONS:   Call your doctor or urologist if:   · You see blood in your urine  · You are not able to urinate  · Your bladder feels very full and painful  · You have new or worsening symptoms  · You have a fever  · You have questions or concerns about your condition or care  Medicines:   · Medicines  may be used to relax the muscles in your prostate and bladder  This may help you urinate more easily  You may also need medicine that helps shrink, or slow the growth of your prostate  · Take your medicine as directed  Contact your healthcare provider if you think your medicine is not helping or if you have side effects  Tell him or her if you are allergic to any medicine  Keep a list of the medicines, vitamins, and herbs you take  Include the amounts, and when and why you take them  Bring the list or the pill bottles to follow-up visits  Carry your medicine list with you in case of an emergency  What you can do to manage your symptoms:   · Urinate on a regular schedule  This will train your bladder to hold urine longer  A larger amount of urine may make it easier to urinate  · Drink less liquid during the day  Do not have liquid for several hours before you go to bed at night  Do not drink large amounts of any liquid at one time  · Limit alcohol and caffeine  These can irritate your bladder and make your symptoms worse  · Eat less salt  Salt can cause fluid buildup and make it harder to urinate  Examples of salty foods are chips, cured meats, and canned soups   Do not use table salt  · Elevate your legs if you have swelling  Elevate (raise) your legs above the level of your heart  This can relieve swelling caused by fluid buildup  You may not have to get up in the night to urinate  · Exercise regularly  Exercise can help improve your symptoms  Ask your healthcare provider what a healthy weight for you is  Aim to get at least 30 minutes of exercise on most days of the week  Follow up with your doctor or urologist as directed:  Write down your questions so you remember to ask them during your visits  © Copyright [employer] 2022 Information is for End User's use only and may not be sold, redistributed or otherwise used for commercial purposes  All illustrations and images included in CareNotes® are the copyrighted property of [employer]  or [employer]  The above information is an  only  It is not intended as medical advice for individual conditions or treatments  Talk to your doctor, nurse or pharmacist before following any medical regimen to see if it is safe and effective for you

## 2022-04-13 NOTE — ASSESSMENT & PLAN NOTE
AUA SS is 4- he is pleased with his voiding pattern  Urinalysis is negative today  PSA is stable at 0 6 (4/8/22)  We will continue to follow with watchful waiting  He will return in 1 year and we will recheck PSA and UA prior

## 2022-04-13 NOTE — ASSESSMENT & PLAN NOTE
He has a history of microlithiasis  Unable to adequately examine the testes today  Will check ultrasound to ensure the testes are unremarkable

## 2022-04-21 ENCOUNTER — OFFICE VISIT (OUTPATIENT)
Dept: FAMILY MEDICINE CLINIC | Facility: CLINIC | Age: 52
End: 2022-04-21
Payer: COMMERCIAL

## 2022-04-21 VITALS
HEIGHT: 73 IN | SYSTOLIC BLOOD PRESSURE: 124 MMHG | DIASTOLIC BLOOD PRESSURE: 74 MMHG | WEIGHT: 256 LBS | TEMPERATURE: 97.6 F | HEART RATE: 87 BPM | BODY MASS INDEX: 33.93 KG/M2 | OXYGEN SATURATION: 99 %

## 2022-04-21 DIAGNOSIS — E66.09 CLASS 1 OBESITY DUE TO EXCESS CALORIES WITH SERIOUS COMORBIDITY AND BODY MASS INDEX (BMI) OF 33.0 TO 33.9 IN ADULT: ICD-10-CM

## 2022-04-21 DIAGNOSIS — I10 BENIGN ESSENTIAL HYPERTENSION: ICD-10-CM

## 2022-04-21 DIAGNOSIS — E78.00 HYPERCHOLESTEROLEMIA: ICD-10-CM

## 2022-04-21 DIAGNOSIS — Z00.00 ANNUAL PHYSICAL EXAM: Primary | ICD-10-CM

## 2022-04-21 DIAGNOSIS — Z86.718 HISTORY OF DVT (DEEP VEIN THROMBOSIS): ICD-10-CM

## 2022-04-21 PROCEDURE — 3725F SCREEN DEPRESSION PERFORMED: CPT | Performed by: FAMILY MEDICINE

## 2022-04-21 PROCEDURE — 99396 PREV VISIT EST AGE 40-64: CPT | Performed by: FAMILY MEDICINE

## 2022-04-21 PROCEDURE — 99213 OFFICE O/P EST LOW 20 MIN: CPT | Performed by: FAMILY MEDICINE

## 2022-04-21 PROCEDURE — 1036F TOBACCO NON-USER: CPT | Performed by: FAMILY MEDICINE

## 2022-04-21 PROCEDURE — 3008F BODY MASS INDEX DOCD: CPT | Performed by: FAMILY MEDICINE

## 2022-04-21 NOTE — PROGRESS NOTES
Assessment/Plan:    No problem-specific Assessment & Plan notes found for this encounter  Diagnoses and all orders for this visit:    Annual physical exam    Benign essential hypertension  Comments:  no change in the medication    Hypercholesterolemia  Comments:  pt counseled on diet and exercise    Class 1 obesity due to excess calories with serious comorbidity and body mass index (BMI) of 33 0 to 33 9 in adult    History of DVT (deep vein thrombosis)  -     rivaroxaban (Xarelto) 10 mg tablet; Take 1 tablet (10 mg total) by mouth daily          PHQ-2/9 Depression Screening    Little interest or pleasure in doing things: 0 - not at all  Feeling down, depressed, or hopeless: 0 - not at all  PHQ-2 Score: 0  PHQ-2 Interpretation: Negative depression screen        BMI Counseling: Body mass index is 33 78 kg/m²  The BMI is above normal  Nutrition recommendations include reducing portion sizes and 3-5 servings of fruits/vegetables daily  Exercise recommendations include moderate aerobic physical activity for 150 minutes/week and exercising 3-5 times per week  Subjective:      Patient ID: Kane Ferrer is a 46 y o  male  Pt here for annual physical      The following portions of the patient's history were reviewed and updated as appropriate: allergies, current medications, past family history, past medical history, past social history, past surgical history and problem list     Review of Systems   Constitutional: Negative  Negative for chills, fatigue and fever  HENT: Negative  Negative for hearing loss  Eyes: Negative  Negative for visual disturbance  Respiratory: Negative for shortness of breath and wheezing  Cardiovascular: Negative for chest pain and palpitations  Gastrointestinal: Negative for abdominal pain, blood in stool, constipation, diarrhea, nausea and vomiting  Endocrine: Negative  Genitourinary: Negative for difficulty urinating and dysuria     Musculoskeletal: Negative for arthralgias and myalgias  Skin: Negative  Allergic/Immunologic: Negative  Neurological: Negative for seizures and syncope  Hematological: Negative for adenopathy  Psychiatric/Behavioral: Negative  Objective:    /74 (BP Location: Left arm, Patient Position: Sitting)   Pulse 87   Temp 97 6 °F (36 4 °C) (Tympanic)   Ht 6' 1" (1 854 m)   Wt 116 kg (256 lb)   SpO2 99%   BMI 33 78 kg/m²      Physical Exam  Vitals and nursing note reviewed  Constitutional:       General: He is not in acute distress  Appearance: Normal appearance  He is well-developed  He is not ill-appearing, toxic-appearing or diaphoretic  HENT:      Head: Normocephalic and atraumatic  Right Ear: Tympanic membrane, ear canal and external ear normal  There is no impacted cerumen  Left Ear: Tympanic membrane, ear canal and external ear normal  There is no impacted cerumen  Nose: Nose normal  No congestion or rhinorrhea  Mouth/Throat:      Mouth: Mucous membranes are moist       Pharynx: Oropharynx is clear  No oropharyngeal exudate or posterior oropharyngeal erythema  Eyes:      General: No scleral icterus  Right eye: No discharge  Left eye: No discharge  Extraocular Movements: Extraocular movements intact  Conjunctiva/sclera: Conjunctivae normal       Pupils: Pupils are equal, round, and reactive to light  Cardiovascular:      Rate and Rhythm: Normal rate and regular rhythm  Pulses: Normal pulses  Heart sounds: Normal heart sounds  No murmur heard  No friction rub  No gallop  Pulmonary:      Effort: Pulmonary effort is normal  No respiratory distress  Breath sounds: Normal breath sounds  No wheezing, rhonchi or rales  Abdominal:      General: Bowel sounds are normal  There is no distension  Palpations: Abdomen is soft  There is no mass  Tenderness: There is no abdominal tenderness  There is no guarding or rebound     Musculoskeletal: General: No swelling or deformity  Normal range of motion  Cervical back: Normal range of motion and neck supple  No rigidity  Right lower leg: No edema  Left lower leg: No edema  Lymphadenopathy:      Cervical: No cervical adenopathy  Skin:     General: Skin is warm and dry  Findings: No rash  Neurological:      General: No focal deficit present  Mental Status: He is alert and oriented to person, place, and time  Sensory: No sensory deficit  Motor: No weakness  Coordination: Coordination normal       Gait: Gait normal       Deep Tendon Reflexes: Reflexes are normal and symmetric  Reflexes normal    Psychiatric:         Mood and Affect: Mood normal          Behavior: Behavior normal          Thought Content:  Thought content normal          Judgment: Judgment normal

## 2022-05-03 ENCOUNTER — HOSPITAL ENCOUNTER (OUTPATIENT)
Dept: ULTRASOUND IMAGING | Facility: HOSPITAL | Age: 52
Discharge: HOME | End: 2022-05-03
Payer: COMMERCIAL

## 2022-05-03 DIAGNOSIS — N50.89 TESTICULAR MICROLITHIASIS: ICD-10-CM

## 2022-05-03 PROCEDURE — 76870 US EXAM SCROTUM: CPT

## 2022-05-31 DIAGNOSIS — Z86.718 HISTORY OF DVT (DEEP VEIN THROMBOSIS): ICD-10-CM

## 2022-06-01 RX ORDER — RIVAROXABAN 10 MG/1
TABLET, FILM COATED ORAL
Qty: 30 TABLET | Refills: 0 | Status: SHIPPED | OUTPATIENT
Start: 2022-06-01 | End: 2022-07-05

## 2022-07-05 DIAGNOSIS — Z86.718 HISTORY OF DVT (DEEP VEIN THROMBOSIS): ICD-10-CM

## 2022-07-05 RX ORDER — RIVAROXABAN 10 MG/1
TABLET, FILM COATED ORAL
Qty: 30 TABLET | Refills: 0 | Status: SHIPPED | OUTPATIENT
Start: 2022-07-05 | End: 2022-07-05 | Stop reason: SDUPTHER

## 2022-08-01 DIAGNOSIS — Z86.718 HISTORY OF DVT (DEEP VEIN THROMBOSIS): ICD-10-CM

## 2022-08-01 RX ORDER — RIVAROXABAN 10 MG/1
TABLET, FILM COATED ORAL
Qty: 30 TABLET | Refills: 0 | Status: SHIPPED | OUTPATIENT
Start: 2022-08-01 | End: 2022-08-31

## 2022-08-22 ENCOUNTER — HOSPITAL ENCOUNTER (EMERGENCY)
Facility: HOSPITAL | Age: 52
Discharge: HOME | End: 2022-08-22
Attending: FAMILY MEDICINE
Payer: COMMERCIAL

## 2022-08-22 ENCOUNTER — APPOINTMENT (EMERGENCY)
Dept: NON INVASIVE DIAGNOSTICS | Facility: HOSPITAL | Age: 52
End: 2022-08-22
Payer: COMMERCIAL

## 2022-08-22 VITALS
DIASTOLIC BLOOD PRESSURE: 67 MMHG | HEART RATE: 87 BPM | RESPIRATION RATE: 18 BRPM | OXYGEN SATURATION: 95 % | TEMPERATURE: 97.9 F | SYSTOLIC BLOOD PRESSURE: 127 MMHG

## 2022-08-22 DIAGNOSIS — M79.669 CALF PAIN: Primary | ICD-10-CM

## 2022-08-22 DIAGNOSIS — E83.42 HYPOMAGNESEMIA: ICD-10-CM

## 2022-08-22 LAB
CK SERPL-CCNC: 42 U/L (ref 39–308)
MAGNESIUM SERPL-MCNC: 1.8 MG/DL (ref 1.9–2.7)

## 2022-08-22 PROCEDURE — 36415 COLL VENOUS BLD VENIPUNCTURE: CPT | Performed by: EMERGENCY MEDICINE

## 2022-08-22 PROCEDURE — 82550 ASSAY OF CK (CPK): CPT | Performed by: EMERGENCY MEDICINE

## 2022-08-22 PROCEDURE — 93971 EXTREMITY STUDY: CPT

## 2022-08-22 PROCEDURE — 99284 EMERGENCY DEPT VISIT MOD MDM: CPT

## 2022-08-22 PROCEDURE — 99282 EMERGENCY DEPT VISIT SF MDM: CPT | Performed by: EMERGENCY MEDICINE

## 2022-08-22 PROCEDURE — 83735 ASSAY OF MAGNESIUM: CPT | Performed by: EMERGENCY MEDICINE

## 2022-08-22 PROCEDURE — 93971 EXTREMITY STUDY: CPT | Performed by: SURGERY

## 2022-08-22 RX ADMIN — MAGNESIUM OXIDE TAB 400 MG (241.3 MG ELEMENTAL MG) 400 MG: 400 (241.3 MG) TAB at 12:47

## 2022-08-22 NOTE — ED PROVIDER NOTES
History  Chief Complaint   Patient presents with    Leg Pain     Patient presents with reports of left calf cramping since last evening  Patient reports he felt this way the last time he had a blood clot      HPI      This is a very pleasant, nontoxic, 49-year-old gentleman presents the emergency department with a 12 hour history of left calf pain  Prior DVTs x3, to the right leg, 1 in the left leg  On Xarelto 10 mg daily  Patient has a past medical history of BPH, DVTs as dictated above, hypertension  Patient denies shortness of breath, chest pain, syncope, palpitations, exertional chest pain or shortness of breath  No recent car rides greater than 1 hour  Patient is not on any cholesterol medications  Prior to Admission Medications   Prescriptions Last Dose Informant Patient Reported? Taking?    Clenpiq 10-3 5-12 MG-GM -GM/160ML SOLN   Yes No   Patient not taking: Reported on 2022    Omega-3 1000 MG CAPS  Self Yes No   Sig: Take by mouth   Red Yeast Rice 600 MG TABS  Self Yes No   Sig: Take by mouth   Xarelto 10 MG tablet   No No   Sig: Take 1 tablet by mouth once daily   levocetirizine (XYZAL) 5 MG tablet  Self No No   Sig: Take 1 tablet (5 mg total) by mouth every evening   lisinopril (ZESTRIL) 10 mg tablet   No No   Sig: Take 1 tablet (10 mg total) by mouth daily   predniSONE 10 mg tablet   No No   Si pills for 3 days, then 3 pills for 3 days, then 2 pills for 3 days, then 1 pills for 3 days, then stop   Patient not taking: Reported on 3/17/2022       Facility-Administered Medications: None       Past Medical History:   Diagnosis Date    BPH with obstruction/lower urinary tract symptoms     COVID-19 2021    Deep vein thrombosis (DVT) (HCC)     Erectile dysfunction due to arterial insufficiency     Hypertension     No known health problems     history of denial of any significant medical , no pertinent past medical history per allscripts    Testicular hypogonadism     Varicocele Past Surgical History:   Procedure Laterality Date    EYE SURGERY      INGUINAL HERNIA REPAIR      UMBILICAL HERNIA REPAIR      per allscripts    VASECTOMY      VEIN LIGATION AND STRIPPING         Family History   Problem Relation Age of Onset    Heart disease Mother     Heart attack Mother     Breast cancer Mother     Hypertension Mother     Cerebral aneurysm Mother     Prostate cancer Father     Bone cancer Father     Diabetes type II Father     Testicular cancer Cousin     Coronary artery disease Maternal Grandmother     Colon cancer Paternal Grandmother     No Known Problems Son     No Known Problems Son      I have reviewed and agree with the history as documented  E-Cigarette/Vaping    E-Cigarette Use Never User      E-Cigarette/Vaping Substances    Nicotine No     THC No     CBD No     Flavoring No     Other No     Unknown No      Social History     Tobacco Use    Smoking status: Never Smoker    Smokeless tobacco: Never Used   Vaping Use    Vaping Use: Never used   Substance Use Topics    Alcohol use: Yes     Comment: occ    Drug use: No       Review of Systems   Constitutional: Negative  HENT: Negative  Eyes: Negative  Respiratory: Negative  Negative for chest tightness and shortness of breath  Cardiovascular: Negative  Negative for chest pain and palpitations  Gastrointestinal: Negative  Endocrine: Negative  Genitourinary: Negative  Musculoskeletal: Negative  L calf pain  Skin: Negative for color change, pallor, rash and wound  Allergic/Immunologic: Negative  Neurological: Negative  Psychiatric/Behavioral: Negative  Physical Exam  Physical Exam  Vitals and nursing note reviewed  Constitutional:       Appearance: Normal appearance  He is normal weight  HENT:      Head: Normocephalic and atraumatic        Right Ear: External ear normal       Left Ear: External ear normal       Nose: Nose normal       Mouth/Throat: Mouth: Mucous membranes are moist       Pharynx: Oropharynx is clear  Eyes:      Extraocular Movements: Extraocular movements intact  Conjunctiva/sclera: Conjunctivae normal       Pupils: Pupils are equal, round, and reactive to light  Cardiovascular:      Rate and Rhythm: Normal rate and regular rhythm  Pulses: Normal pulses  Heart sounds: Normal heart sounds  Pulmonary:      Effort: Pulmonary effort is normal       Breath sounds: Normal breath sounds  Abdominal:      General: Abdomen is flat  Bowel sounds are normal       Palpations: Abdomen is soft  Musculoskeletal:         General: Swelling and tenderness present  Cervical back: Normal range of motion  Skin:     General: Skin is warm  Capillary Refill: Capillary refill takes less than 2 seconds  Neurological:      General: No focal deficit present  Mental Status: He is alert and oriented to person, place, and time  Mental status is at baseline  Psychiatric:         Mood and Affect: Mood normal          Behavior: Behavior normal          Thought Content:  Thought content normal          Judgment: Judgment normal          Vital Signs  ED Triage Vitals [08/22/22 1153]   Temperature Pulse Respirations Blood Pressure SpO2   97 9 °F (36 6 °C) 87 18 127/67 95 %      Temp Source Heart Rate Source Patient Position - Orthostatic VS BP Location FiO2 (%)   Tympanic -- Sitting Right arm --      Pain Score       No Pain           Vitals:    08/22/22 1153   BP: 127/67   Pulse: 87   Patient Position - Orthostatic VS: Sitting         Visual Acuity      ED Medications  Medications   magnesium oxide (MAG-OX) tablet 400 mg (400 mg Oral Given 8/22/22 1247)       Diagnostic Studies  Results Reviewed     Procedure Component Value Units Date/Time    Magnesium [[id number]]  (Abnormal) Collected: 08/22/22 1216    Lab Status: Final result Specimen: Blood from Arm, Left Updated: 08/22/22 1235     Magnesium 1 8 mg/dL     CK (with reflex to MB) [[id number]]  (Normal) Collected: 08/22/22 1216    Lab Status: Final result Specimen: Blood from Arm, Left Updated: 08/22/22 1235     Total CK 42 U/L                  VAS lower limb venous duplex study, unilateral/limited    (Results Pending)              Procedures  Procedures         ED Course  ED Course as of 08/22/22 1300   Mon Aug 22, 2022   1241 As per [facility], vascular technician, DVT study is negative  Patient's CK level is 42 with a magnesium of 1 8 marginally decreased, will replace orally  MDM  Number of Diagnoses or Management Options  Calf pain  Hypomagnesemia  Diagnosis management comments: This is a very pleasant, nontoxic, 68-year-old gentleman presents the emergency department longstanding history of multiple lower extremity DVTs on Xarelto, left calf pain times 12 hours, magnesium level marginally increased will replace the bedside, CK levels normal, ultrasound of the left lower extremity shows no DVT, patient stable for discharge, no chest pain, no shortness of breath, exertional symptoms, no palpitations the current any further workup  Patient stable at discharge  Portions of the record may have been created with voice recognition software  Occasional wrong word or "sound a like" substitutions may have occurred due to the inherent limitations of voice recognition software  Read the chart carefully and recognize, using context, where substitutions have occurred  Counseling: I had a detailed discussion with the patient and/or guardian regarding: the historical points, exam findings, and any diagnostic results supporting the discharge diagnosis, lab results, radiology results, discharge instructions reviewed with patient and/or family/caregiver and understanding was verbalized   Instructions given to return to the emergency department if symptoms worsen or persist, or if there are any questions or concerns that arise at home      This patient was examined during the Covid-19 pandemic, and appropriate PPE was employed as defined by OSHA to minimize exposure to the patient and to avoid spread in the event that I am an asymptomatic carrier  All efforts were made to avoid direct contact with the patient per CDC guidelines ("social distancing") unless otherwise necessary to rule out a medical emergency and/or to provide life-saving interventions  Donning and doffing of PPE was performed per recommended guidelines, and personal PPE was employed if /when institutional PPE was not readily available or was deemed to be less than the recommended as defined by OSHA  Amount and/or Complexity of Data Reviewed  Clinical lab tests: reviewed and ordered  Tests in the radiology section of CPT®: ordered and reviewed  Tests in the medicine section of CPT®: ordered and reviewed  Decide to obtain previous medical records or to obtain history from someone other than the patient: yes  Review and summarize past medical records: yes  Independent visualization of images, tracings, or specimens: yes        Disposition  Final diagnoses:   Calf pain   Hypomagnesemia     Time reflects when diagnosis was documented in both MDM as applicable and the Disposition within this note     Time User Action Codes Description Comment    8/22/2022 12:42 PM Mainor Arts Add [J32 028] Calf pain     8/22/2022 12:42 PM Mainor Arts Add [E83 42] Hypomagnesemia       ED Disposition     ED Disposition   Discharge    Condition   Stable    Date/Time   Mon Aug 22, 2022 12:42 PM    [address] discharge to home/self care  Follow-up Information     Follow up With Specialties Details Why Contact Info    Jose Marquez DO Family Medicine   [address] [place] [address]  [phone]            Discharge Medication List as of 8/22/2022 12:43 PM      CONTINUE these medications which have NOT CHANGED    Details   Clenpiq 10-3 5-12 MG-GM -GM/160ML SOLN Starting Mon 4/11/2022, Historical Med      levocetirizine (XYZAL) 5 MG tablet Take 1 tablet (5 mg total) by mouth every evening, Starting Thu 5/13/2021, Normal      lisinopril (ZESTRIL) 10 mg tablet Take 1 tablet (10 mg total) by mouth daily, Starting Mon 10/18/2021, Normal      Omega-3 1000 MG CAPS Take by mouth, Historical Med      predniSONE 10 mg tablet 4 pills for 3 days, then 3 pills for 3 days, then 2 pills for 3 days, then 1 pills for 3 days, then stop, Normal      Red Yeast Rice 600 MG TABS Take by mouth, Historical Med      Xarelto 10 MG tablet Take 1 tablet by mouth once daily, Normal             No discharge procedures on file      PDMP Review     None          ED Provider  Electronically Signed by           Carmelo Crowell III, DO  08/22/22 1300

## 2022-08-23 ENCOUNTER — OFFICE VISIT (OUTPATIENT)
Dept: FAMILY MEDICINE CLINIC | Facility: CLINIC | Age: 52
End: 2022-08-23
Payer: COMMERCIAL

## 2022-08-23 VITALS
HEIGHT: 73 IN | SYSTOLIC BLOOD PRESSURE: 112 MMHG | OXYGEN SATURATION: 97 % | TEMPERATURE: 96.8 F | DIASTOLIC BLOOD PRESSURE: 74 MMHG | HEART RATE: 68 BPM | WEIGHT: 248 LBS | BODY MASS INDEX: 32.87 KG/M2

## 2022-08-23 DIAGNOSIS — Z79.01 CHRONIC ANTICOAGULATION: ICD-10-CM

## 2022-08-23 DIAGNOSIS — M75.41 IMPINGEMENT SYNDROME OF RIGHT SHOULDER: ICD-10-CM

## 2022-08-23 DIAGNOSIS — M75.111 NONTRAUMATIC INCOMPLETE TEAR OF RIGHT ROTATOR CUFF: Primary | ICD-10-CM

## 2022-08-23 PROCEDURE — 99214 OFFICE O/P EST MOD 30 MIN: CPT

## 2022-08-23 PROCEDURE — 3074F SYST BP LT 130 MM HG: CPT

## 2022-08-23 PROCEDURE — 3725F SCREEN DEPRESSION PERFORMED: CPT

## 2022-08-23 PROCEDURE — 3078F DIAST BP <80 MM HG: CPT

## 2022-08-23 RX ORDER — PREDNISONE 10 MG/1
10 TABLET ORAL DAILY
Qty: 30 TABLET | Refills: 0 | Status: SHIPPED | OUTPATIENT
Start: 2022-08-23 | End: 2022-09-22

## 2022-08-23 NOTE — PROGRESS NOTES
Assessment/Plan:  64-year-old male presents to the office today with concerns regarding infraspinatus tendon tear on top of capsulitis  Prednisone taper prescribed  Follow-up in 4 weeks accordingly  No problem-specific Assessment & Plan notes found for this encounter  Diagnoses and all orders for this visit:    Nontraumatic incomplete tear of right rotator cuff  Comments:  Espinoza Rogelaris test positive  Infraspinatus partial tear  Follow-up in 4 weeks  Prednisone taper ordered  Patient refused physical therapy  Orders:  -     predniSONE 10 mg tablet; Take 1 tablet (10 mg total) by mouth daily for 30 doses Take 4 pills for 3 days , 3 pills for 3 days, 2 pills for 3 days, 1 pill for 3 days  Impingement syndrome of right shoulder  Comments:  Positive impingement test   History of capsulitis  Refuses physical therapy  Encouraged to practice stretching exercise  Follow-up in 4 weeks    Chronic anticoagulation  Comments:  History of multiple DVTs  Currently on rivaroxaban  Patient cannot take ibuprofen  Started on prednisone taper  PHQ-2/9 Depression Screening    Little interest or pleasure in doing things: 0 - not at all  Feeling down, depressed, or hopeless: 0 - not at all  PHQ-2 Score: 0  PHQ-2 Interpretation: Negative depression screen            Subjective:      Patient ID: Shital Polk is a 46 y o  male  45 yo with a PMh of HTN, and history of multiple DVT ( on xeralto) presented to the office today c/o RT shoulder pain for 1 day    lateral shoulder area, sharp pain, 3-7SS, radiating to his hand, aggravated with movement, alleviated with rest,   No associated weakness or numbness  tx tried: none   No previous trauma or falls within the past few weeks  Profession:    No heavy lifting   Previous history of similar pain on the LT side; calcific bursitis      The following portions of the patient's history were reviewed and updated as appropriate: allergies, current medications, past family history and problem list     Review of Systems   Constitutional: Negative for activity change, appetite change, chills, fatigue, fever and unexpected weight change  HENT: Negative for congestion, sinus pressure, sneezing and sore throat  Respiratory: Negative for apnea, cough, chest tightness, shortness of breath and wheezing  Cardiovascular: Negative for chest pain, palpitations and leg swelling  Gastrointestinal: Negative for abdominal pain, constipation, diarrhea, nausea and vomiting  Genitourinary: Negative for dysuria, flank pain and frequency  Musculoskeletal: Positive for arthralgias  Negative for back pain, gait problem, joint swelling, myalgias and neck pain  Neurological: Negative for dizziness, light-headedness and headaches  Objective:    /74 (BP Location: Left arm, Patient Position: Sitting, Cuff Size: Large)   Pulse 68   Temp (!) 96 8 °F (36 °C) (Tympanic)   Ht 6' 1" (1 854 m)   Wt 112 kg (248 lb)   SpO2 97%   BMI 32 72 kg/m²      Physical Exam  Constitutional:       General: He is not in acute distress  Appearance: Normal appearance  He is not ill-appearing or toxic-appearing  HENT:      Head: Normocephalic and atraumatic  Cardiovascular:      Rate and Rhythm: Normal rate and regular rhythm  Pulses: Normal pulses  Heart sounds: Normal heart sounds  No murmur heard  Pulmonary:      Effort: Pulmonary effort is normal  No respiratory distress  Breath sounds: Normal breath sounds  No wheezing or rales  Musculoskeletal:      Right shoulder: Normal       Left shoulder: Normal       Right upper arm: Normal       Left upper arm: Normal       Cervical back: Normal range of motion and neck supple  No rigidity or tenderness  Comments: Intact passive and active range of motion with minimal limitations on shoulder flexion   Positive how Velvet Sauger and impingement test on the right side    Negative empty can, lift-off tests        Skin:     General: Skin is warm  Capillary Refill: Capillary refill takes less than 2 seconds  Neurological:      General: No focal deficit present  Mental Status: He is alert  Mental status is at baseline  Motor: Motor function is intact  Coordination: Coordination is intact  Gait: Gait is intact  Deep Tendon Reflexes:      Reflex Scores:       Bicep reflexes are 2+ on the right side and 2+ on the left side

## 2022-08-31 DIAGNOSIS — Z86.718 HISTORY OF DVT (DEEP VEIN THROMBOSIS): ICD-10-CM

## 2022-08-31 RX ORDER — RIVAROXABAN 10 MG/1
TABLET, FILM COATED ORAL
Qty: 30 TABLET | Refills: 0 | Status: SHIPPED | OUTPATIENT
Start: 2022-08-31 | End: 2022-09-30

## 2022-09-20 ENCOUNTER — OFFICE VISIT (OUTPATIENT)
Dept: FAMILY MEDICINE CLINIC | Facility: CLINIC | Age: 52
End: 2022-09-20
Payer: COMMERCIAL

## 2022-09-20 VITALS
TEMPERATURE: 96.4 F | OXYGEN SATURATION: 95 % | DIASTOLIC BLOOD PRESSURE: 92 MMHG | HEART RATE: 71 BPM | WEIGHT: 254 LBS | BODY MASS INDEX: 33.66 KG/M2 | SYSTOLIC BLOOD PRESSURE: 130 MMHG | HEIGHT: 73 IN

## 2022-09-20 DIAGNOSIS — I10 PRIMARY HYPERTENSION: ICD-10-CM

## 2022-09-20 DIAGNOSIS — H60.93 OTITIS EXTERNA OF BOTH EARS, UNSPECIFIED CHRONICITY, UNSPECIFIED TYPE: ICD-10-CM

## 2022-09-20 DIAGNOSIS — M25.511 RIGHT SHOULDER PAIN, UNSPECIFIED CHRONICITY: Primary | ICD-10-CM

## 2022-09-20 PROCEDURE — 99214 OFFICE O/P EST MOD 30 MIN: CPT | Performed by: FAMILY MEDICINE

## 2022-09-20 PROCEDURE — 3075F SYST BP GE 130 - 139MM HG: CPT | Performed by: FAMILY MEDICINE

## 2022-09-20 PROCEDURE — 3080F DIAST BP >= 90 MM HG: CPT | Performed by: FAMILY MEDICINE

## 2022-09-20 PROCEDURE — 3725F SCREEN DEPRESSION PERFORMED: CPT | Performed by: FAMILY MEDICINE

## 2022-09-20 RX ORDER — CIPROFLOXACIN AND DEXAMETHASONE 3; 1 MG/ML; MG/ML
4 SUSPENSION/ DROPS AURICULAR (OTIC) 2 TIMES DAILY
Qty: 7.5 ML | Refills: 0 | Status: SHIPPED | OUTPATIENT
Start: 2022-09-20 | End: 2022-09-25

## 2022-09-20 NOTE — PROGRESS NOTES
Assessment/Plan:   25-year-old male with a past medical history remarkable for right shoulder impingement and partial rotator cuff tear; traumatic  He was seen 4 weeks ago for this reason in the office and was prescribed a prednisone course  Today he was seen for follow-up and reports complete resolution of symptoms  At this time no further labs or imaging studies are needed  Patient will be seen next on 10/24/2022  No problem-specific Assessment & Plan notes found for this encounter  Diagnoses and all orders for this visit:    Right shoulder pain, unspecified chronicity  Comments:  Four-week follow-up today  Complete resolution of symptoms  Negative physical exam  Routine follow-up    Otitis externa of both ears, unspecified chronicity, unspecified type  -     ciprofloxacin-dexamethasone (CIPRODEX) otic suspension; Administer 4 drops into both ears 2 (two) times a day for 5 days    Primary hypertension  Comments:  Stable  Continue same management          PHQ-2/9 Depression Screening    Little interest or pleasure in doing things: 0 - not at all  Feeling down, depressed, or hopeless: 0 - not at all  PHQ-2 Score: 0  PHQ-2 Interpretation: Negative depression screen            Subjective:      Patient ID: Bre Lemus is a 45 y o  male  45 yo with a PMh of HTN, and history of multiple DVT ( on xeralto) presented to the office today regarding 1 month history of right shoulder pain  He presented today for 4 week follow-up and to be re-evaluated  The patient reports complete resolution of the right shoulder pain after starting prednisone taper course 4 weeks ago  Today he does not have any shoulder pain, weakness or numbness  His activity is at normal baseline state  No active complaints today  No nausea or vomiting, chest pain or tightness, fevers or chills, abdominal pain or distention, diarrhea constipation  No urinary symptoms        Of note; patient presented 4 weeks ago due to posttraumatic right shoulder pain  Impingement test, lift-off test, Gomez and Neer's test were positive  Prednisone taper course was ordered at that time  No imaging studies were done  The following portions of the patient's history were reviewed and updated as appropriate: allergies, past medical history and problem list     Review of Systems   Constitutional: Negative  Negative for chills, fatigue and fever  HENT: Negative  Negative for hearing loss  Eyes: Negative  Negative for visual disturbance  Respiratory: Negative for shortness of breath and wheezing  Cardiovascular: Negative for chest pain and palpitations  Gastrointestinal: Negative for abdominal pain, blood in stool, constipation, diarrhea, nausea and vomiting  Endocrine: Negative  Genitourinary: Negative for difficulty urinating and dysuria  Musculoskeletal: Negative for arthralgias and myalgias  Skin: Negative  Allergic/Immunologic: Negative  Neurological: Negative for seizures and syncope  Hematological: Negative for adenopathy  Psychiatric/Behavioral: Negative  Objective:    /92   Pulse 71   Temp (!) 96 4 °F (35 8 °C)   Ht 6' 1" (1 854 m)   Wt 115 kg (254 lb)   SpO2 95%   BMI 33 51 kg/m²      Physical Exam  Vitals and nursing note reviewed  Constitutional:       General: He is awake  Appearance: Normal appearance  HENT:      Head: Normocephalic and atraumatic  Right Ear: Hearing, tympanic membrane and external ear normal       Left Ear: Hearing, tympanic membrane and external ear normal       Ears:      Comments: Erythematous ear canal noted bilaterally      Nose: Nose normal       Mouth/Throat:      Mouth: Mucous membranes are moist       Pharynx: Oropharynx is clear  Eyes:      General: No scleral icterus  Conjunctiva/sclera: Conjunctivae normal       Pupils: Pupils are equal, round, and reactive to light     Cardiovascular:      Rate and Rhythm: Normal rate and regular rhythm  Pulses: Normal pulses  Radial pulses are 2+ on the right side and 2+ on the left side  Heart sounds: Normal heart sounds, S1 normal and S2 normal  No murmur heard  No friction rub  No gallop  Pulmonary:      Effort: Pulmonary effort is normal  No respiratory distress  Breath sounds: Normal breath sounds and air entry  No stridor  No wheezing, rhonchi or rales  Chest:   Breasts:      Right: No axillary adenopathy  Left: No axillary adenopathy  Abdominal:      General: Bowel sounds are normal       Palpations: Abdomen is soft  There is no mass  Tenderness: There is no abdominal tenderness  There is no guarding  Musculoskeletal:      Right shoulder: Normal       Left shoulder: Normal       Right lower leg: No edema  Left lower leg: No edema  Comments: Negative Neer's, Gomez, empty can, lift off tests  Lymphadenopathy:      Cervical: No cervical adenopathy  Upper Body:      Right upper body: No axillary adenopathy  Left upper body: No axillary adenopathy  Skin:     General: Skin is warm and dry  Neurological:      Mental Status: He is alert and oriented to person, place, and time  Mental status is at baseline     Psychiatric:         Mood and Affect: Mood normal

## 2022-09-30 DIAGNOSIS — Z86.718 HISTORY OF DVT (DEEP VEIN THROMBOSIS): ICD-10-CM

## 2022-09-30 RX ORDER — RIVAROXABAN 10 MG/1
TABLET, FILM COATED ORAL
Qty: 30 TABLET | Refills: 0 | Status: SHIPPED | OUTPATIENT
Start: 2022-09-30

## 2022-10-24 ENCOUNTER — OFFICE VISIT (OUTPATIENT)
Dept: FAMILY MEDICINE CLINIC | Facility: CLINIC | Age: 52
End: 2022-10-24
Payer: COMMERCIAL

## 2022-10-24 VITALS
SYSTOLIC BLOOD PRESSURE: 120 MMHG | WEIGHT: 257.4 LBS | HEIGHT: 73 IN | OXYGEN SATURATION: 98 % | BODY MASS INDEX: 34.11 KG/M2 | DIASTOLIC BLOOD PRESSURE: 72 MMHG | TEMPERATURE: 97.8 F | HEART RATE: 79 BPM

## 2022-10-24 DIAGNOSIS — E78.00 HYPERCHOLESTEROLEMIA: ICD-10-CM

## 2022-10-24 DIAGNOSIS — I10 BENIGN ESSENTIAL HYPERTENSION: Primary | ICD-10-CM

## 2022-10-24 DIAGNOSIS — Z12.5 SCREENING FOR PROSTATE CANCER: ICD-10-CM

## 2022-10-24 PROCEDURE — 99214 OFFICE O/P EST MOD 30 MIN: CPT | Performed by: FAMILY MEDICINE

## 2022-10-24 NOTE — PROGRESS NOTES
Assessment/Plan:    No problem-specific Assessment & Plan notes found for this encounter  Diagnoses and all orders for this visit:    Benign essential hypertension  -     CBC and differential; Future  -     TSH, 3rd generation with Free T4 reflex; Future    Hypercholesterolemia  -     Comprehensive metabolic panel; Future  -     Lipid panel; Future    Screening for prostate cancer  -     PSA, Total Screen; Future          PHQ-2/9 Depression Screening    Little interest or pleasure in doing things: 0 - not at all  Feeling down, depressed, or hopeless: 0 - not at all  PHQ-2 Score: 0  PHQ-2 Interpretation: Negative depression screen            Subjective:      Patient ID: Anne Ross is a 45 y o  male  Hypertension  This is a chronic problem  The current episode started more than 1 year ago  The problem is unchanged  The problem is controlled  Pertinent negatives include no chest pain, headaches or palpitations  There are no associated agents to hypertension  Risk factors for coronary artery disease include obesity, male gender and sedentary lifestyle  Past treatments include ACE inhibitors  Compliance problems include diet and exercise  The following portions of the patient's history were reviewed and updated as appropriate: allergies, current medications, past family history, past medical history, past social history, past surgical history and problem list     Review of Systems   Eyes: Negative for visual disturbance  Cardiovascular: Negative for chest pain and palpitations  Neurological: Negative for headaches  Objective:    /72 (BP Location: Left arm, Patient Position: Sitting)   Pulse 79   Temp 97 8 °F (36 6 °C) (Tympanic)   Ht 6' 1" (1 854 m)   Wt 117 kg (257 lb 6 4 oz)   SpO2 98%   BMI 33 96 kg/m²      Physical Exam  Vitals and nursing note reviewed  Constitutional:       General: He is not in acute distress  Appearance: Normal appearance   He is not ill-appearing, toxic-appearing or diaphoretic  HENT:      Head: Normocephalic and atraumatic  Eyes:      Conjunctiva/sclera: Conjunctivae normal    Cardiovascular:      Rate and Rhythm: Normal rate and regular rhythm  Heart sounds: Normal heart sounds  No murmur heard  Pulmonary:      Effort: Pulmonary effort is normal  No respiratory distress  Breath sounds: Normal breath sounds  No wheezing, rhonchi or rales  Abdominal:      General: There is no distension  Palpations: Abdomen is soft  There is no mass  Tenderness: There is no abdominal tenderness  There is no guarding or rebound  Musculoskeletal:      Cervical back: Normal range of motion and neck supple  No rigidity  Right lower leg: No edema  Left lower leg: No edema  Lymphadenopathy:      Cervical: No cervical adenopathy  Neurological:      Mental Status: He is alert and oriented to person, place, and time  Psychiatric:         Mood and Affect: Mood normal          Behavior: Behavior normal          Thought Content:  Thought content normal          Judgment: Judgment normal

## 2022-11-09 ENCOUNTER — OFFICE VISIT (OUTPATIENT)
Dept: FAMILY MEDICINE CLINIC | Facility: CLINIC | Age: 52
End: 2022-11-09

## 2022-11-09 VITALS
TEMPERATURE: 96.9 F | HEART RATE: 80 BPM | DIASTOLIC BLOOD PRESSURE: 68 MMHG | SYSTOLIC BLOOD PRESSURE: 112 MMHG | WEIGHT: 250 LBS | HEIGHT: 73 IN | OXYGEN SATURATION: 99 % | BODY MASS INDEX: 33.13 KG/M2

## 2022-11-09 DIAGNOSIS — M25.511 ACUTE PAIN OF RIGHT SHOULDER: Primary | ICD-10-CM

## 2022-11-09 RX ORDER — PREDNISONE 10 MG/1
TABLET ORAL
Qty: 30 TABLET | Refills: 0 | Status: SHIPPED | OUTPATIENT
Start: 2022-11-09

## 2022-11-09 NOTE — PROGRESS NOTES
Name: Vilma Judd      : 1970      MRN: [id number]  Encounter Provider: Damon Costello MD  Encounter Date: 2022   Encounter department: [facility] [address] [place] [address]  Acute pain of right shoulder  -     XR shoulder 2+ vw right; Future; Expected date: 2022  -     predniSONE 10 mg tablet; 4 pills for 3 days, then 3 pills for 3 days, then 2 pills for 3 days, then 1 pill for 3 days, then stop  Will trial a tapering dose of steroids and send for XR of shoulder  Will consider PT after Xrays and RTC in 2 weeks for f/u  Subjective      Patient is a 46 y/o M presenting to the office today for Right shoulder pain that woke him up from sleep yesterday afternoon  Patient states the pain is a dull ache that is located in the shoulder area and does not radiate to the mid arm or to the fingertips and is not associated with numbness or tingling  Patient does not note any recent injury or falls and is a 5/10 in severity, with no relief with Tylenol  As patient is on blood thinners, did not try NSAIDS  Arm Pain   The incident occurred 6 to 12 hours ago  The incident occurred at home  The pain is present in the right shoulder  The quality of the pain is described as aching  The pain is at a severity of 5/10  The pain is mild  Pertinent negatives include no chest pain, numbness or tingling  He has tried heat for the symptoms  The treatment provided mild relief  Review of Systems   Constitutional: Negative for chills and fever  HENT: Negative for ear pain, sinus pressure and sore throat  Eyes: Negative for pain and visual disturbance  Respiratory: Negative for cough, shortness of breath and wheezing  Cardiovascular: Negative for chest pain and palpitations  Gastrointestinal: Negative for abdominal pain, blood in stool, constipation, diarrhea and vomiting  Genitourinary: Negative for dysuria and hematuria     Musculoskeletal: Positive for arthralgias and back pain  Right shoulder pain   Skin: Negative for color change and rash  Neurological: Negative for dizziness, tingling, seizures, syncope, numbness and headaches  Current Outpatient Medications on File Prior to Visit   Medication Sig   • lisinopril (ZESTRIL) 10 mg tablet Take 1 tablet (10 mg total) by mouth daily   • Omega-3 1000 MG CAPS Take by mouth in the morning   • Red Yeast Rice 600 MG TABS Take by mouth in the morning   • Xarelto 10 MG tablet Take 1 tablet by mouth once daily   • ciprofloxacin-dexamethasone (CIPRODEX) otic suspension Administer 4 drops into both ears 2 (two) times a day for 5 days (Patient not taking: No sig reported)   • levocetirizine (XYZAL) 5 MG tablet Take 1 tablet (5 mg total) by mouth every evening (Patient not taking: No sig reported)   • [DISCONTINUED] aspirin 325 mg tablet Take 1 tablet by mouth       Objective     /68 (BP Location: Left arm, Patient Position: Sitting, Cuff Size: Large)   Pulse 80   Temp (!) 96 9 °F (36 1 °C) (Tympanic)   Ht 6' 1" (1 854 m)   Wt 113 kg (250 lb)   SpO2 99%   BMI 32 98 kg/m²     Physical Exam  Vitals reviewed  Constitutional:       General: He is not in acute distress  Appearance: Normal appearance  HENT:      Head: Normocephalic  Nose: Nose normal    Eyes:      General: No scleral icterus  Extraocular Movements: Extraocular movements intact  Pupils: Pupils are equal, round, and reactive to light  Cardiovascular:      Rate and Rhythm: Normal rate and regular rhythm  Pulses: Normal pulses  Heart sounds: Normal heart sounds  No murmur heard  Pulmonary:      Effort: Pulmonary effort is normal  No respiratory distress  Breath sounds: Normal breath sounds  No wheezing  Abdominal:      Palpations: Abdomen is soft  Musculoskeletal:         General: No swelling  Right lower leg: No edema  Left lower leg: No edema  Comments: Abnormal ROM     Pain with internal rotation and adduction of right shoulder  Skin:     General: Skin is warm  Coloration: Skin is not jaundiced  Neurological:      General: No focal deficit present  Mental Status: He is alert and oriented to person, place, and time  Psychiatric:         Mood and Affect: Mood normal          Behavior: Behavior normal          Thought Content:  Thought content normal          Judgment: Judgment normal        Samantha Carlin MD

## 2022-11-10 ENCOUNTER — APPOINTMENT (OUTPATIENT)
Dept: RADIOLOGY | Facility: CLINIC | Age: 52
End: 2022-11-10

## 2022-11-10 DIAGNOSIS — M25.511 ACUTE PAIN OF RIGHT SHOULDER: ICD-10-CM

## 2022-11-13 ENCOUNTER — HOSPITAL ENCOUNTER (EMERGENCY)
Facility: HOSPITAL | Age: 52
Discharge: HOME | End: 2022-11-13
Attending: EMERGENCY MEDICINE

## 2022-11-13 VITALS
TEMPERATURE: 97.6 F | HEART RATE: 85 BPM | OXYGEN SATURATION: 93 % | DIASTOLIC BLOOD PRESSURE: 78 MMHG | SYSTOLIC BLOOD PRESSURE: 135 MMHG | RESPIRATION RATE: 18 BRPM

## 2022-11-13 DIAGNOSIS — M65.20 CALCIFIC TENDINITIS: Primary | ICD-10-CM

## 2022-11-13 DIAGNOSIS — M25.511 RIGHT SHOULDER PAIN: ICD-10-CM

## 2022-11-13 RX ORDER — OXYCODONE HYDROCHLORIDE 5 MG/1
5 TABLET ORAL ONCE
Status: COMPLETED | OUTPATIENT
Start: 2022-11-13 | End: 2022-11-13

## 2022-11-13 RX ORDER — ACETAMINOPHEN 325 MG/1
650 TABLET ORAL ONCE
Status: COMPLETED | OUTPATIENT
Start: 2022-11-13 | End: 2022-11-13

## 2022-11-13 RX ORDER — KETOROLAC TROMETHAMINE 30 MG/ML
30 INJECTION, SOLUTION INTRAMUSCULAR; INTRAVENOUS ONCE
Status: COMPLETED | OUTPATIENT
Start: 2022-11-13 | End: 2022-11-13

## 2022-11-13 RX ADMIN — OXYCODONE HYDROCHLORIDE 5 MG: 5 TABLET ORAL at 06:17

## 2022-11-13 RX ADMIN — KETOROLAC TROMETHAMINE 30 MG: 30 INJECTION, SOLUTION INTRAMUSCULAR at 06:19

## 2022-11-13 RX ADMIN — ACETAMINOPHEN 650 MG: 325 TABLET ORAL at 06:17

## 2022-11-13 NOTE — ED PROVIDER NOTES
History  Chief Complaint   Patient presents with   • Shoulder Pain     Pt reports he woke up on  with R shoulder pain, saw PCP where he had xrays done and was prescribed a steroid  No relief of pain which is now radiating down into his fingers     58-year-old male presents to the emergency department for evaluation of right shoulder pain  Patient reports that the pain started on  when he woke up with right shoulder pain  Was seen by his PCP who performed x-rays and he was prescribed a steroid that he has been taking  He states the pain persist and now radiates down into his fingers  Pain is made worse with movement  Range of motion is limited secondary to pain  He has been taking Percocet with temporary relief  Denies any trauma, falls, or known injuries  Denies any repetitive movements  No recent heavy lifting  Prior to Admission Medications   Prescriptions Last Dose Informant Patient Reported? Taking? Omega-3 1000 MG CAPS  Self Yes No   Sig: Take by mouth in the morning   Red Yeast Rice 600 MG TABS  Self Yes No   Sig: Take by mouth in the morning   Xarelto 10 MG tablet   No No   Sig: Take 1 tablet by mouth once daily   ciprofloxacin-dexamethasone (CIPRODEX) otic suspension   No No   Sig: Administer 4 drops into both ears 2 (two) times a day for 5 days   Patient not taking: No sig reported   levocetirizine (XYZAL) 5 MG tablet   No No   Sig: Take 1 tablet (5 mg total) by mouth every evening   Patient not taking: No sig reported   lisinopril (ZESTRIL) 10 mg tablet   No No   Sig: Take 1 tablet (10 mg total) by mouth daily   predniSONE 10 mg tablet   No No   Si pills for 3 days, then 3 pills for 3 days, then 2 pills for 3 days, then 1 pill for 3 days, then stop        Facility-Administered Medications: None       Past Medical History:   Diagnosis Date   • Allergic    • BPH with obstruction/lower urinary tract symptoms    • COVID-19 2021   • Deep vein thrombosis (DVT) (HCC)    • Erectile dysfunction due to arterial insufficiency    • Hypertension    • No known health problems     history of denial of any significant medical , no pertinent past medical history per allscripts   • Testicular hypogonadism    • Varicocele        Past Surgical History:   Procedure Laterality Date   • EYE SURGERY     • INGUINAL HERNIA REPAIR     • UMBILICAL HERNIA REPAIR      per allscripts   • VASECTOMY     • VEIN LIGATION AND STRIPPING         Family History   Problem Relation Age of Onset   • Heart disease Mother    • Heart attack Mother    • Breast cancer Mother    • Hypertension Mother    • Cerebral aneurysm Mother    • Prostate cancer Father    • Bone cancer Father    • Diabetes type II Father    • Testicular cancer Cousin    • Coronary artery disease Maternal Grandmother    • Colon cancer Paternal Grandmother    • No Known Problems Son    • No Known Problems Son      I have reviewed and agree with the history as documented  E-Cigarette/Vaping   • E-Cigarette Use Never User      E-Cigarette/Vaping Substances   • Nicotine No    • THC No    • CBD No    • Flavoring No    • Other No    • Unknown No      Social History     Tobacco Use   • Smoking status: Never Smoker   • Smokeless tobacco: Never Used   Vaping Use   • Vaping Use: Never used   Substance Use Topics   • Alcohol use: Yes     Comment: Occasionally   • Drug use: No       Review of Systems   Constitutional: Negative for chills and fever  HENT: Negative for ear pain and sore throat  Eyes: Negative for pain and visual disturbance  Respiratory: Negative for cough and shortness of breath  Cardiovascular: Negative for chest pain and palpitations  Gastrointestinal: Negative for abdominal pain and vomiting  Genitourinary: Negative for dysuria and hematuria  Musculoskeletal: Positive for arthralgias  Negative for back pain  Skin: Negative for color change and rash  Neurological: Negative for seizures and syncope     All other systems reviewed and are negative  Physical Exam  Physical Exam  Vitals and nursing note reviewed  HENT:      Head: Normocephalic and atraumatic  Right Ear: External ear normal       Left Ear: External ear normal       Nose: Nose normal       Mouth/Throat:      Mouth: Mucous membranes are moist    Eyes:      Extraocular Movements: Extraocular movements intact  Conjunctiva/sclera: Conjunctivae normal       Pupils: Pupils are equal, round, and reactive to light  Cardiovascular:      Rate and Rhythm: Normal rate and regular rhythm  Pulses: Normal pulses  Pulmonary:      Effort: Pulmonary effort is normal  No respiratory distress  Breath sounds: No stridor  Musculoskeletal:      Cervical back: Normal range of motion and neck supple  Comments: Range of motion of the right shoulder limited secondary to pain, patient has tenderness to palpation over the deltoid region  No AC joint tenderness, no clavicular tenderness  No obvious deformities  No tenderness in the elbow, wrist, or forearm  Full range of motion at the wrist and elbow, no neck tenderness   Skin:     General: Skin is warm and dry  Capillary Refill: Capillary refill takes less than 2 seconds  Neurological:      General: No focal deficit present  Mental Status: He is alert and oriented to person, place, and time  Sensory: No sensory deficit     Psychiatric:         Mood and Affect: Mood normal          Behavior: Behavior normal          Vital Signs  ED Triage Vitals [11/13/22 0539]   Temperature Pulse Respirations Blood Pressure SpO2   97 6 °F (36 4 °C) 85 18 135/78 93 %      Temp Source Heart Rate Source Patient Position - Orthostatic VS BP Location FiO2 (%)   Oral Monitor Sitting Left arm --      Pain Score       7           Vitals:    11/13/22 0539   BP: 135/78   Pulse: 85   Patient Position - Orthostatic VS: Sitting         Visual Acuity      ED Medications  Medications   ketorolac (TORADOL) injection 30 mg (30 mg Intramuscular Given 11/13/22 0619)   acetaminophen (TYLENOL) tablet 650 mg (650 mg Oral Given 11/13/22 0617)   oxyCODONE (ROXICODONE) IR tablet 5 mg (5 mg Oral Given 11/13/22 0617)       Diagnostic Studies  Results Reviewed     None                 No orders to display              Procedures  Procedures         ED Course                               SBIRT 22yo+    Flowsheet Row Most Recent Value   SBIRT (23 yo +)    In order to provide better care to our patients, we are screening all of our patients for alcohol and drug use  Would it be okay to ask you these screening questions? Yes Filed at: 11/13/2022 0542   Initial Alcohol Screen: US AUDIT-C     1  How often do you have a drink containing alcohol? 0 Filed at: 11/13/2022 0542   2  How many drinks containing alcohol do you have on a typical day you are drinking? 0 Filed at: 11/13/2022 0542   3a  Male UNDER 65: How often do you have five or more drinks on one occasion? 0 Filed at: 11/13/2022 0542   3b  FEMALE Any Age, or MALE 65+: How often do you have 4 or more drinks on one occassion? 0 Filed at: 11/13/2022 0542   Audit-C Score 0 Filed at: 11/13/2022 2233   ELVIRA: How many times in the past year have you    Used an illegal drug or used a prescription medication for non-medical reasons? Never Filed at: 11/13/2022 0542                    MDM  Number of Diagnoses or Management Options  Calcific tendinitis  Right shoulder pain  Diagnosis management comments: 49-year-old male presents the emergency department for evaluation of right shoulder pain  On exam is uncomfortable appearing but in no acute distress  Does have pretty significant tenderness  Upper extremity is neurovascular intact  I have reviewed the x-ray images from a few days ago  No acute osseous abnormality is seen, but it does appear that patient has signs of calcific tendinitis which would explain his symptoms  Plan to discharge home with ambulatory referral to Orthopedic surgery  Patient advised to take steroids as previously prescribed in addition to Tylenol p r n  Amari Crump Also advise the patient that he can take a few doses of NSAIDs as needed, but should understand that this may increase his bleeding risk  Patient expressed understanding  Pain improved following medications here  Will discharge home  Patient given strict return precautions  Disposition  Final diagnoses:   Right shoulder pain   Calcific tendinitis     Time reflects when diagnosis was documented in both MDM as applicable and the Disposition within this note     Time User Action Codes Description Comment    11/13/2022  6:59 AM Check, Destiney David Add [M25 511] Right shoulder pain     11/13/2022  6:59 AM Check, Destiney David Add [M65 20] Calcific tendinitis     11/13/2022  7:00 AM Check, Destiney David Modify [E45 114] Right shoulder pain     11/13/2022  7:00 AM Check, Chichi Stoner [K76 27] Calcific tendinitis       ED Disposition     ED Disposition   Discharge    Condition   Stable    Date/Time   Sun Nov 13, 2022  7:00 AM    Comment   Di Gibson discharge to home/self care  Follow-up Information     Follow up With Specialties Details Why Contact Info Additional [address] Emergency Department Emergency Medicine  If symptoms worsen [address]  [place] [id number] [phone] [facility] Emergency Department, [address] [place] [address] Tong Son, [facility],  Family Medicine Schedule an appointment as soon as possible for a visit   [address] [facility]    [facility] [address]  [phone]       [address] [place] [address] Ame marcano Orthopedic Surgery   [facility] [address] [id number]  [address] [place] [address] Specialists Tong marcano, [address] [place] [address], [facility] 702          Discharge Medication List as of 11/13/2022  7:10 AM      CONTINUE these medications which have NOT CHANGED    Details   ciprofloxacin-dexamethasone (CIPRODEX) otic suspension Administer 4 drops into both ears 2 (two) times a day for 5 days, Starting Tue 9/20/2022, Until Sun 9/25/2022, Normal      levocetirizine (XYZAL) 5 MG tablet Take 1 tablet (5 mg total) by mouth every evening, Starting Thu 5/13/2021, Normal      lisinopril (ZESTRIL) 10 mg tablet Take 1 tablet (10 mg total) by mouth daily, Starting Mon 10/18/2021, Normal      Omega-3 1000 MG CAPS Take by mouth in the morning, Historical Med      predniSONE 10 mg tablet 4 pills for 3 days, then 3 pills for 3 days, then 2 pills for 3 days, then 1 pill for 3 days, then stop , Normal      Red Yeast Rice 600 MG TABS Take by mouth in the morning, Historical Med      Xarelto 10 MG tablet Take 1 tablet by mouth once daily, Normal                 PDMP Review     None          ED Provider  Electronically Signed by           Prescott Leyden, MD  11/13/22 6644

## 2022-11-13 NOTE — DISCHARGE INSTRUCTIONS
Continue taking steroids as previously prescribed  You may also take Tylenol every 6 hours as needed for pain  You may take ibuprofen, Advil, or Aleve as needed for additional pain relief, but should be careful as this can increase your bleeding risk as you are already anticoagulated  Follow-up with orthopedic surgery

## 2022-11-13 NOTE — Clinical Note
Carla Zamora was seen and treated in our emergency department on 11/13/2022  Diagnosis:     Griffin Gamez  may return to work on return date  He may return on this date: 11/16/2022         If you have any questions or concerns, please don't hesitate to call        Ernestina Aponte MD    ______________________________           _______________          _______________  Hospital Representative                              Date                                Time

## 2022-11-15 ENCOUNTER — OFFICE VISIT (OUTPATIENT)
Dept: OBGYN CLINIC | Facility: CLINIC | Age: 52
End: 2022-11-15

## 2022-11-15 VITALS
DIASTOLIC BLOOD PRESSURE: 81 MMHG | WEIGHT: 255 LBS | SYSTOLIC BLOOD PRESSURE: 134 MMHG | HEIGHT: 73 IN | HEART RATE: 94 BPM | BODY MASS INDEX: 33.8 KG/M2

## 2022-11-15 DIAGNOSIS — M67.911 ROTATOR CUFF DISORDER, RIGHT: ICD-10-CM

## 2022-11-15 DIAGNOSIS — M65.20 CALCIFIC TENDINITIS: Primary | ICD-10-CM

## 2022-11-15 RX ORDER — TRIAMCINOLONE ACETONIDE 40 MG/ML
80 INJECTION, SUSPENSION INTRA-ARTICULAR; INTRAMUSCULAR
Status: COMPLETED | OUTPATIENT
Start: 2022-11-15 | End: 2022-11-15

## 2022-11-15 RX ORDER — BUPIVACAINE HYDROCHLORIDE 2.5 MG/ML
4 INJECTION, SOLUTION INFILTRATION; PERINEURAL
Status: COMPLETED | OUTPATIENT
Start: 2022-11-15 | End: 2022-11-15

## 2022-11-15 RX ADMIN — TRIAMCINOLONE ACETONIDE 80 MG: 40 INJECTION, SUSPENSION INTRA-ARTICULAR; INTRAMUSCULAR at 15:10

## 2022-11-15 RX ADMIN — BUPIVACAINE HYDROCHLORIDE 4 ML: 2.5 INJECTION, SOLUTION INFILTRATION; PERINEURAL at 15:10

## 2022-11-15 NOTE — PROGRESS NOTES
Assessment/Plan:   Diagnoses and all orders for this visit:    Calcific tendinitis  -     Ambulatory Referral to Orthopedic Surgery  -     MRI shoulder right wo contrast; Future    Rotator cuff disorder, right  -     MRI shoulder right wo contrast; Future    Discussed with patient that today's physical exam is consistent with calcific tendinosis of the right shoulder, and is somewhat concerning for rotator cuff pathology as well given his lack of active motion and strength  He is being provided a referral for MRI of the right shoulder for further evaluation  Patient was offered, and accepted, Kenalog and Marcaine injection(s) to the right subacromial bursa for relief of pain and inflammation  Patient tolerated treatment(s) well  He will be seen after his MRI is complete for re-evaluation  Patient expresses understanding and is in agreement with this treatment plan  The patient has impingement with a possible rotator cuff tear of his right shoulder  Physical examination showed very limited range of motion  A positive drop-arm test is present  X-rays were negative  The shoulder was injected with Kenalog and Marcaine  An MRI is ordered to look for the rotator cuff for further detail  Return back once the MRI is complete    Subjective:   Patient ID: Sophy Conklin  1970     HPI  Patient is a 45 y o  male who presents for initial evaluation of right shoulder pain and decreased function x1 week  He denies any specific incident of injury, but notes that he has been experiencing sharp, stabbing pain in the anterior and lateral aspects of the shoulder following work related activities  Patient states that he has had similar symptoms prior to this most recent onset, and was treated effectively using oral steroids prescribed by his primary care physician    He notes that lately his pain seems to be triggered by motions at or above shoulder height, and can be brought on by activities as light is using the mouse at work  He denies any associated bruising, swelling, numbness, or tingling      The following portions of the patient's history were reviewed and updated as appropriate:  Past medical history, past surgical history, Family history, social history, current medications and allergies    Past Medical History:   Diagnosis Date   • Allergic    • BPH with obstruction/lower urinary tract symptoms    • COVID-19 01/2021   • Deep vein thrombosis (DVT) ([facility] [address] )    • Erectile dysfunction due to arterial insufficiency    • Hypertension    • No known health problems     history of denial of any significant medical , no pertinent past medical history per allscripts   • Testicular hypogonadism    • Varicocele        Past Surgical History:   Procedure Laterality Date   • EYE SURGERY     • INGUINAL HERNIA REPAIR     • UMBILICAL HERNIA REPAIR      per allscripts   • VASECTOMY     • VEIN LIGATION AND STRIPPING         Family History   Problem Relation Age of Onset   • Heart disease Mother    • Heart attack Mother    • Breast cancer Mother    • Hypertension Mother    • Cerebral aneurysm Mother    • Prostate cancer Father    • Bone cancer Father    • Diabetes type II Father    • Testicular cancer Cousin    • Coronary artery disease Maternal Grandmother    • Colon cancer Paternal Grandmother    • No Known Problems Son    • No Known Problems Son        Social History     Socioeconomic History   • Marital status: /Civil Union     Spouse name: None   • Number of children: 2   • Years of education: None   • Highest education level: None   Occupational History   • Occupation:    Tobacco Use   • Smoking status: Never Smoker   • Smokeless tobacco: Never Used   Vaping Use   • Vaping Use: Never used   Substance and Sexual Activity   • Alcohol use: Yes     Comment: Occasionally   • Drug use: No   • Sexual activity: Yes     Partners: Female   Other Topics Concern   • None   Social History Narrative    Daily caffeine use- 6 cups of coffee     Social Determinants of Health     Financial Resource Strain: Not on file   Food Insecurity: Not on file   Transportation Needs: Not on file   Physical Activity: Not on file   Stress: Not on file   Social Connections: Not on file   Intimate Partner Violence: Not on file   Housing Stability: Not on file         Current Outpatient Medications:   •  lisinopril (ZESTRIL) 10 mg tablet, Take 1 tablet (10 mg total) by mouth daily, Disp: 90 tablet, Rfl: 3  •  Omega-3 1000 MG CAPS, Take by mouth in the morning, Disp: , Rfl:   •  predniSONE 10 mg tablet, 4 pills for 3 days, then 3 pills for 3 days, then 2 pills for 3 days, then 1 pill for 3 days, then stop , Disp: 30 tablet, Rfl: 0  •  Red Yeast Rice 600 MG TABS, Take by mouth in the morning, Disp: , Rfl:   •  Xarelto 10 MG tablet, Take 1 tablet by mouth once daily, Disp: 30 tablet, Rfl: 6  •  ciprofloxacin-dexamethasone (CIPRODEX) otic suspension, Administer 4 drops into both ears 2 (two) times a day for 5 days (Patient not taking: No sig reported), Disp: 7 5 mL, Rfl: 0  •  levocetirizine (XYZAL) 5 MG tablet, Take 1 tablet (5 mg total) by mouth every evening (Patient not taking: No sig reported), Disp: 30 tablet, Rfl: 11    Allergies   Allergen Reactions   • Penicillins    • Iodine - Food Allergy Other (See Comments)     Mild allergic like reaction to iohexol (self limited sensation of throat tightening, not requiring treatment)  Review of Systems   Constitutional: Negative for chills, fever and unexpected weight change  HENT: Negative for hearing loss, nosebleeds and sore throat  Eyes: Negative for pain, redness and visual disturbance  Respiratory: Negative for cough, shortness of breath and wheezing  Cardiovascular: Negative for chest pain, palpitations and leg swelling  Gastrointestinal: Negative for abdominal pain, nausea and vomiting  Endocrine: Negative for polydipsia and polyuria     Genitourinary: Negative for dysuria and hematuria  Musculoskeletal:        As noted in HPI   Skin: Negative for rash and wound  Neurological: Negative for dizziness, numbness and headaches  Psychiatric/Behavioral: Negative for decreased concentration and suicidal ideas  The patient is not nervous/anxious  Objective:  /81 (BP Location: Left arm, Patient Position: Sitting, Cuff Size: Large)   Pulse 94   Ht 6' 1" (1 854 m)   Wt 116 kg (255 lb)   BMI 33 64 kg/m²     Ortho Exam  Right shoulder -   No anatomical deformity  Skin is warm and dry to touch with no signs of erythema, ecchymosis, or infection  No soft tissue swelling or effusion noted  No palpable crepitus with passive motion  TTP over anterior acromion, TTP over lateral deltoid/subacromial bursa, TTP over long head biceps tendon in bicipital groove  ROM  FF 0°-20°, ABD 0°-20°, ER 0°-50°  MMT 3+/5 throughout  - glenohumeral instability appreciated on exam  + Neer's, + Gomez  + Speed's, - Yergason's  + empty can, + drop-arm, + belly press, + resisted external rotation, - lift-off  Demonstrates normal elbow, wrist, and finger motion  2+ distal radial pulse with brisk capillary refill to the fingers  Radial, median, and ulnar motor and sensory distributions intact  Sensation to light touch intact distally      Physical Exam  Vitals reviewed  Constitutional:       Appearance: He is well-developed  HENT:      Head: Normocephalic and atraumatic  Nose: Nose normal    Eyes:      Conjunctiva/sclera: Conjunctivae normal    Cardiovascular:      Rate and Rhythm: Normal rate  Pulmonary:      Effort: Pulmonary effort is normal    Musculoskeletal:      Cervical back: Neck supple  Skin:     General: Skin is warm and dry  Capillary Refill: Capillary refill takes less than 2 seconds  Neurological:      Mental Status: He is alert and oriented to person, place, and time     Psychiatric:         Mood and Affect: Mood normal          Behavior: Behavior normal  Diagnostic Test Review:  Attending Physician has personally reviewed pertinent imaging in PACS, impression is as follows:    Review of radiographic series taken 11/10/2022 of the right shoulder shows calcium deposit along the superolateral aspect of the humeral head suggestive of calcific tendinosis of the supraspinatus  Large joint arthrocentesis: R subacromial bursa  Universal Protocol:  Consent: Verbal consent obtained  Risks and benefits: risks, benefits and alternatives were discussed  Consent given by: patient  Time out: Immediately prior to procedure a "time out" was called to verify the correct patient, procedure, equipment, support staff and site/side marked as required    Timeout called at: 11/15/2022 2:55 PM   Patient understanding: patient states understanding of the procedure being performed  Site marked: the operative site was marked  Patient identity confirmed: verbally with patient    Supporting Documentation  Indications: pain and joint swelling   Procedure Details  Location: shoulder - R subacromial bursa  Preparation: Patient was prepped and draped in the usual sterile fashion  Needle size: 22 G  Ultrasound guidance: no  Approach: lateral  Medications administered: 4 mL bupivacaine 0 25 %; 80 mg triamcinolone acetonide 40 mg/mL    Patient tolerance: patient tolerated the procedure well with no immediate complications  Dressing:  Sterile dressing applied        Scribe Attestation    I,:  Enrike Almeida am acting as a scribe while in the presence of the attending physician :       I,:  Leticia Dumont DO personally performed the services described in this documentation    as scribed in my presence :

## 2022-11-15 NOTE — LETTER
November 15, 2022     Patient: Shital Polk  YOB: 1970  Date of Visit: 11/15/2022      To Whom it May Concern:    Madelin Ellis is under my professional care  Leroy Lopez was seen in my office on 11/15/2022  Leroy John can return to work beginning 11/17/2022 as tolerated  If you have any questions or concerns, please don't hesitate to call           Sincerely,          Dayna Reed DO        CC: No Recipients

## 2022-11-21 ENCOUNTER — OFFICE VISIT (OUTPATIENT)
Dept: FAMILY MEDICINE CLINIC | Facility: CLINIC | Age: 52
End: 2022-11-21

## 2022-11-21 VITALS
DIASTOLIC BLOOD PRESSURE: 74 MMHG | HEART RATE: 105 BPM | HEIGHT: 73 IN | WEIGHT: 255.4 LBS | SYSTOLIC BLOOD PRESSURE: 124 MMHG | TEMPERATURE: 97.2 F | OXYGEN SATURATION: 99 % | BODY MASS INDEX: 33.85 KG/M2

## 2022-11-21 DIAGNOSIS — J01.00 ACUTE NON-RECURRENT MAXILLARY SINUSITIS: Primary | ICD-10-CM

## 2022-11-21 RX ORDER — AZITHROMYCIN 250 MG/1
TABLET, FILM COATED ORAL
Qty: 6 TABLET | Refills: 0 | Status: SHIPPED | OUTPATIENT
Start: 2022-11-21 | End: 2022-11-26

## 2022-11-21 NOTE — PROGRESS NOTES
Assessment/Plan:    No problem-specific Assessment & Plan notes found for this encounter  Diagnoses and all orders for this visit:    Acute non-recurrent maxillary sinusitis  -     azithromycin (Zithromax) 250 mg tablet; Take 2 tablets (500 mg total) by mouth daily for 1 day, THEN 1 tablet (250 mg total) daily for 4 days  PHQ-2/9 Depression Screening    Little interest or pleasure in doing things: 0 - not at all  Feeling down, depressed, or hopeless: 0 - not at all  PHQ-2 Score: 0  PHQ-2 Interpretation: Negative depression screen            Subjective:      Patient ID: Pancho Tamayo is a 45 y o  male  Sinusitis  This is a new problem  The current episode started in the past 7 days  The problem is unchanged  There has been no fever  Associated symptoms include congestion, coughing and sinus pressure  Pertinent negatives include no chills, headaches or shortness of breath  The following portions of the patient's history were reviewed and updated as appropriate: allergies, current medications, past family history, past medical history, past social history, past surgical history and problem list     Review of Systems   Constitutional: Negative for chills and fever  HENT: Positive for congestion, sinus pressure and sinus pain  Respiratory: Positive for cough  Negative for shortness of breath and wheezing  Neurological: Negative for headaches  Objective:    /74 (BP Location: Left arm, Patient Position: Sitting)   Pulse 105   Temp (!) 97 2 °F (36 2 °C) (Tympanic)   Ht 6' 1" (1 854 m)   Wt 116 kg (255 lb 6 4 oz)   SpO2 99%   BMI 33 70 kg/m²      Physical Exam  Vitals and nursing note reviewed  Constitutional:       General: He is not in acute distress  Appearance: Normal appearance  He is not ill-appearing, toxic-appearing or diaphoretic  HENT:      Head: Normocephalic and atraumatic        Right Ear: Tympanic membrane, ear canal and external ear normal  There is no impacted cerumen  Left Ear: Tympanic membrane, ear canal and external ear normal  There is no impacted cerumen  Nose: Congestion and rhinorrhea present  Mouth/Throat:      Mouth: Mucous membranes are moist       Pharynx: No oropharyngeal exudate or posterior oropharyngeal erythema  Cardiovascular:      Rate and Rhythm: Normal rate and regular rhythm  Heart sounds: Normal heart sounds  No murmur heard  Pulmonary:      Effort: Pulmonary effort is normal  No respiratory distress  Breath sounds: Normal breath sounds  No stridor  No wheezing, rhonchi or rales  Musculoskeletal:      Cervical back: Neck supple  No rigidity  Lymphadenopathy:      Cervical: No cervical adenopathy  Neurological:      Mental Status: He is alert and oriented to person, place, and time  Psychiatric:         Mood and Affect: Mood normal          Behavior: Behavior normal          Thought Content:  Thought content normal          Judgment: Judgment normal

## 2022-12-06 ENCOUNTER — OFFICE VISIT (OUTPATIENT)
Dept: OBGYN CLINIC | Facility: CLINIC | Age: 52
End: 2022-12-06

## 2022-12-06 VITALS
BODY MASS INDEX: 33.8 KG/M2 | HEART RATE: 105 BPM | WEIGHT: 255 LBS | SYSTOLIC BLOOD PRESSURE: 132 MMHG | DIASTOLIC BLOOD PRESSURE: 91 MMHG | HEIGHT: 73 IN

## 2022-12-06 DIAGNOSIS — M65.20 CALCIFIC TENDINITIS: Primary | ICD-10-CM

## 2022-12-06 NOTE — PROGRESS NOTES
Assessment/Plan:    No problem-specific Assessment & Plan notes found for this encounter  Diagnoses and all orders for this visit:    Calcific tendinitis          The patient is doing quite well in regards to his right shoulder  Strength and motion are intact  No instability  His rotator cuff results were discussed which was negative for any tearing  Continue home exercise program   Follow up on as-needed basis    Subjective:      Patient ID: Americo Mandujano is a 45 y o  male  HPI    The patient has a history of calcific tendinitis of his right shoulder  An MRI was ordered to rule out rotator cuff tearing which fortunately was negative  Did have an injection last visit which helped him significantly  Denies any numbness or tingling  Denies any fever chills  He is pleased with his results    The following portions of the patient's history were reviewed and updated as appropriate: allergies, current medications, past family history, past medical history, past social history, past surgical history and problem list     Review of Systems   Musculoskeletal: Positive for arthralgias and myalgias  Negative for back pain, gait problem, joint swelling, neck pain and neck stiffness  Objective:      /91 (BP Location: Left arm, Patient Position: Sitting, Cuff Size: Large)   Pulse 105   Ht 6' 1" (1 854 m)   Wt 116 kg (255 lb)   BMI 33 64 kg/m²          Physical Exam      Neck was soft and supple  There is a negative axial compression test is neck  Left upper extremity was neurovascular intact  Fingers are pink and mobile  Compartments are soft  Range of motion of his right shoulder is dramatically improved with 170° of flexion, 170° of abduction, and internal rotation to L4  There is no signs of impingement  No signs of instability    Rotator cuff strength testing was intact    MRI was negative

## 2023-01-03 ENCOUNTER — HOSPITAL ENCOUNTER (OUTPATIENT)
Dept: MRI IMAGING | Facility: HOSPITAL | Age: 53
Discharge: HOME | End: 2023-01-03
Attending: PHYSICIAN ASSISTANT

## 2023-01-03 DIAGNOSIS — R10.84 ABDOMINAL PAIN, GENERALIZED: ICD-10-CM

## 2023-01-03 DIAGNOSIS — R93.3 ABNORMAL FINDINGS ON DIAGNOSTIC IMAGING OF DIGESTIVE SYSTEM: ICD-10-CM

## 2023-01-03 DIAGNOSIS — Z86.010 PERSONAL HISTORY OF COLONIC POLYPS: ICD-10-CM

## 2023-01-03 RX ADMIN — GADOBUTROL 11 ML: 604.72 INJECTION INTRAVENOUS at 19:24

## 2023-01-04 DIAGNOSIS — I10 BENIGN ESSENTIAL HYPERTENSION: ICD-10-CM

## 2023-01-04 DIAGNOSIS — J30.1 SEASONAL ALLERGIC RHINITIS DUE TO POLLEN: ICD-10-CM

## 2023-01-04 RX ORDER — LISINOPRIL 10 MG/1
TABLET ORAL
Qty: 90 TABLET | Refills: 0 | Status: SHIPPED | OUTPATIENT
Start: 2023-01-04

## 2023-01-13 ENCOUNTER — HOSPITAL ENCOUNTER (EMERGENCY)
Dept: NON INVASIVE DIAGNOSTICS | Facility: HOSPITAL | Age: 53
Discharge: HOME | End: 2023-01-13

## 2023-01-13 ENCOUNTER — HOSPITAL ENCOUNTER (EMERGENCY)
Facility: HOSPITAL | Age: 53
Discharge: HOME | End: 2023-01-13
Attending: EMERGENCY MEDICINE

## 2023-01-13 VITALS
TEMPERATURE: 97.5 F | RESPIRATION RATE: 19 BRPM | OXYGEN SATURATION: 92 % | SYSTOLIC BLOOD PRESSURE: 129 MMHG | DIASTOLIC BLOOD PRESSURE: 85 MMHG | HEART RATE: 102 BPM

## 2023-01-13 DIAGNOSIS — M79.605 LEFT LEG PAIN: Primary | ICD-10-CM

## 2023-01-13 DIAGNOSIS — Z86.718 HISTORY OF DVT (DEEP VEIN THROMBOSIS): ICD-10-CM

## 2023-01-13 DIAGNOSIS — B35.1 ONYCHOMYCOSIS: ICD-10-CM

## 2023-01-13 RX ORDER — TERBINAFINE HYDROCHLORIDE 250 MG/1
250 TABLET ORAL DAILY
Qty: 84 TABLET | Refills: 0 | Status: SHIPPED | OUTPATIENT
Start: 2023-01-13 | End: 2023-04-07

## 2023-01-13 NOTE — ED PROVIDER NOTES
History  Chief Complaint   Patient presents with   • Leg Pain     Left calf pain started last night while at work  Hx DVT on blood thinners  No redness or swelling  Denies sob/ chest pain     49-year-old male with history of DVT currently on Xarelto presenting to the emergency department for evaluation of left calf pain since yesterday  No known injury or trauma to the area that precipitated his symptoms  He states he feels a cramping sensation in the left calf  He does work on his feet a lot  He is primarily concerned for DVT  He is also reporting abnormal discoloration of his right great toenail that he would like to get looked at  He reports no pain to the area  Denies chest pain, shortness of breath, pleuritic chest pain, palpitations, dizziness, headache, abd pain, nausea, vomiting,  Numbness/tingling  History provided by:  Patient   used: No        Prior to Admission Medications   Prescriptions Last Dose Informant Patient Reported? Taking? Omega-3 1000 MG CAPS   Yes No   Sig: Take by mouth in the morning   Red Yeast Rice 600 MG TABS   Yes No   Sig: Take by mouth in the morning   Xarelto 10 MG tablet   No No   Sig: Take 1 tablet by mouth once daily   ciprofloxacin-dexamethasone (CIPRODEX) otic suspension   No No   Sig: Administer 4 drops into both ears 2 (two) times a day for 5 days   Patient not taking: No sig reported   levocetirizine (XYZAL) 5 MG tablet   No No   Sig: Take 1 tablet (5 mg total) by mouth every evening   Patient not taking: Reported on 10/24/2022   lisinopril (ZESTRIL) 10 mg tablet   No No   Sig: Take 1 tablet by mouth once daily   predniSONE 10 mg tablet   No No   Si pills for 3 days, then 3 pills for 3 days, then 2 pills for 3 days, then 1 pill for 3 days, then stop     Patient not taking: Reported on 2022      Facility-Administered Medications: None       Past Medical History:   Diagnosis Date   • Allergic    • BPH with obstruction/lower urinary tract symptoms    • COVID-19 01/2021   • Deep vein thrombosis (DVT) (HCC)    • Erectile dysfunction due to arterial insufficiency    • Hypertension    • No known health problems     history of denial of any significant medical , no pertinent past medical history per allscripts   • Testicular hypogonadism    • Varicocele        Past Surgical History:   Procedure Laterality Date   • EYE SURGERY     • INGUINAL HERNIA REPAIR     • UMBILICAL HERNIA REPAIR      per allscripts   • VASECTOMY     • VEIN LIGATION AND STRIPPING         Family History   Problem Relation Age of Onset   • Heart disease Mother    • Heart attack Mother    • Breast cancer Mother    • Hypertension Mother    • Cerebral aneurysm Mother    • Prostate cancer Father    • Bone cancer Father    • Diabetes type II Father    • Testicular cancer Cousin    • Coronary artery disease Maternal Grandmother    • Colon cancer Paternal Grandmother    • No Known Problems Son    • No Known Problems Son      I have reviewed and agree with the history as documented  E-Cigarette/Vaping   • E-Cigarette Use Never User      E-Cigarette/Vaping Substances   • Nicotine No    • THC No    • CBD No    • Flavoring No    • Other No    • Unknown No      Social History     Tobacco Use   • Smoking status: Never   • Smokeless tobacco: Never   Vaping Use   • Vaping Use: Never used   Substance Use Topics   • Alcohol use: Yes     Comment: Occasionally   • Drug use: No       Review of Systems   Constitutional: Negative for chills and fever  HENT: Negative for ear pain and sore throat  Eyes: Negative for pain and visual disturbance  Respiratory: Negative for cough and shortness of breath  Cardiovascular: Negative for chest pain and palpitations  Gastrointestinal: Negative for abdominal pain, nausea and vomiting  Genitourinary: Negative for dysuria and hematuria  Musculoskeletal: Positive for myalgias  Negative for arthralgias and back pain     Skin: Negative for color change and rash  Neurological: Negative for dizziness, seizures, syncope and headaches  All other systems reviewed and are negative  Physical Exam  Physical Exam  Vitals and nursing note reviewed  Constitutional:       General: He is not in acute distress  Appearance: Normal appearance  He is well-developed and normal weight  HENT:      Head: Normocephalic and atraumatic  Right Ear: External ear normal       Left Ear: External ear normal       Nose: Nose normal       Mouth/Throat:      Mouth: Mucous membranes are moist       Pharynx: Oropharynx is clear  Eyes:      General: No scleral icterus  Right eye: No discharge  Left eye: No discharge  Conjunctiva/sclera: Conjunctivae normal    Cardiovascular:      Rate and Rhythm: Normal rate  Pulses: Normal pulses  Heart sounds: Normal heart sounds  Pulmonary:      Effort: Pulmonary effort is normal  No respiratory distress  Breath sounds: Normal breath sounds  No wheezing or rales  Chest:      Chest wall: No tenderness  Abdominal:      General: Abdomen is flat  Palpations: Abdomen is soft  Tenderness: There is no abdominal tenderness  There is no right CVA tenderness or left CVA tenderness  Musculoskeletal:         General: No swelling, tenderness or signs of injury  Normal range of motion  Cervical back: Normal range of motion and neck supple  No rigidity  Right lower leg: No edema  Left lower leg: No edema  Feet:      Right foot:      Toenail Condition: Fungal disease present  Skin:     General: Skin is warm and dry  Capillary Refill: Capillary refill takes less than 2 seconds  Findings: No bruising, erythema or rash  Neurological:      General: No focal deficit present  Mental Status: He is alert and oriented to person, place, and time  Mental status is at baseline        Gait: Gait normal    Psychiatric:         Mood and Affect: Mood normal          Behavior: Behavior normal          Thought Content: Thought content normal          Vital Signs  ED Triage Vitals   Temperature Pulse Respirations Blood Pressure SpO2   01/13/23 1755 01/13/23 1754 01/13/23 1754 01/13/23 1754 01/13/23 1754   97 5 °F (36 4 °C) 102 19 129/85 92 %      Temp src Heart Rate Source Patient Position - Orthostatic VS BP Location FiO2 (%)   -- 01/13/23 1754 01/13/23 1754 01/13/23 1754 --    Monitor Sitting Left arm       Pain Score       01/13/23 1754       1           Vitals:    01/13/23 1754   BP: 129/85   Pulse: 102   Patient Position - Orthostatic VS: Sitting         Visual Acuity      ED Medications  Medications - No data to display    Diagnostic Studies  Results Reviewed     None                 VAS lower limb venous duplex study, unilateral/limited    (Results Pending)              Procedures  Procedures         ED Course  ED Course as of 01/14/23 1615   Fri Jan 13, 2023   1849 Carrington Gibson I just finished in room two  His preliminary, he has evidence of chronic nonocclusive clot in the left popliteal vein  I don't see any evidence of acute DVT in the leg   6:32 PM  20 days left  6:37 PM  20 days left                                             Medical Decision Making  44 yo M with hx of DVT presents for evaluation of L calf pain since yesterday  Patient in no distress, overall nontoxic appearing  VS are stable  No clinical signs of PE  No appreciable leg swelling or tenderness on exam  No evidence of trauma or deformity  Patient able to ambulate without difficulty  Fungal disease present on R great toenail  Ordered duplex study to evaluate for DVT  Tech informed me of negative for acute DVT but chronic nonocclusive DVT noted in L popliteal vein  Will plan for discharge with vascular for further evaluation and management  Informed the patient of the results  He verbalizes understanding of the assessment and plan and is amenable to discharge at this time  Sent terbinafine to pharmacy for onychomycosis  Strict return precautions discussed  Patient stable at time of discharge  History of DVT (deep vein thrombosis): self-limited or minor problem  Left leg pain: acute illness or injury  Onychomycosis of right great toenail: acute illness or injury  Amount and/or Complexity of Data Reviewed  ECG/medicine tests: ordered  Risk  Prescription drug management  Disposition  Final diagnoses:   Left leg pain   History of DVT (deep vein thrombosis)   Onychomycosis of right great toenail     Time reflects when diagnosis was documented in both MDM as applicable and the Disposition within this note     Time User Action Codes Description Comment    1/13/2023  6:59 PM Freddrick Bunting Add [M79 605] Left leg pain     1/13/2023  6:59 PM Freddrick Bunting Add [Z86 718] History of DVT (deep vein thrombosis)     1/13/2023  6:59 PM Freddrick Bunting Add [B35 1] Onychomycosis     1/13/2023  7:00 PM Freddrick Bunting Modify [B35 1] Onychomycosis of right great toenail       ED Disposition     ED Disposition   Discharge    Condition   Stable    Date/Time   Fri Jan 13, 2023  7:01 PM    Comment   Robert Gibson discharge to home/self care  Follow-up Information     Follow up With Specialties Details Why Contact Info Additional Karri Lynne DO Family Medicine Call  follow up for further evaluation of symptoms [address] [facility]    [address] [facility] [address]  [phone]       [facility] Emergency Medicine Go to  If symptoms worsen [address] [place] [address] [phone] [facility], [address] [facility], [address] [place] [address] Vascular Surgery Call  follow up for further evaluation of symptoms [address] [facility] [address]  [address] [facility] [place], [address] [place], [place], [address], [phone] Discharge Medication List as of 1/13/2023  7:01 PM      START taking these medications    Details   terbinafine (LamISIL) 250 mg tablet Take 1 tablet (250 mg total) by mouth daily, Starting Fri 1/13/2023, Until Fri 4/7/2023, Normal         CONTINUE these medications which have NOT CHANGED    Details   ciprofloxacin-dexamethasone (CIPRODEX) otic suspension Administer 4 drops into both ears 2 (two) times a day for 5 days, Starting Tue 9/20/2022, Until Sun 9/25/2022, Normal      levocetirizine (XYZAL) 5 MG tablet Take 1 tablet (5 mg total) by mouth every evening, Starting Thu 5/13/2021, Normal      lisinopril (ZESTRIL) 10 mg tablet Take 1 tablet by mouth once daily, Normal      Omega-3 1000 MG CAPS Take by mouth in the morning, Historical Med      predniSONE 10 mg tablet 4 pills for 3 days, then 3 pills for 3 days, then 2 pills for 3 days, then 1 pill for 3 days, then stop , Normal      Red Yeast Rice 600 MG TABS Take by mouth in the morning, Historical Med      Xarelto 10 MG tablet Take 1 tablet by mouth once daily, Normal             No discharge procedures on file      PDMP Review     None          ED Provider  Electronically Signed by           Anya Portillo PA-C  01/14/23 6148

## 2023-01-14 NOTE — DISCHARGE INSTRUCTIONS
Please follow-up with your vascular surgeon and PCP for further evaluation and monitoring  If you develop any new, concerning, worsening symptoms please return to the emergency department for reevaluation

## 2023-01-18 DIAGNOSIS — M25.511 CHRONIC RIGHT SHOULDER PAIN: Primary | ICD-10-CM

## 2023-01-18 DIAGNOSIS — G89.29 CHRONIC RIGHT SHOULDER PAIN: Primary | ICD-10-CM

## 2023-01-18 RX ORDER — PREDNISONE 10 MG/1
TABLET ORAL
Qty: 30 TABLET | Refills: 0 | Status: SHIPPED | OUTPATIENT
Start: 2023-01-18

## 2023-01-20 ENCOUNTER — HOSPITAL ENCOUNTER (OUTPATIENT)
Dept: RADIOLOGY | Facility: HOSPITAL | Age: 53
End: 2023-01-20
Attending: PHYSICIAN ASSISTANT

## 2023-01-20 DIAGNOSIS — R10.84 ABDOMINAL PAIN, GENERALIZED: ICD-10-CM

## 2023-01-20 DIAGNOSIS — R93.3 ABNORMAL FINDINGS ON DIAGNOSTIC IMAGING OF DIGESTIVE SYSTEM: ICD-10-CM

## 2023-02-02 ENCOUNTER — APPOINTMENT (OUTPATIENT)
Dept: LAB | Facility: CLINIC | Age: 53
End: 2023-02-02

## 2023-02-02 DIAGNOSIS — R10.84 GENERALIZED ABDOMINAL PAIN: ICD-10-CM

## 2023-02-02 DIAGNOSIS — R93.3 ABNORMAL FINDINGS ON DIAGNOSTIC IMAGING OF OTHER PARTS OF DIGESTIVE TRACT: ICD-10-CM

## 2023-02-02 LAB
CRP SERPL QL: <3 MG/L
ERYTHROCYTE [SEDIMENTATION RATE] IN BLOOD: 3 MM/HOUR (ref 0–19)

## 2023-02-07 LAB — MISCELLANEOUS LAB TEST RESULT: NORMAL

## 2023-03-04 ENCOUNTER — APPOINTMENT (EMERGENCY)
Dept: CT IMAGING | Facility: HOSPITAL | Age: 53
End: 2023-03-04

## 2023-03-04 ENCOUNTER — HOSPITAL ENCOUNTER (EMERGENCY)
Facility: HOSPITAL | Age: 53
Discharge: HOME | End: 2023-03-04
Attending: EMERGENCY MEDICINE

## 2023-03-04 VITALS
SYSTOLIC BLOOD PRESSURE: 147 MMHG | BODY MASS INDEX: 31.54 KG/M2 | HEART RATE: 93 BPM | TEMPERATURE: 98.7 F | RESPIRATION RATE: 20 BRPM | HEIGHT: 73 IN | DIASTOLIC BLOOD PRESSURE: 88 MMHG | OXYGEN SATURATION: 96 % | WEIGHT: 238 LBS

## 2023-03-04 DIAGNOSIS — R10.13 EPIGASTRIC PAIN: Primary | ICD-10-CM

## 2023-03-04 DIAGNOSIS — R10.84 GENERALIZED ABDOMINAL PAIN: ICD-10-CM

## 2023-03-04 LAB
ALBUMIN SERPL BCP-MCNC: 4.5 G/DL (ref 3.5–5)
ALP SERPL-CCNC: 48 U/L (ref 34–104)
ALT SERPL W P-5'-P-CCNC: 29 U/L (ref 7–52)
ANION GAP SERPL CALCULATED.3IONS-SCNC: 8 MMOL/L (ref 4–13)
AST SERPL W P-5'-P-CCNC: 21 U/L (ref 13–39)
BASOPHILS # BLD AUTO: 0.05 THOUSANDS/ÂΜL (ref 0–0.1)
BASOPHILS NFR BLD AUTO: 1 % (ref 0–1)
BILIRUB SERPL-MCNC: 0.64 MG/DL (ref 0.2–1)
BILIRUB UR QL STRIP: NEGATIVE
BUN SERPL-MCNC: 22 MG/DL (ref 5–25)
CALCIUM SERPL-MCNC: 9.3 MG/DL (ref 8.4–10.2)
CHLORIDE SERPL-SCNC: 104 MMOL/L (ref 96–108)
CLARITY UR: CLEAR
CO2 SERPL-SCNC: 25 MMOL/L (ref 21–32)
COLOR UR: YELLOW
CREAT SERPL-MCNC: 1.06 MG/DL (ref 0.6–1.3)
EOSINOPHIL # BLD AUTO: 0.12 THOUSAND/ÂΜL (ref 0–0.61)
EOSINOPHIL NFR BLD AUTO: 1 % (ref 0–6)
ERYTHROCYTE [DISTWIDTH] IN BLOOD BY AUTOMATED COUNT: 12.2 % (ref 11.6–15.1)
GFR SERPL CREATININE-BSD FRML MDRD: 79 ML/MIN/1.73SQ M
GLUCOSE SERPL-MCNC: 95 MG/DL (ref 65–140)
GLUCOSE UR STRIP-MCNC: NEGATIVE MG/DL
HCT VFR BLD AUTO: 46.4 % (ref 36.5–49.3)
HGB BLD-MCNC: 16.2 G/DL (ref 12–17)
HGB UR QL STRIP.AUTO: NEGATIVE
IMM GRANULOCYTES # BLD AUTO: 0.02 THOUSAND/UL (ref 0–0.2)
IMM GRANULOCYTES NFR BLD AUTO: 0 % (ref 0–2)
KETONES UR STRIP-MCNC: ABNORMAL MG/DL
LEUKOCYTE ESTERASE UR QL STRIP: NEGATIVE
LIPASE SERPL-CCNC: 11 U/L (ref 11–82)
LYMPHOCYTES # BLD AUTO: 2.11 THOUSANDS/ÂΜL (ref 0.6–4.47)
LYMPHOCYTES NFR BLD AUTO: 24 % (ref 14–44)
MCH RBC QN AUTO: 32.9 PG (ref 26.8–34.3)
MCHC RBC AUTO-ENTMCNC: 34.9 G/DL (ref 31.4–37.4)
MCV RBC AUTO: 94 FL (ref 82–98)
MONOCYTES # BLD AUTO: 0.95 THOUSAND/ÂΜL (ref 0.17–1.22)
MONOCYTES NFR BLD AUTO: 11 % (ref 4–12)
NEUTROPHILS # BLD AUTO: 5.73 THOUSANDS/ÂΜL (ref 1.85–7.62)
NEUTS SEG NFR BLD AUTO: 63 % (ref 43–75)
NITRITE UR QL STRIP: NEGATIVE
NRBC BLD AUTO-RTO: 0 /100 WBCS
PH UR STRIP.AUTO: 6 [PH]
PLATELET # BLD AUTO: 228 THOUSANDS/UL (ref 149–390)
PMV BLD AUTO: 9 FL (ref 8.9–12.7)
POTASSIUM SERPL-SCNC: 3.9 MMOL/L (ref 3.5–5.3)
PROT SERPL-MCNC: 7.2 G/DL (ref 6.4–8.4)
PROT UR STRIP-MCNC: NEGATIVE MG/DL
RBC # BLD AUTO: 4.93 MILLION/UL (ref 3.88–5.62)
SODIUM SERPL-SCNC: 137 MMOL/L (ref 135–147)
SP GR UR STRIP.AUTO: >=1.03
UROBILINOGEN UR QL STRIP.AUTO: 0.2 E.U./DL
WBC # BLD AUTO: 8.98 THOUSAND/UL (ref 4.31–10.16)

## 2023-03-04 RX ORDER — FAMOTIDINE 20 MG/1
20 TABLET, FILM COATED ORAL DAILY
Qty: 30 TABLET | Refills: 0 | Status: SHIPPED | OUTPATIENT
Start: 2023-03-04 | End: 2023-04-03

## 2023-03-04 NOTE — DISCHARGE INSTRUCTIONS
If you develop any new or worsening symptoms please immediately return to your nearest emergency department        Please make sure to follow up with your GI doctor as soon as possible

## 2023-03-04 NOTE — ED PROVIDER NOTES
History  Chief Complaint   Patient presents with   • Abdominal Pain     Began yesterday while at work  Denies n,v,d     L sided abdominal pain about 6-7 hours ago, made worse with bending over  Worse with sitting  Better standing  Declines N/V/D  Normal BM since pain started and non-bloody didn't change pain in anyway  Bending over 4-5  Standing 1-2  colonscopy last year which showed diverticulosis but otherwise normal          Prior to Admission Medications   Prescriptions Last Dose Informant Patient Reported? Taking?    Omega-3 1000 MG CAPS   Yes No   Sig: Take by mouth in the morning   Red Yeast Rice 600 MG TABS   Yes No   Sig: Take by mouth in the morning   Xarelto 10 MG tablet   No No   Sig: Take 1 tablet by mouth once daily   ciprofloxacin-dexamethasone (CIPRODEX) otic suspension   No No   Sig: Administer 4 drops into both ears 2 (two) times a day for 5 days   Patient not taking: No sig reported   levocetirizine (XYZAL) 5 MG tablet   No No   Sig: Take 1 tablet (5 mg total) by mouth every evening   Patient not taking: Reported on 10/24/2022   lisinopril (ZESTRIL) 10 mg tablet   No No   Sig: Take 1 tablet by mouth once daily   predniSONE 10 mg tablet Not Taking  No No   Si pills for 3 days, then 3 pills for 3 days, then 2 pills for 3 days, then 1 pills for 3 days, then stop   Patient not taking: Reported on 3/4/2023   terbinafine (LamISIL) 250 mg tablet   No No   Sig: Take 1 tablet (250 mg total) by mouth daily      Facility-Administered Medications: None       Past Medical History:   Diagnosis Date   • Allergic    • BPH with obstruction/lower urinary tract symptoms    • COVID-19 2021   • Deep vein thrombosis (DVT) (HCC)    • Erectile dysfunction due to arterial insufficiency    • Hypertension    • No known health problems     history of denial of any significant medical , no pertinent past medical history per allscripts   • Testicular hypogonadism    • Varicocele        Past Surgical History:   Procedure Laterality Date   • EYE SURGERY     • INGUINAL HERNIA REPAIR     • UMBILICAL HERNIA REPAIR      per allscripts   • VASECTOMY     • VEIN LIGATION AND STRIPPING         Family History   Problem Relation Age of Onset   • Heart disease Mother    • Heart attack Mother    • Breast cancer Mother    • Hypertension Mother    • Cerebral aneurysm Mother    • Prostate cancer Father    • Bone cancer Father    • Diabetes type II Father    • Testicular cancer Cousin    • Coronary artery disease Maternal Grandmother    • Colon cancer Paternal Grandmother    • No Known Problems Son    • No Known Problems Son      I have reviewed and agree with the history as documented  E-Cigarette/Vaping   • E-Cigarette Use Never User      E-Cigarette/Vaping Substances   • Nicotine No    • THC No    • CBD No    • Flavoring No    • Other No    • Unknown No      Social History     Tobacco Use   • Smoking status: Never   • Smokeless tobacco: Never   Vaping Use   • Vaping Use: Never used   Substance Use Topics   • Alcohol use: Yes     Comment: Occasionally   • Drug use: No       Review of Systems   Gastrointestinal: Positive for abdominal pain  All other systems reviewed and are negative  Physical Exam  Physical Exam  Vitals and nursing note reviewed  Constitutional:       General: He is not in acute distress  Appearance: He is well-developed  He is not diaphoretic  HENT:      Head: Normocephalic and atraumatic  Right Ear: External ear normal       Left Ear: External ear normal       Nose: Nose normal    Eyes:      General: No scleral icterus  Right eye: No discharge  Left eye: No discharge  Conjunctiva/sclera: Conjunctivae normal    Cardiovascular:      Rate and Rhythm: Normal rate and regular rhythm  Heart sounds: Normal heart sounds  No murmur heard  No friction rub  No gallop  Pulmonary:      Effort: Pulmonary effort is normal  No respiratory distress  Breath sounds: Normal breath sounds   No wheezing or rales  Abdominal:      General: Bowel sounds are normal  There is no distension  Palpations: Abdomen is soft  There is no mass  Tenderness: There is generalized abdominal tenderness and tenderness in the epigastric area  There is no guarding  Musculoskeletal:         General: No tenderness or deformity  Normal range of motion  Cervical back: Normal range of motion and neck supple  Skin:     General: Skin is warm and dry  Coloration: Skin is not pale  Findings: No erythema or rash  Neurological:      Mental Status: He is alert and oriented to person, place, and time  Psychiatric:         Behavior: Behavior normal          Thought Content:  Thought content normal          Judgment: Judgment normal          Vital Signs  ED Triage Vitals [03/04/23 0730]   Temperature Pulse Respirations Blood Pressure SpO2   98 7 °F (37 1 °C) 93 20 147/88 96 %      Temp Source Heart Rate Source Patient Position - Orthostatic VS BP Location FiO2 (%)   Temporal Monitor Sitting Left arm --      Pain Score       2           Vitals:    03/04/23 0730   BP: 147/88   Pulse: 93   Patient Position - Orthostatic VS: Sitting         Visual Acuity      ED Medications  Medications - No data to display    Diagnostic Studies  Results Reviewed     Procedure Component Value Units Date/Time    Comprehensive metabolic panel [[id number]] Collected: 03/04/23 0813    Lab Status: Final result Specimen: Blood from Arm, Right Updated: 03/04/23 0842     Sodium 137 mmol/L      Potassium 3 9 mmol/L      Chloride 104 mmol/L      CO2 25 mmol/L      ANION GAP 8 mmol/L      BUN 22 mg/dL      Creatinine 1 06 mg/dL      Glucose 95 mg/dL      Calcium 9 3 mg/dL      AST 21 U/L      ALT 29 U/L      Alkaline Phosphatase 48 U/L      Total Protein 7 2 g/dL      Albumin 4 5 g/dL      Total Bilirubin 0 64 mg/dL      eGFR 79 ml/min/1 73sq m     Narrative:      [place] guidelines for Chronic Kidney Disease (CKD):    •  Stage 1 with normal or high GFR (GFR > 90 mL/min/1 73 square meters)  •  Stage 2 Mild CKD (GFR = 60-89 mL/min/1 73 square meters)  •  Stage 3A Moderate CKD (GFR = 45-59 mL/min/1 73 square meters)  •  Stage 3B Moderate CKD (GFR = 30-44 mL/min/1 73 square meters)  •  Stage 4 Severe CKD (GFR = 15-29 mL/min/1 73 square meters)  •  Stage 5 End Stage CKD (GFR <15 mL/min/1 73 square meters)  Note: GFR calculation is accurate only with a steady state creatinine    Lipase [[id number]]  (Normal) Collected: 03/04/23 0813    Lab Status: Final result Specimen: Blood from Arm, Right Updated: 03/04/23 0842     Lipase 11 u/L     UA w Reflex to Microscopic w Reflex to Culture [[id number]]  (Abnormal) Collected: 03/04/23 0813    Lab Status: Final result Specimen: Urine, Clean Catch Updated: 03/04/23 0831     Color, UA Yellow     Clarity, UA Clear     Specific Gravity, UA >=1 030     pH, UA 6 0     Leukocytes, UA Negative     Nitrite, UA Negative     Protein, UA Negative mg/dl      Glucose, UA Negative mg/dl      Ketones, UA Trace mg/dl      Urobilinogen, UA 0 2 E U /dl      Bilirubin, UA Negative     Occult Blood, UA Negative    CBC and differential [[id number]] Collected: 03/04/23 0813    Lab Status: Final result Specimen: Blood from Arm, Right Updated: 03/04/23 0824     WBC 8 98 Thousand/uL      RBC 4 93 Million/uL      Hemoglobin 16 2 g/dL      Hematocrit 46 4 %      MCV 94 fL      MCH 32 9 pg      MCHC 34 9 g/dL      RDW 12 2 %      MPV 9 0 fL      Platelets 926 Thousands/uL      nRBC 0 /100 WBCs      Neutrophils Relative 63 %      Immat GRANS % 0 %      Lymphocytes Relative 24 %      Monocytes Relative 11 %      Eosinophils Relative 1 %      Basophils Relative 1 %      Neutrophils Absolute 5 73 Thousands/µL      Immature Grans Absolute 0 02 Thousand/uL      Lymphocytes Absolute 2 11 Thousands/µL      Monocytes Absolute 0 95 Thousand/µL      Eosinophils Absolute 0 12 Thousand/µL      Basophils Absolute 0 05 Thousands/µL CT abdomen pelvis wo contrast   Final Result by Laura Stephens MD (55/55 9801)      No acute abnormality in the abdomen or pelvis  Workstation performed: [id number]                    Procedures  Procedures         ED Course  ED Course as of 03/04/23 1347   Sat Mar 04, 2023   0801 Offered pain medications to patient and at this time he declined  1071 Discussed all laboratory and radiologic findings with patient at bedside  Patient aware of his chronic mesenteric panniculitis as he follows with GI for this  Discussed possible gastritis versus abdominal muscle wall injury offered patient muscle relaxers which she declined  Offered patient Pepcid which she states he will take  Strict turn precautions discussed and provided and recommend follow-up with his GI doctor which he states he will do  SBIRT 20yo+    Flowsheet Row Most Recent Value   SBIRT (25 yo +)    In order to provide better care to our patients, we are screening all of our patients for alcohol and drug use  Would it be okay to ask you these screening questions? Yes Filed at: 03/04/2023 7895   Initial Alcohol Screen: US AUDIT-C     1  How often do you have a drink containing alcohol? 0 Filed at: 03/04/2023 0814   2  How many drinks containing alcohol do you have on a typical day you are drinking? 0 Filed at: 03/04/2023 0814   3a  Male UNDER 65: How often do you have five or more drinks on one occasion? 0 Filed at: 03/04/2023 0814   3b  FEMALE Any Age, or MALE 65+: How often do you have 4 or more drinks on one occassion? 0 Filed at: 03/04/2023 0814   Audit-C Score 0 Filed at: 03/04/2023 3333   ELVIRA: How many times in the past year have you    Used an illegal drug or used a prescription medication for non-medical reasons? Never Filed at: 03/04/2023 3321                    Medical Decision Making  47-year-old male presenting with abdominal pain no gastric symptoms and no other symptoms    Patient with pain worse with movement  Blood work and CT scan reviewed with patient and no obvious acute cause of this patient's pain  Offered patient pain medications which she declined  As patient with epigastric discomfort patient given Pepcid and recommend following up with his GI which he already sees as he has had recent colonoscopy  Stable for discharge  Amount and/or Complexity of Data Reviewed  Labs: ordered  Radiology: ordered  Disposition  Final diagnoses:   Epigastric pain   Generalized abdominal pain     Time reflects when diagnosis was documented in both MDM as applicable and the Disposition within this note     Time User Action Codes Description Comment    3/4/2023  9:39 AM Montez Gell Add [R10 13] Epigastric pain     3/4/2023  9:39 AM Montez Gell Add [R10 84] Generalized abdominal pain       ED Disposition     ED Disposition   Discharge    Condition   Stable    Date/Time   Sat Mar 4, 2023  9:39 AM    Comment   4400 Wilmar Garnica discharge to home/self care  Follow-up Information     Follow up With Specialties Details Why Contact Info Additional Information    Mervat Abreu DO Family Medicine   [address] [facility]    [place] [address]  [phone]       Your GI Doctor         [facility] Emergency Department Emergency Medicine Go to  As needed, If symptoms worsen [address]  [facility] [address] [phone] [facility] Emergency Department, [address] [facility] , [place], [address] [facility]          Discharge Medication List as of 3/4/2023  9:40 AM      START taking these medications    Details   famotidine (PEPCID) 20 mg tablet Take 1 tablet (20 mg total) by mouth daily, Starting Sat 3/4/2023, Until Mon 4/3/2023, Normal         CONTINUE these medications which have NOT CHANGED    Details   ciprofloxacin-dexamethasone (CIPRODEX) otic suspension Administer 4 drops into both ears 2 (two) times a day for 5 days, Starting Tue 9/20/2022, Until Sun 9/25/2022, Normal      levocetirizine (XYZAL) 5 MG tablet Take 1 tablet (5 mg total) by mouth every evening, Starting Thu 5/13/2021, Normal      lisinopril (ZESTRIL) 10 mg tablet Take 1 tablet by mouth once daily, Normal      Omega-3 1000 MG CAPS Take by mouth in the morning, Historical Med      predniSONE 10 mg tablet 4 pills for 3 days, then 3 pills for 3 days, then 2 pills for 3 days, then 1 pills for 3 days, then stop, Normal      Red Yeast Rice 600 MG TABS Take by mouth in the morning, Historical Med      terbinafine (LamISIL) 250 mg tablet Take 1 tablet (250 mg total) by mouth daily, Starting Fri 1/13/2023, Until Fri 4/7/2023, Normal      Xarelto 10 MG tablet Take 1 tablet by mouth once daily, Normal             No discharge procedures on file      PDMP Review     None          ED Provider  Electronically Signed by           Norm Pizano DO  03/04/23 1975

## 2023-03-04 NOTE — ED NOTES
Pt declined IV at this time, informed pt should he change his mind and/or request pain medication to let staff know so an IV could be placed   Provider aware     Javid Lin RN  03/04/23 0375

## 2023-05-02 ENCOUNTER — OFFICE VISIT (OUTPATIENT)
Dept: FAMILY MEDICINE CLINIC | Facility: CLINIC | Age: 53
End: 2023-05-02

## 2023-05-02 VITALS
DIASTOLIC BLOOD PRESSURE: 72 MMHG | TEMPERATURE: 96.9 F | SYSTOLIC BLOOD PRESSURE: 112 MMHG | OXYGEN SATURATION: 99 % | HEART RATE: 99 BPM | HEIGHT: 73 IN | BODY MASS INDEX: 32.2 KG/M2 | WEIGHT: 243 LBS

## 2023-05-02 DIAGNOSIS — M25.512 ACUTE PAIN OF LEFT SHOULDER: Primary | ICD-10-CM

## 2023-05-02 RX ORDER — PREDNISONE 10 MG/1
TABLET ORAL
Qty: 30 TABLET | Refills: 0 | Status: SHIPPED | OUTPATIENT
Start: 2023-05-02

## 2023-05-02 NOTE — PROGRESS NOTES
"Assessment/Plan:    No problem-specific Assessment & Plan notes found for this encounter  Diagnoses and all orders for this visit:    Acute pain of left shoulder  -     predniSONE 10 mg tablet; 4 pills for 3 days, then 3 pills for 3 days, then 2 pills for 3 days, then 1 pills for 3 days, then stop          PHQ-2/9 Depression Screening    Little interest or pleasure in doing things: 0 - not at all  Feeling down, depressed, or hopeless: 0 - not at all  PHQ-2 Score: 0  PHQ-2 Interpretation: Negative depression screen            Subjective:      Patient ID: Melody Looney is a 45 y o  male  Shoulder Pain   The pain is present in the left shoulder  This is a recurrent problem  The current episode started in the past 7 days  The problem occurs constantly  The pain is moderate  The following portions of the patient's history were reviewed and updated as appropriate: allergies, current medications, past family history, past medical history, past social history, past surgical history and problem list     Review of Systems   Respiratory: Negative for wheezing  Skin: Negative for rash  Objective:    /72 (BP Location: Left arm, Patient Position: Sitting, Cuff Size: Large)   Pulse 99   Temp (!) 96 9 °F (36 1 °C) (Tympanic)   Ht 6' 1\" (1 854 m)   Wt 110 kg (243 lb)   SpO2 99%   BMI 32 06 kg/m²      Physical Exam  Vitals and nursing note reviewed  Constitutional:       General: He is not in acute distress  Appearance: Normal appearance  He is not ill-appearing, toxic-appearing or diaphoretic  HENT:      Head: Normocephalic and atraumatic  Eyes:      Conjunctiva/sclera: Conjunctivae normal    Cardiovascular:      Rate and Rhythm: Normal rate and regular rhythm  Heart sounds: Normal heart sounds  No murmur heard  Pulmonary:      Effort: Pulmonary effort is normal  No respiratory distress  Breath sounds: Normal breath sounds  No wheezing, rhonchi or rales     Musculoskeletal: " Cervical back: Normal range of motion and neck supple  No rigidity  Right lower leg: No edema  Left lower leg: No edema  Lymphadenopathy:      Cervical: No cervical adenopathy  Neurological:      Mental Status: He is alert and oriented to person, place, and time  Psychiatric:         Mood and Affect: Mood normal          Behavior: Behavior normal          Thought Content:  Thought content normal          Judgment: Judgment normal        Ortho Exam

## 2023-05-19 ENCOUNTER — APPOINTMENT (EMERGENCY)
Dept: RADIOLOGY | Facility: HOSPITAL | Age: 53
End: 2023-05-19

## 2023-05-19 ENCOUNTER — HOSPITAL ENCOUNTER (EMERGENCY)
Facility: HOSPITAL | Age: 53
Discharge: HOME | End: 2023-05-19
Attending: EMERGENCY MEDICINE

## 2023-05-19 VITALS
OXYGEN SATURATION: 97 % | SYSTOLIC BLOOD PRESSURE: 150 MMHG | DIASTOLIC BLOOD PRESSURE: 97 MMHG | BODY MASS INDEX: 31.14 KG/M2 | WEIGHT: 235 LBS | HEIGHT: 73 IN | HEART RATE: 103 BPM | TEMPERATURE: 97.6 F | RESPIRATION RATE: 16 BRPM

## 2023-05-19 DIAGNOSIS — M65.20 CALCIFIC TENDINITIS: ICD-10-CM

## 2023-05-19 DIAGNOSIS — M25.512 ACUTE PAIN OF LEFT SHOULDER: Primary | ICD-10-CM

## 2023-05-19 RX ORDER — KETOROLAC TROMETHAMINE 30 MG/ML
30 INJECTION, SOLUTION INTRAMUSCULAR; INTRAVENOUS ONCE
Status: COMPLETED | OUTPATIENT
Start: 2023-05-19 | End: 2023-05-19

## 2023-05-19 RX ORDER — OXYCODONE HYDROCHLORIDE AND ACETAMINOPHEN 5; 325 MG/1; MG/1
1 TABLET ORAL ONCE
Status: COMPLETED | OUTPATIENT
Start: 2023-05-19 | End: 2023-05-19

## 2023-05-19 RX ORDER — MELOXICAM 15 MG/1
15 TABLET ORAL DAILY
Qty: 7 TABLET | Refills: 0 | Status: SHIPPED | OUTPATIENT
Start: 2023-05-19 | End: 2023-05-26

## 2023-05-19 RX ADMIN — OXYCODONE HYDROCHLORIDE AND ACETAMINOPHEN 1 TABLET: 5; 325 TABLET ORAL at 05:20

## 2023-05-19 RX ADMIN — KETOROLAC TROMETHAMINE 30 MG: 30 INJECTION, SOLUTION INTRAMUSCULAR; INTRAVENOUS at 05:20

## 2023-05-19 NOTE — ED PROVIDER NOTES
History  Chief Complaint   Patient presents with   • Joint Pain     L shoulder pain  Was seen by PCP  ,no injury     66-year-old male with history of calcific tendinitis presents to the emergency department for evaluation of atraumatic left shoulder pain  Patient reports the left shoulder started bothering him approximately 2 weeks ago  He was evaluated by his primary care physician who prescribed him a course of oral steroids  He states while on the steroids, his pain had improved, but states it started to flareup again towards the end of his steroid taper  The pain is located diffusely throughout the left shoulder and is made worse with movement of that joint  Occasionally the pain will radiate down the back of the left upper extremity  He denies any trauma or falls  No overlying skin changes  Denies any systemic symptoms including no fevers or chills  Prior to Admission Medications   Prescriptions Last Dose Informant Patient Reported? Taking?    Omega-3 1000 MG CAPS   Yes No   Sig: Take by mouth in the morning   Red Yeast Rice 600 MG TABS   Yes No   Sig: Take by mouth in the morning   Xarelto 10 MG tablet   No No   Sig: Take 1 tablet by mouth once daily   ciprofloxacin-dexamethasone (CIPRODEX) otic suspension   No No   Sig: Administer 4 drops into both ears 2 (two) times a day for 5 days   Patient not taking: No sig reported   famotidine (PEPCID) 20 mg tablet   No No   Sig: Take 1 tablet (20 mg total) by mouth daily   fluticasone (FLONASE) 50 mcg/act nasal spray   No No   Si sprays into each nostril daily   levocetirizine (XYZAL) 5 MG tablet   No No   Sig: Take 1 tablet (5 mg total) by mouth every evening   Patient not taking: Reported on 10/24/2022   lisinopril (ZESTRIL) 10 mg tablet   No No   Sig: Take 1 tablet by mouth once daily   loratadine (CLARITIN) 10 mg tablet   No No   Sig: Take 1 tablet (10 mg total) by mouth daily   predniSONE 10 mg tablet   No No   Si pills for 3 days, then 3 pills for 3 days, then 2 pills for 3 days, then 1 pills for 3 days, then stop      Facility-Administered Medications: None       Past Medical History:   Diagnosis Date   • Allergic    • BPH with obstruction/lower urinary tract symptoms    • COVID-19 01/2021   • Deep vein thrombosis (DVT) (HCC)    • Erectile dysfunction due to arterial insufficiency    • Hypertension    • No known health problems     history of denial of any significant medical , no pertinent past medical history per allscripts   • Testicular hypogonadism    • Varicocele        Past Surgical History:   Procedure Laterality Date   • EYE SURGERY     • INGUINAL HERNIA REPAIR     • UMBILICAL HERNIA REPAIR      per allscripts   • VASECTOMY     • VEIN LIGATION AND STRIPPING         Family History   Problem Relation Age of Onset   • Heart disease Mother    • Heart attack Mother    • Breast cancer Mother    • Hypertension Mother    • Cerebral aneurysm Mother    • Prostate cancer Father    • Bone cancer Father    • Diabetes type II Father    • Testicular cancer Cousin    • Coronary artery disease Maternal Grandmother    • Colon cancer Paternal Grandmother    • No Known Problems Son    • No Known Problems Son      I have reviewed and agree with the history as documented  E-Cigarette/Vaping   • E-Cigarette Use Never User      E-Cigarette/Vaping Substances   • Nicotine No    • THC No    • CBD No    • Flavoring No    • Other No    • Unknown No      Social History     Tobacco Use   • Smoking status: Never   • Smokeless tobacco: Never   Vaping Use   • Vaping Use: Never used   Substance Use Topics   • Alcohol use: Yes     Comment: Occasionally   • Drug use: No       Review of Systems   Constitutional: Negative for chills and fever  HENT: Negative for ear pain and sore throat  Eyes: Negative for pain and visual disturbance  Respiratory: Negative for cough and shortness of breath  Cardiovascular: Negative for chest pain and palpitations  Gastrointestinal: Negative for abdominal pain and vomiting  Genitourinary: Negative for dysuria and hematuria  Musculoskeletal: Positive for arthralgias  Negative for back pain  Skin: Negative for color change and rash  Neurological: Negative for seizures and syncope  All other systems reviewed and are negative  Physical Exam  Physical Exam  Vitals and nursing note reviewed  Constitutional:       General: He is not in acute distress  HENT:      Head: Normocephalic and atraumatic  Right Ear: External ear normal       Left Ear: External ear normal       Nose: Nose normal       Mouth/Throat:      Mouth: Mucous membranes are moist    Eyes:      Extraocular Movements: Extraocular movements intact  Conjunctiva/sclera: Conjunctivae normal       Pupils: Pupils are equal, round, and reactive to light  Cardiovascular:      Rate and Rhythm: Normal rate and regular rhythm  Pulses: Normal pulses  Pulmonary:      Effort: Pulmonary effort is normal  No respiratory distress  Breath sounds: No stridor  Musculoskeletal:      Left shoulder: Tenderness present  No crepitus  Decreased range of motion  Normal pulse  Cervical back: Normal range of motion and neck supple  Comments: Diffuse tenderness to palpation of the left shoulder specifically the deltoid region and where the pec inserts into the shoulder  No AC joint or clavicular tenderness  No obvious deformity present  Pain is made worse with abduction of the left shoulder  No tenderness in the elbow, forearm, wrist, or hand  Patient has no pain with flexion or extension at the elbow joint  Skin:     General: Skin is warm and dry  Capillary Refill: Capillary refill takes less than 2 seconds  Findings: No bruising, erythema or rash  Neurological:      General: No focal deficit present  Mental Status: He is alert and oriented to person, place, and time  Sensory: No sensory deficit     Psychiatric: Mood and Affect: Mood normal          Behavior: Behavior normal          Vital Signs  ED Triage Vitals   Temperature Pulse Respirations Blood Pressure SpO2   05/19/23 0506 05/19/23 0507 05/19/23 0507 05/19/23 0507 05/19/23 0507   97 6 °F (36 4 °C) 103 16 150/97 97 %      Temp Source Heart Rate Source Patient Position - Orthostatic VS BP Location FiO2 (%)   05/19/23 0506 05/19/23 0507 05/19/23 0507 05/19/23 0507 --   Tympanic Monitor Sitting Right arm       Pain Score       05/19/23 0506       4           Vitals:    05/19/23 0507   BP: 150/97   Pulse: 103   Patient Position - Orthostatic VS: Sitting         Visual Acuity      ED Medications  Medications   ketorolac (TORADOL) injection 30 mg (30 mg Intramuscular Given 5/19/23 0520)   oxyCODONE-acetaminophen (PERCOCET) 5-325 mg per tablet 1 tablet (1 tablet Oral Given 5/19/23 0520)       Diagnostic Studies  Results Reviewed     None                 XR shoulder 2+ views LEFT   ED Interpretation by Monroe Bello MD (05/19 0544)   No acute osseous abnormality, calcifications along the superolateral humeral head consistent with calcific tendinitis                 Procedures  Procedures         ED Course                               SBIRT 22yo+    Flowsheet Row Most Recent Value   Initial Alcohol Screen: US AUDIT-C     1  How often do you have a drink containing alcohol? 0 Filed at: 05/19/2023 0507   2  How many drinks containing alcohol do you have on a typical day you are drinking? 0 Filed at: 05/19/2023 0507   3a  Male UNDER 65: How often do you have five or more drinks on one occasion? 0 Filed at: 05/19/2023 0507   3b  FEMALE Any Age, or MALE 65+: How often do you have 4 or more drinks on one occassion? 0 Filed at: 05/19/2023 0507   Audit-C Score 0 Filed at: 05/19/2023 7984   ELVIRA: How many times in the past year have you    Used an illegal drug or used a prescription medication for non-medical reasons?  Never Filed at: 05/19/2023 0507 Medical Decision Making  75-year-old male presents emergency department for evaluation of left shoulder pain  On exam, he is uncomfortable appearing, but in no acute distress  He has significant tenderness to palpation of the left shoulder and decreased range of motion secondary to pain  The left upper extremity is neurovascular intact  There is no overlying skin changes or systemic symptoms to be concerned for infectious etiology  Differential includes but is not limited to fracture, dislocation, tendinitis, bursitis, sprain  As patient has not had any recent imaging of the left shoulder, will get x-ray  Will treat with Toradol and Percocet  X-ray was negative for any acute osseous abnormalities but does demonstrate some calcifications consistent with calcific tendinitis which is likely the source of the patient's pain  His symptoms improved following medications  He will be discharged home with short course of NSAIDs and orthopedic follow-up  Patient understands the risk of potential increased bleeding while on NSAIDs and anticoagulants  He expressed understanding of these instructions and is in agreement with the plan  He was given strict return precautions  Amount and/or Complexity of Data Reviewed  External Data Reviewed: radiology and notes  Radiology: ordered and independent interpretation performed  Risk  OTC drugs  Prescription drug management            Disposition  Final diagnoses:   Acute pain of left shoulder   Calcific tendinitis     Time reflects when diagnosis was documented in both MDM as applicable and the Disposition within this note     Time User Action Codes Description Comment    5/19/2023  5:42 AM Check, Dasha Lozada [M25 512] Acute pain of left shoulder     5/19/2023  5:42 AM Sebastian White [M48 20] Calcific tendinitis       ED Disposition     ED Disposition   Discharge    Condition   Stable    Date/Time   Fri May 19, 2023  5:47 AM    Comment   Quirino Alvarenga Paige discharge to home/self care                 Follow-up Information     Follow up With Specialties Details Why [address] Emergency Department Emergency Medicine  If symptoms worsen [address] [id number]  [facility] Emergency Department, [address] [place] [address] , [place], [address] Radha Prakash, DO Orthopedic Surgery Schedule an appointment as soon as possible for a visit   [address] [facility]  [address] [address]  [address]             Discharge Medication List as of 5/19/2023  5:48 AM      START taking these medications    Details   meloxicam (Mobic) 15 mg tablet Take 1 tablet (15 mg total) by mouth daily for 7 days, Starting Fri 5/19/2023, Until Fri 5/26/2023, Normal         CONTINUE these medications which have NOT CHANGED    Details   ciprofloxacin-dexamethasone (CIPRODEX) otic suspension Administer 4 drops into both ears 2 (two) times a day for 5 days, Starting Tue 9/20/2022, Until Sun 9/25/2022, Normal      famotidine (PEPCID) 20 mg tablet Take 1 tablet (20 mg total) by mouth daily, Starting Sat 3/4/2023, Until Mon 4/3/2023, Normal      fluticasone (FLONASE) 50 mcg/act nasal spray 2 sprays into each nostril daily, Starting Wed 4/12/2023, Normal      levocetirizine (XYZAL) 5 MG tablet Take 1 tablet (5 mg total) by mouth every evening, Starting Thu 5/13/2021, Normal      lisinopril (ZESTRIL) 10 mg tablet Take 1 tablet by mouth once daily, Normal      loratadine (CLARITIN) 10 mg tablet Take 1 tablet (10 mg total) by mouth daily, Starting Wed 4/12/2023, Normal      Omega-3 1000 MG CAPS Take by mouth in the morning, Historical Med      predniSONE 10 mg tablet 4 pills for 3 days, then 3 pills for 3 days, then 2 pills for 3 days, then 1 pills for 3 days, then stop, Normal      Red Yeast Rice 600 MG TABS Take by mouth in the morning, Historical Med      Xarelto 10 MG tablet Take 1 tablet by mouth once daily, Normal             No discharge procedures on file      PDMP Review     None          ED Provider  Electronically Signed by           Nai Whiteside MD  05/19/23 1431

## 2023-05-19 NOTE — DISCHARGE INSTRUCTIONS
Take Mobic as prescribed for 7 days  You may also take tylenol every 6 hours as needed for additional pain relief      Follow-up with orthopedics, return for any worsening symptoms including worsening pain, if you develop any redness or overlying skin changes, fevers, or any other concerning symptoms

## 2023-05-31 ENCOUNTER — OFFICE VISIT (OUTPATIENT)
Dept: URGENT CARE | Facility: CLINIC | Age: 53
End: 2023-05-31

## 2023-05-31 VITALS
HEART RATE: 92 BPM | OXYGEN SATURATION: 97 % | SYSTOLIC BLOOD PRESSURE: 141 MMHG | RESPIRATION RATE: 18 BRPM | TEMPERATURE: 97.5 F | DIASTOLIC BLOOD PRESSURE: 90 MMHG

## 2023-05-31 DIAGNOSIS — H60.502 ACUTE OTITIS EXTERNA OF LEFT EAR, UNSPECIFIED TYPE: Primary | ICD-10-CM

## 2023-05-31 NOTE — PROGRESS NOTES
[facility]    NAME: Primo Barker is a 45 y o  male  : 1970    MRN: [id number]  DATE: May 31, 2023  TIME: 10:27 AM    Assessment and Plan   Acute otitis externa of left ear, unspecified type [H60 502]  1  Acute otitis externa of left ear, unspecified type  neomycin-polymyxin-hydrocortisone (CORTISPORIN) otic solution          Patient Instructions     Patient Instructions   Drops as directed  If not improving, follow up with ENT  Chief Complaint     Chief Complaint   Patient presents with   • Earache     Left ear pain started Saturday has been trying flonase and clartin       History of Present Illness   45year old male here with complaint of left ear pain on and off for the past 5 days  Hurts to touch it  No cough, nasal congestion or cold symptoms  Review of Systems   Review of Systems   Constitutional: Negative for chills, fatigue and fever  HENT: Positive for ear pain  Negative for congestion, postnasal drip, sinus pressure and sore throat  Respiratory: Negative for cough, shortness of breath and wheezing  Neurological: Negative for headaches  All other systems reviewed and are negative        Current Medications     Current Outpatient Medications:   •  neomycin-polymyxin-hydrocortisone (CORTISPORIN) otic solution, Administer 4 drops into the left ear every 6 (six) hours for 7 days, Disp: 10 mL, Rfl: 0  •  famotidine (PEPCID) 20 mg tablet, Take 1 tablet (20 mg total) by mouth daily, Disp: 30 tablet, Rfl: 0  •  fluticasone (FLONASE) 50 mcg/act nasal spray, 2 sprays into each nostril daily, Disp: 1 g, Rfl: 0  •  levocetirizine (XYZAL) 5 MG tablet, Take 1 tablet (5 mg total) by mouth every evening (Patient not taking: Reported on 10/24/2022), Disp: 30 tablet, Rfl: 11  •  lisinopril (ZESTRIL) 10 mg tablet, Take 1 tablet by mouth once daily, Disp: 90 tablet, Rfl: 0  •  loratadine (CLARITIN) 10 mg tablet, Take 1 tablet (10 mg total) by mouth daily, Disp: 30 tablet, Rfl: 0  • meloxicam (Mobic) 15 mg tablet, Take 1 tablet (15 mg total) by mouth daily for 7 days, Disp: 7 tablet, Rfl: 0  •  Omega-3 1000 MG CAPS, Take by mouth in the morning, Disp: , Rfl:   •  predniSONE 10 mg tablet, 4 pills for 3 days, then 3 pills for 3 days, then 2 pills for 3 days, then 1 pills for 3 days, then stop, Disp: 30 tablet, Rfl: 0  •  Red Yeast Rice 600 MG TABS, Take by mouth in the morning, Disp: , Rfl:   •  Xarelto 10 MG tablet, Take 1 tablet by mouth once daily, Disp: 30 tablet, Rfl: 6    Current Allergies     Allergies as of 05/31/2023 - Reviewed 05/31/2023   Allergen Reaction Noted   • Penicillins  05/30/2019   • Iodine - food allergy Other (See Comments) 12/30/2016          The following portions of the patient's history were reviewed and updated as appropriate: allergies, current medications, past family history, past medical history, past social history, past surgical history and problem list    Past Medical History:   Diagnosis Date   • Allergic    • BPH with obstruction/lower urinary tract symptoms    • COVID-19 01/2021   • Deep vein thrombosis (DVT) (HCC)    • Erectile dysfunction due to arterial insufficiency    • Hypertension    • No known health problems     history of denial of any significant medical , no pertinent past medical history per allscripts   • Testicular hypogonadism    • Varicocele      Past Surgical History:   Procedure Laterality Date   • EYE SURGERY     • INGUINAL HERNIA REPAIR     • UMBILICAL HERNIA REPAIR      per allscripts   • VASECTOMY     • VEIN LIGATION AND STRIPPING       Family History   Problem Relation Age of Onset   • Heart disease Mother    • Heart attack Mother    • Breast cancer Mother    • Hypertension Mother    • Cerebral aneurysm Mother    • Prostate cancer Father    • Bone cancer Father    • Diabetes type II Father    • Testicular cancer Cousin    • Coronary artery disease Maternal Grandmother    • Colon cancer Paternal Grandmother    • No Known Problems Son • No Known Problems Son      Social History     Socioeconomic History   • Marital status: /Civil Union     Spouse name: Not on file   • Number of children: 2   • Years of education: Not on file   • Highest education level: Not on file   Occupational History   • Occupation:    Tobacco Use   • Smoking status: Never   • Smokeless tobacco: Never   Vaping Use   • Vaping Use: Never used   Substance and Sexual Activity   • Alcohol use: Yes     Comment: Occasionally   • Drug use: No   • Sexual activity: Yes     Partners: Female   Other Topics Concern   • Not on file   Social History Narrative    Daily caffeine use- 6 cups of coffee     Social Determinants of Health     Financial Resource Strain: Not on file   Food Insecurity: Not on file   Transportation Needs: Not on file   Physical Activity: Not on file   Stress: Not on file   Social Connections: Not on file   Intimate Partner Violence: Not on file   Housing Stability: Not on file     Medications have been verified  Objective   /90   Pulse 92   Temp 97 5 °F (36 4 °C)   Resp 18   SpO2 97%      Physical Exam   Physical Exam  Vitals and nursing note reviewed  Constitutional:       General: He is not in acute distress  Appearance: He is well-developed  HENT:      Head: Normocephalic and atraumatic  Right Ear: Tympanic membrane normal       Left Ear: Tympanic membrane normal  Tenderness present  Nose: No mucosal edema or congestion  Mouth/Throat:      Mouth: Mucous membranes are moist       Pharynx: No posterior oropharyngeal erythema  Cardiovascular:      Rate and Rhythm: Normal rate and regular rhythm  Heart sounds: Normal heart sounds  Pulmonary:      Effort: Pulmonary effort is normal  No respiratory distress  Breath sounds: Normal breath sounds

## 2023-06-06 DIAGNOSIS — Z86.718 HISTORY OF DVT (DEEP VEIN THROMBOSIS): ICD-10-CM

## 2023-06-06 RX ORDER — RIVAROXABAN 10 MG/1
TABLET, FILM COATED ORAL
Qty: 30 TABLET | Refills: 0 | Status: SHIPPED | OUTPATIENT
Start: 2023-06-06

## 2023-06-09 ENCOUNTER — OFFICE VISIT (OUTPATIENT)
Dept: OBGYN CLINIC | Facility: CLINIC | Age: 53
End: 2023-06-09
Payer: COMMERCIAL

## 2023-06-09 VITALS
BODY MASS INDEX: 31 KG/M2 | HEART RATE: 86 BPM | SYSTOLIC BLOOD PRESSURE: 124 MMHG | HEIGHT: 73 IN | DIASTOLIC BLOOD PRESSURE: 83 MMHG

## 2023-06-09 DIAGNOSIS — M75.32 CALCIFIC TENDINITIS OF LEFT SHOULDER: Primary | ICD-10-CM

## 2023-06-09 PROCEDURE — 20610 DRAIN/INJ JOINT/BURSA W/O US: CPT | Performed by: PHYSICIAN ASSISTANT

## 2023-06-09 PROCEDURE — 99213 OFFICE O/P EST LOW 20 MIN: CPT | Performed by: PHYSICIAN ASSISTANT

## 2023-06-09 RX ORDER — BUPIVACAINE HYDROCHLORIDE 2.5 MG/ML
4 INJECTION, SOLUTION INFILTRATION; PERINEURAL
Status: COMPLETED | OUTPATIENT
Start: 2023-06-09 | End: 2023-06-09

## 2023-06-09 RX ORDER — TRIAMCINOLONE ACETONIDE 40 MG/ML
80 INJECTION, SUSPENSION INTRA-ARTICULAR; INTRAMUSCULAR
Status: COMPLETED | OUTPATIENT
Start: 2023-06-09 | End: 2023-06-09

## 2023-06-09 RX ADMIN — TRIAMCINOLONE ACETONIDE 80 MG: 40 INJECTION, SUSPENSION INTRA-ARTICULAR; INTRAMUSCULAR at 10:30

## 2023-06-09 RX ADMIN — BUPIVACAINE HYDROCHLORIDE 4 ML: 2.5 INJECTION, SOLUTION INFILTRATION; PERINEURAL at 10:30

## 2023-06-09 NOTE — PROGRESS NOTES
ASSESSMENT/PLAN:    Diagnoses and all orders for this visit:    Calcific tendinitis of left shoulder    Other orders  -     Large joint arthrocentesis        X-rays of the patient's left shoulder are negative for any fractures or dislocations  He does have calcific tendinitis along his left shoulder  Possible treatment options were discussed with the patient  He elected for corticosteroid injection  The patient's left shoulder was injected with Kenalog and Marcaine  He tolerated the injection quite well  He follow-up with our office in 2 months  Return in about 2 months (around 8/9/2023)  Patient has calcific tendinitis along his left shoulder  Under aseptic technique, the left shoulder was injected with Kenalog and Marcaine  He tolerated seizure quite well  Return back to months evaluation  If his condition changes, he would not hesitate to let us know      _____________________________________________________  CHIEF COMPLAINT:  Chief Complaint   Patient presents with   • Left Shoulder - Pain         SUBJECTIVE:  Ezekiel Miles is a 45 y o  male who presents to our office complaining of left shoulder pain  The patient states this pain developed approximately 3 weeks ago  He denies any new falls or trauma  He went to the emergency department on 5/19/2023 where x-rays were performed which are consistent with calcific tendinitis  He denies any numbness or tingling  He denies any fever or chills      The following portions of the patient's history were reviewed and updated as appropriate: allergies, current medications, past family history, past medical history, past social history, past surgical history and problem list     PAST MEDICAL HISTORY:  Past Medical History:   Diagnosis Date   • Allergic    • BPH with obstruction/lower urinary tract symptoms    • COVID-19 01/2021   • Deep vein thrombosis (DVT) ([facility] [address] )    • Erectile dysfunction due to arterial insufficiency    • Hypertension    • No known health problems     history of denial of any significant medical , no pertinent past medical history per allscripts   • Testicular hypogonadism    • Varicocele        PAST SURGICAL HISTORY:  Past Surgical History:   Procedure Laterality Date   • EYE SURGERY     • INGUINAL HERNIA REPAIR     • UMBILICAL HERNIA REPAIR      per allscripts   • VASECTOMY     • VEIN LIGATION AND STRIPPING         FAMILY HISTORY:  Family History   Problem Relation Age of Onset   • Heart disease Mother    • Heart attack Mother    • Breast cancer Mother    • Hypertension Mother    • Cerebral aneurysm Mother    • Prostate cancer Father    • Bone cancer Father    • Diabetes type II Father    • Testicular cancer Cousin    • Coronary artery disease Maternal Grandmother    • Colon cancer Paternal Grandmother    • No Known Problems Son    • No Known Problems Son        SOCIAL HISTORY:  Social History     Tobacco Use   • Smoking status: Never   • Smokeless tobacco: Never   Vaping Use   • Vaping Use: Never used   Substance Use Topics   • Alcohol use: Yes     Comment: Occasionally   • Drug use: No       MEDICATIONS:    Current Outpatient Medications:   •  fluticasone (FLONASE) 50 mcg/act nasal spray, 2 sprays into each nostril daily, Disp: 1 g, Rfl: 0  •  lisinopril (ZESTRIL) 10 mg tablet, Take 1 tablet by mouth once daily, Disp: 90 tablet, Rfl: 0  •  loratadine (CLARITIN) 10 mg tablet, Take 1 tablet (10 mg total) by mouth daily, Disp: 30 tablet, Rfl: 0  •  Omega-3 1000 MG CAPS, Take by mouth in the morning, Disp: , Rfl:   •  Red Yeast Rice 600 MG TABS, Take by mouth in the morning, Disp: , Rfl:   •  Xarelto 10 MG tablet, Take 1 tablet by mouth once daily, Disp: 30 tablet, Rfl: 0  •  famotidine (PEPCID) 20 mg tablet, Take 1 tablet (20 mg total) by mouth daily, Disp: 30 tablet, Rfl: 0  •  levocetirizine (XYZAL) 5 MG tablet, Take 1 tablet (5 mg total) by mouth every evening (Patient not taking: Reported on 10/24/2022), Disp: 30 tablet, Rfl: 11  • "meloxicam (Mobic) 15 mg tablet, Take 1 tablet (15 mg total) by mouth daily for 7 days, Disp: 7 tablet, Rfl: 0  •  neomycin-polymyxin-hydrocortisone (CORTISPORIN) otic solution, Administer 4 drops into the left ear every 6 (six) hours for 7 days, Disp: 10 mL, Rfl: 0  •  predniSONE 10 mg tablet, 4 pills for 3 days, then 3 pills for 3 days, then 2 pills for 3 days, then 1 pills for 3 days, then stop (Patient not taking: Reported on 6/9/2023), Disp: 30 tablet, Rfl: 0    ALLERGIES:  Allergies   Allergen Reactions   • Penicillins    • Iodine - Food Allergy Other (See Comments)     Mild allergic like reaction to iohexol (self limited sensation of throat tightening, not requiring treatment)  ROS:  Review of Systems     Constitutional: Negative for fatigue, fever or loss of appetite  HENT: Negative  Respiratory: Negative for shortness of breath, dyspnea  Cardiovascular: Negative for chest pain/tightness  Gastrointestinal: Negative for abdominal pain, N/V  Endocrine: Negative for cold/heat intolerance, unexplained weight loss/gain  Genitourinary: Negative for flank pain, dysuria, hematuria  Musculoskeletal: Positive for arthralgia   Skin: Negative for rash  Neurological: Negative for numbness or tingling  Psychiatric/Behavioral: Negative for agitation  _____________________________________________________  PHYSICAL EXAMINATION:    Blood pressure 124/83, pulse 86, height 6' 1\" (1 854 m)  Constitutional: Oriented to person, place, and time  Appears well-developed and well-nourished  No distress  HENT:   Head: Normocephalic  Eyes: Conjunctivae are normal  Right eye exhibits no discharge  Left eye exhibits no discharge  No scleral icterus  Cardiovascular: Normal rate  Pulmonary/Chest: Effort normal    Neurological: Alert and oriented to person, place, and time  Skin: Skin is warm and dry  No rash noted  Not diaphoretic  No erythema  No pallor  Psychiatric: Normal mood and affect   " "Behavior is normal  Judgment and thought content normal       MUSCULOSKELETAL EXAMINATION:   Physical Exam  Ortho Exam    Left upper extremity is neurovascularly intact  Fingers are pink and mobile  Compartments are soft  Range of motion of the shoulder is from 0 to 170 degrees of forward flexion and abduction  Rotator cuff strength 5 out of 5  Brisk cap refill  Sensation intact  Mild signs of impingement  Objective:  BP Readings from Last 1 Encounters:   06/09/23 124/83      Wt Readings from Last 1 Encounters:   05/19/23 107 kg (235 lb)        BMI:   Estimated body mass index is 31 kg/m² as calculated from the following:    Height as of this encounter: 6' 1\" (1 854 m)  Weight as of 5/19/23: 107 kg (235 lb)  PROCEDURES PERFORMED:  Large joint arthrocentesis: L subacromial bursa  Universal Protocol:  Risks and benefits: risks, benefits and alternatives were discussed  Consent given by: patient  Patient understanding: patient states understanding of the procedure being performed  Site marked: the operative site was marked  Patient identity confirmed: verbally with patient    Supporting Documentation  Indications: pain   Procedure Details  Location: shoulder - L subacromial bursa  Preparation: Patient was prepped and draped in the usual sterile fashion  Needle size: 22 G  Ultrasound guidance: no  Approach: lateral  Medications administered: 4 mL bupivacaine 0 25 %; 80 mg triamcinolone acetonide 40 mg/mL    Patient tolerance: patient tolerated the procedure well with no immediate complications  Dressing:  Sterile dressing applied            Scribe Attestation    I,:  Valerio Jacob PA-C am acting as a scribe while in the presence of the attending physician :       I,:  Cooper Borrego, DO personally performed the services described in this documentation    as scribed in my presence  :         "

## 2023-06-30 DIAGNOSIS — I10 BENIGN ESSENTIAL HYPERTENSION: ICD-10-CM

## 2023-06-30 DIAGNOSIS — J30.1 SEASONAL ALLERGIC RHINITIS DUE TO POLLEN: ICD-10-CM

## 2023-06-30 RX ORDER — LISINOPRIL 10 MG/1
TABLET ORAL
Qty: 90 TABLET | Refills: 0 | Status: SHIPPED | OUTPATIENT
Start: 2023-06-30

## 2023-07-24 ENCOUNTER — OFFICE VISIT (OUTPATIENT)
Dept: OBGYN CLINIC | Facility: CLINIC | Age: 53
End: 2023-07-24
Payer: COMMERCIAL

## 2023-07-24 VITALS
SYSTOLIC BLOOD PRESSURE: 124 MMHG | TEMPERATURE: 80 F | BODY MASS INDEX: 31.14 KG/M2 | WEIGHT: 235 LBS | HEIGHT: 73 IN | DIASTOLIC BLOOD PRESSURE: 87 MMHG

## 2023-07-24 DIAGNOSIS — M75.32 CALCIFIC TENDINITIS OF LEFT SHOULDER: Primary | ICD-10-CM

## 2023-07-24 PROCEDURE — 99213 OFFICE O/P EST LOW 20 MIN: CPT | Performed by: ORTHOPAEDIC SURGERY

## 2023-07-24 NOTE — PROGRESS NOTES
ASSESSMENT/PLAN:    Diagnoses and all orders for this visit:    Calcific tendinitis of left shoulder    Left shoulder doing well. X-rays reviewed of Left shoulder and Physical exam performed B/L shoulders. Discussed cont with strengthening with HEP for both shoulders. May F/U for Right shoulder weakness if his symptoms worsen of fail to resolve  All questions answered to pt satisfaction. If pt’s conditions changes, they will not hesitate to let us know    Return if symptoms worsen or fail to improve. The patient is doing quite well in regards to his calcific tendinitis of his left shoulder. There is full strength full motion and no pain. There was some diffuse tenderness along his right shoulder. He was instructed perform home exercise program.  If his right shoulder pain continues, he will return back to the office        _____________________________________________________  CHIEF COMPLAINT:  Chief Complaint   Patient presents with   • Left Shoulder - Follow-up         SUBJECTIVE:  Solitario Berman is a 45 y.o. male who presents to our office for follow up of left shoulder pain. Pt states his left shoulder is doing well since last visit. He states he has been working out at home and he has no pain in his Left shoulder. He does have complaints of weakness in his Right shoulder. He did have pain but the pain has since resolved but he notices weakness with certain exercises.       The following portions of the patient's history were reviewed and updated as appropriate: allergies, current medications, past family history, past medical history, past social history, past surgical history and problem list.    PAST MEDICAL HISTORY:  Past Medical History:   Diagnosis Date   • Allergic    • BPH with obstruction/lower urinary tract symptoms    • COVID-19 01/2021   • Deep vein thrombosis (DVT) ([address])    • Erectile dysfunction due to arterial insufficiency    • Hypertension    • No known health problems     history of denial of any significant medical , no pertinent past medical history per allscripts   • Testicular hypogonadism    • Varicocele        PAST SURGICAL HISTORY:  Past Surgical History:   Procedure Laterality Date   • EYE SURGERY     • INGUINAL HERNIA REPAIR     • UMBILICAL HERNIA REPAIR      per allscripts   • VASECTOMY     • VEIN LIGATION AND STRIPPING         FAMILY HISTORY:  Family History   Problem Relation Age of Onset   • Heart disease Mother    • Heart attack Mother    • Breast cancer Mother    • Hypertension Mother    • Cerebral aneurysm Mother    • Prostate cancer Father    • Bone cancer Father    • Diabetes type II Father    • Testicular cancer Cousin    • Coronary artery disease Maternal Grandmother    • Colon cancer Paternal Grandmother    • No Known Problems Son    • No Known Problems Son        SOCIAL HISTORY:  Social History     Tobacco Use   • Smoking status: Never   • Smokeless tobacco: Never   Vaping Use   • Vaping Use: Never used   Substance Use Topics   • Alcohol use: Yes     Comment: Occasionally   • Drug use: No       MEDICATIONS:    Current Outpatient Medications:   •  fluticasone (FLONASE) 50 mcg/act nasal spray, 2 sprays into each nostril daily, Disp: 1 g, Rfl: 0  •  lisinopril (ZESTRIL) 10 mg tablet, Take 1 tablet by mouth once daily, Disp: 90 tablet, Rfl: 0  •  loratadine (CLARITIN) 10 mg tablet, Take 1 tablet (10 mg total) by mouth daily, Disp: 30 tablet, Rfl: 0  •  Omega-3 1000 MG CAPS, Take by mouth in the morning, Disp: , Rfl:   •  Red Yeast Rice 600 MG TABS, Take by mouth in the morning, Disp: , Rfl:   •  Xarelto 10 MG tablet, Take 1 tablet by mouth once daily, Disp: 90 tablet, Rfl: 1  •  famotidine (PEPCID) 20 mg tablet, Take 1 tablet (20 mg total) by mouth daily, Disp: 30 tablet, Rfl: 0  •  levocetirizine (XYZAL) 5 MG tablet, Take 1 tablet (5 mg total) by mouth every evening (Patient not taking: Reported on 10/24/2022), Disp: 30 tablet, Rfl: 11  •  meloxicam (Mobic) 15 mg tablet, Take 1 tablet (15 mg total) by mouth daily for 7 days, Disp: 7 tablet, Rfl: 0  •  neomycin-polymyxin-hydrocortisone (CORTISPORIN) otic solution, Administer 4 drops into the left ear every 6 (six) hours for 7 days, Disp: 10 mL, Rfl: 0  •  predniSONE 10 mg tablet, 4 pills for 3 days, then 3 pills for 3 days, then 2 pills for 3 days, then 1 pills for 3 days, then stop (Patient not taking: Reported on 6/9/2023), Disp: 30 tablet, Rfl: 0    ALLERGIES:  Allergies   Allergen Reactions   • Penicillins    • Iodine - Food Allergy Other (See Comments)     Mild allergic like reaction to iohexol (self limited sensation of throat tightening, not requiring treatment). ROS:  Review of Systems   Constitutional: Negative for chills and fever. HENT: Negative for ear pain and sore throat. Eyes: Negative for pain and visual disturbance. Respiratory: Negative for cough and shortness of breath. Cardiovascular: Negative for chest pain and palpitations. Gastrointestinal: Negative for abdominal pain and vomiting. Genitourinary: Negative for dysuria and hematuria. Musculoskeletal: Positive for arthralgias. Negative for back pain. Skin: Negative for color change and rash. Neurological: Negative for seizures and syncope. All other systems reviewed and are negative. Constitutional: Negative for fatigue, fever or loss of appetite. HENT: Negative. Respiratory: Negative for shortness of breath, dyspnea. Cardiovascular: Negative for chest pain/tightness. Gastrointestinal: Negative for abdominal pain, N/V. Endocrine: Negative for cold/heat intolerance, unexplained weight loss/gain. Genitourinary: Negative for flank pain, dysuria, hematuria. Musculoskeletal: Positive for arthralgia   Skin: Negative for rash. Neurological: Negative for numbness or tingling  Psychiatric/Behavioral: Negative for agitation.   _____________________________________________________  PHYSICAL EXAMINATION:    Blood pressure 124/87, temperature (!) 80 °F (26.7 °C), height 6' 1" (1.854 m), weight 107 kg (235 lb). Constitutional: Oriented to person, place, and time. Appears well-developed and well-nourished. No distress. HENT:   Head: Normocephalic. Eyes: Conjunctivae are normal. Right eye exhibits no discharge. Left eye exhibits no discharge. No scleral icterus. Cardiovascular: Normal rate. Pulmonary/Chest: Effort normal.   Neurological: Alert and oriented to person, place, and time. Skin: Skin is warm and dry. No rash noted. Not diaphoretic. No erythema. No pallor. Psychiatric: Normal mood and affect. Behavior is normal. Judgment and thought content normal.      MUSCULOSKELETAL EXAMINATION:   Physical Exam  Eyes:      Pupils: Pupils are equal, round, and reactive to light. Pulmonary:      Effort: Pulmonary effort is normal.      Breath sounds: Normal breath sounds. Skin:     General: Skin is warm and dry. Neurological:      Mental Status: He is alert and oriented to person, place, and time. Psychiatric:         Behavior: Behavior normal.         Thought Content: Thought content normal.         Judgment: Judgment normal.       Right Shoulder Exam   Right shoulder exam is normal.    Range of Motion   The patient has normal right shoulder ROM. Muscle Strength   The patient has normal right shoulder strength. Other   Sensation: normal  Pulse: present      Left Shoulder Exam   Left shoulder exam is normal.    Range of Motion   The patient has normal left shoulder ROM. Muscle Strength   The patient has normal left shoulder strength.     Other   Sensation: normal  Pulse: present           B/L upper extremity is neurovascularly intact  Brisk cap refill  Sensation intact  Fingers are pink and mobile  Compartments are soft    Objective:  BP Readings from Last 1 Encounters:   07/24/23 124/87      Wt Readings from Last 1 Encounters:   07/24/23 107 kg (235 lb)        BMI:   Estimated body mass index is 31 kg/m² as calculated from the following:    Height as of this encounter: 6' 1" (1.854 m). Weight as of this encounter: 107 kg (235 lb).       PROCEDURES PERFORMED:  Procedures      Scribe Attestation    I,:  Ashtyn Mars am acting as a scribe while in the presence of the attending physician.:       I,:  Dilia Velasco, DO personally performed the services described in this documentation    as scribed in my presence.:

## 2023-08-04 ENCOUNTER — APPOINTMENT (EMERGENCY)
Dept: NON INVASIVE DIAGNOSTICS | Facility: HOSPITAL | Age: 53
End: 2023-08-04
Payer: COMMERCIAL

## 2023-08-04 ENCOUNTER — HOSPITAL ENCOUNTER (EMERGENCY)
Facility: HOSPITAL | Age: 53
Discharge: HOME | End: 2023-08-04
Attending: EMERGENCY MEDICINE
Payer: COMMERCIAL

## 2023-08-04 VITALS
SYSTOLIC BLOOD PRESSURE: 153 MMHG | HEART RATE: 97 BPM | DIASTOLIC BLOOD PRESSURE: 91 MMHG | OXYGEN SATURATION: 97 % | RESPIRATION RATE: 17 BRPM | TEMPERATURE: 97.6 F

## 2023-08-04 DIAGNOSIS — I80.9 SUPERFICIAL THROMBOPHLEBITIS: Primary | ICD-10-CM

## 2023-08-04 LAB
ANION GAP SERPL CALCULATED.3IONS-SCNC: 8 MMOL/L
APTT PPP: 32 SECONDS (ref 23–37)
BASOPHILS # BLD AUTO: 0.04 THOUSANDS/ÂΜL (ref 0–0.1)
BASOPHILS NFR BLD AUTO: 1 % (ref 0–1)
BUN SERPL-MCNC: 20 MG/DL (ref 5–25)
CALCIUM SERPL-MCNC: 9.2 MG/DL (ref 8.4–10.2)
CHLORIDE SERPL-SCNC: 102 MMOL/L (ref 96–108)
CO2 SERPL-SCNC: 28 MMOL/L (ref 21–32)
CREAT SERPL-MCNC: 1.19 MG/DL (ref 0.6–1.3)
EOSINOPHIL # BLD AUTO: 0.08 THOUSAND/ÂΜL (ref 0–0.61)
EOSINOPHIL NFR BLD AUTO: 1 % (ref 0–6)
ERYTHROCYTE [DISTWIDTH] IN BLOOD BY AUTOMATED COUNT: 12.6 % (ref 11.6–15.1)
GFR SERPL CREATININE-BSD FRML MDRD: 68 ML/MIN/1.73SQ M
GLUCOSE SERPL-MCNC: 93 MG/DL (ref 65–140)
HCT VFR BLD AUTO: 47.3 % (ref 36.5–49.3)
HGB BLD-MCNC: 16 G/DL (ref 12–17)
IMM GRANULOCYTES # BLD AUTO: 0.02 THOUSAND/UL (ref 0–0.2)
IMM GRANULOCYTES NFR BLD AUTO: 0 % (ref 0–2)
INR PPP: 1.03 (ref 0.84–1.19)
LYMPHOCYTES # BLD AUTO: 1.74 THOUSANDS/ÂΜL (ref 0.6–4.47)
LYMPHOCYTES NFR BLD AUTO: 24 % (ref 14–44)
MAGNESIUM SERPL-MCNC: 1.9 MG/DL (ref 1.9–2.7)
MCH RBC QN AUTO: 32.3 PG (ref 26.8–34.3)
MCHC RBC AUTO-ENTMCNC: 33.8 G/DL (ref 31.4–37.4)
MCV RBC AUTO: 96 FL (ref 82–98)
MONOCYTES # BLD AUTO: 0.86 THOUSAND/ÂΜL (ref 0.17–1.22)
MONOCYTES NFR BLD AUTO: 12 % (ref 4–12)
NEUTROPHILS # BLD AUTO: 4.64 THOUSANDS/ÂΜL (ref 1.85–7.62)
NEUTS SEG NFR BLD AUTO: 62 % (ref 43–75)
NRBC BLD AUTO-RTO: 0 /100 WBCS
PLATELET # BLD AUTO: 209 THOUSANDS/UL (ref 149–390)
PMV BLD AUTO: 8.8 FL (ref 8.9–12.7)
POTASSIUM SERPL-SCNC: 4 MMOL/L (ref 3.5–5.3)
PROTHROMBIN TIME: 13.5 SECONDS (ref 11.6–14.5)
RBC # BLD AUTO: 4.95 MILLION/UL (ref 3.88–5.62)
SODIUM SERPL-SCNC: 138 MMOL/L (ref 135–147)
WBC # BLD AUTO: 7.38 THOUSAND/UL (ref 4.31–10.16)

## 2023-08-04 PROCEDURE — 80048 BASIC METABOLIC PNL TOTAL CA: CPT | Performed by: EMERGENCY MEDICINE

## 2023-08-04 PROCEDURE — 83735 ASSAY OF MAGNESIUM: CPT | Performed by: EMERGENCY MEDICINE

## 2023-08-04 PROCEDURE — 85610 PROTHROMBIN TIME: CPT | Performed by: EMERGENCY MEDICINE

## 2023-08-04 PROCEDURE — 93971 EXTREMITY STUDY: CPT | Performed by: SURGERY

## 2023-08-04 PROCEDURE — 85025 COMPLETE CBC W/AUTO DIFF WBC: CPT | Performed by: EMERGENCY MEDICINE

## 2023-08-04 PROCEDURE — 85730 THROMBOPLASTIN TIME PARTIAL: CPT | Performed by: EMERGENCY MEDICINE

## 2023-08-04 PROCEDURE — 99284 EMERGENCY DEPT VISIT MOD MDM: CPT

## 2023-08-04 PROCEDURE — 93971 EXTREMITY STUDY: CPT

## 2023-08-04 PROCEDURE — 36415 COLL VENOUS BLD VENIPUNCTURE: CPT | Performed by: EMERGENCY MEDICINE

## 2023-08-04 NOTE — ED PROVIDER NOTES
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 Patient says that he was working at a desk and was immobile for approximately 3 hours and started noticing the cramp. He describes dull ache on the left lateral calf. He is on Xarelto and has been compliant. Denies chest pain or shortness of breath. Prior to Admission Medications   Prescriptions Last Dose Informant Patient Reported? Taking?    Omega-3 1000 MG CAPS  Self Yes No   Sig: Take by mouth in the morning   Red Yeast Rice 600 MG TABS  Self Yes No   Sig: Take by mouth in the morning   Xarelto 10 MG tablet   No No   Sig: Take 1 tablet by mouth once daily   famotidine (PEPCID) 20 mg tablet   No No   Sig: Take 1 tablet (20 mg total) by mouth daily   fluticasone (FLONASE) 50 mcg/act nasal spray   No No   Si sprays into each nostril daily   levocetirizine (XYZAL) 5 MG tablet   No No   Sig: Take 1 tablet (5 mg total) by mouth every evening   Patient not taking: Reported on 10/24/2022   lisinopril (ZESTRIL) 10 mg tablet   No No   Sig: Take 1 tablet by mouth once daily   loratadine (CLARITIN) 10 mg tablet   No No   Sig: Take 1 tablet (10 mg total) by mouth daily   meloxicam (Mobic) 15 mg tablet   No No   Sig: Take 1 tablet (15 mg total) by mouth daily for 7 days   neomycin-polymyxin-hydrocortisone (CORTISPORIN) otic solution   No No   Sig: Administer 4 drops into the left ear every 6 (six) hours for 7 days   predniSONE 10 mg tablet   No No   Si pills for 3 days, then 3 pills for 3 days, then 2 pills for 3 days, then 1 pills for 3 days, then stop   Patient not taking: Reported on 2023      Facility-Administered Medications: None       Past Medical History:   Diagnosis Date   • Allergic    • BPH with obstruction/lower urinary tract symptoms    • COVID-19 01/2021   • Deep vein thrombosis (DVT) (HCC)    • Erectile dysfunction due to arterial insufficiency    • Hypertension    • No known health problems     history of denial of any significant medical , no pertinent past medical history per allscripts   • Testicular hypogonadism    • Varicocele        Past Surgical History:   Procedure Laterality Date   • EYE SURGERY     • INGUINAL HERNIA REPAIR     • UMBILICAL HERNIA REPAIR      per allscripts   • VASECTOMY     • VEIN LIGATION AND STRIPPING         Family History   Problem Relation Age of Onset   • Heart disease Mother    • Heart attack Mother    • Breast cancer Mother    • Hypertension Mother    • Cerebral aneurysm Mother    • Prostate cancer Father    • Bone cancer Father    • Diabetes type II Father    • Testicular cancer Cousin    • Coronary artery disease Maternal Grandmother    • Colon cancer Paternal Grandmother    • No Known Problems Son    • No Known Problems Son      I have reviewed and agree with the history as documented. E-Cigarette/Vaping   • E-Cigarette Use Never User      E-Cigarette/Vaping Substances   • Nicotine No    • THC No    • CBD No    • Flavoring No    • Other No    • Unknown No      Social History     Tobacco Use   • Smoking status: Never   • Smokeless tobacco: Never   Vaping Use   • Vaping Use: Never used   Substance Use Topics   • Alcohol use: Yes     Comment: Occasionally   • Drug use: No       Review of Systems    Physical Exam  Physical Exam  Vitals and nursing note reviewed. Constitutional:       General: He is not in acute distress. Appearance: Normal appearance. HENT:      Head: Normocephalic and atraumatic. Nose: Nose normal.   Eyes:      General:         Right eye: No discharge. Left eye: No discharge. Cardiovascular:      Rate and Rhythm: Normal rate and regular rhythm.    Pulmonary:      Effort: Pulmonary effort is normal. No respiratory distress. Abdominal:      General: Abdomen is flat. There is no distension. Musculoskeletal:         General: No deformity. Normal range of motion. Comments: Varicose veins left lower extremity, tenderness mid calf, no erythema   Skin:     General: Skin is warm. Findings: No rash. Neurological:      Mental Status: He is alert and oriented to person, place, and time. Motor: No weakness. Psychiatric:         Mood and Affect: Mood normal.         Behavior: Behavior normal.         Vital Signs  ED Triage Vitals [08/04/23 0606]   Temperature Pulse Respirations Blood Pressure SpO2   97.6 °F (36.4 °C) 100 18 144/88 95 %      Temp Source Heart Rate Source Patient Position - Orthostatic VS BP Location FiO2 (%)   Oral Monitor Sitting Left arm --      Pain Score       2           Vitals:    08/04/23 0606 08/04/23 0628   BP: 144/88    Pulse: 100 92   Patient Position - Orthostatic VS: Sitting          Visual Acuity      ED Medications  Medications - No data to display    Diagnostic Studies  Results Reviewed     Procedure Component Value Units Date/Time    CBC and differential [[id number]]     Lab Status: No result Specimen: Blood     Protime-INR [[id number]]     Lab Status: No result Specimen: Blood     APTT [[id number]]     Lab Status: No result Specimen: Blood     Basic metabolic panel [[id number]]     Lab Status: No result Specimen: Blood     Magnesium [[id number]]     Lab Status: No result Specimen: Blood                  VAS lower limb venous duplex study, unilateral/limited    (Results Pending)              Procedures  Procedures         ED Course                                             Medical Decision Making  78-year-old male with history of DVT presents with left lower calf pain. Consistent with cellulitis or infection  History and exam concerning for DVT. He does have varicose veins there however he states that is chronic.   He is on DOAC so possible failure. Vascular study ordered. Due to patient's current anticoagulation blood work including CBC, chemistry and coags were ordered to evaluate renal function, platelet count, and hemoglobin count. It is, unfortunately off hours for a duplex study, patient is comfortable waiting in the ER as this would only be an 60-90 minutes    Amount and/or Complexity of Data Reviewed  Labs: ordered. Disposition  Final diagnoses:   None     ED Disposition     None      Follow-up Information    None         Patient's Medications   Discharge Prescriptions    No medications on file       No discharge procedures on file.     PDMP Review     None          ED Provider  Electronically Signed by Making  57-year-old male with history of DVT presents with left lower calf pain. Consistent with cellulitis or infection  History and exam concerning for DVT. He does have varicose veins there however he states that is chronic. He is on DOAC so possible failure. Vascular study ordered. Due to patient's current anticoagulation blood work including CBC, chemistry and coags were ordered to evaluate renal function, platelet count, and hemoglobin count. It is, unfortunately off hours for a duplex study, patient is comfortable waiting in the ER as this would only be an 60-90 minutes    Superficial thrombophlebitis: acute illness or injury  Amount and/or Complexity of Data Reviewed  Labs: ordered. Decision-making details documented in ED Course. Disposition  Final diagnoses:   Superficial thrombophlebitis     Time reflects when diagnosis was documented in both MDM as applicable and the Disposition within this note     Time User Action Codes Description Comment    8/4/2023  9:45 AM Winsome Lozada [I80.9] Superficial thrombophlebitis       ED Disposition     ED Disposition   Discharge    Condition   Stable    Date/Time   Fri Aug 4, 2023  9:45 AM    Comment   Hudson Gibson discharge to home/self care.                Follow-up Information    None         Discharge Medication List as of 8/4/2023  9:57 AM      CONTINUE these medications which have NOT CHANGED    Details   famotidine (PEPCID) 20 mg tablet Take 1 tablet (20 mg total) by mouth daily, Starting Sat 3/4/2023, Until Mon 4/3/2023, Normal      fluticasone (FLONASE) 50 mcg/act nasal spray 2 sprays into each nostril daily, Starting Wed 4/12/2023, Normal      levocetirizine (XYZAL) 5 MG tablet Take 1 tablet (5 mg total) by mouth every evening, Starting Thu 5/13/2021, Normal      lisinopril (ZESTRIL) 10 mg tablet Take 1 tablet by mouth once daily, Normal      loratadine (CLARITIN) 10 mg tablet Take 1 tablet (10 mg total) by mouth daily, Starting Wed 4/12/2023, Normal      meloxicam (Mobic) 15 mg tablet Take 1 tablet (15 mg total) by mouth daily for 7 days, Starting Fri 5/19/2023, Until Fri 5/26/2023, Normal      neomycin-polymyxin-hydrocortisone (CORTISPORIN) otic solution Administer 4 drops into the left ear every 6 (six) hours for 7 days, Starting Wed 5/31/2023, Until Wed 6/7/2023, Normal      Omega-3 1000 MG CAPS Take by mouth in the morning, Historical Med      predniSONE 10 mg tablet 4 pills for 3 days, then 3 pills for 3 days, then 2 pills for 3 days, then 1 pills for 3 days, then stop, Normal      Red Yeast Rice 600 MG TABS Take by mouth in the morning, Historical Med      Xarelto 10 MG tablet Take 1 tablet by mouth once daily, Normal                 PDMP Review     None          ED Provider  Electronically Signed by           Francisco Roberts DO  08/08/23 3331

## 2023-08-04 NOTE — DISCHARGE INSTRUCTIONS
Warm compresses to the leg 4-6 times a day for 10 to 15 minutes at a time. Vascular surgery notes no other change in medication at this time. I would recommend that you follow-up with vascular surgery for repeat evaluation and to make sure that this improves. I have created a ambulatory referral where our scheduling people will try to contact you to set this up. If you start getting chest pain or worsening swelling or other concerning issues return to the ER.

## 2023-08-05 NOTE — ED CARE HANDOFF
Emergency Department Sign Out Note        Sign out and transfer of care from Dr Abigail Ramirez. See Separate Emergency Department note. The patient, America Vallejo, was evaluated by the previous provider for calf pain and swelling with history of DVT in the past, patient is currently on oral anticoagulants. .    Workup Completed:  Patient had an emergency department work-up prior to my evaluation which consisted of a BMP, magnesium, coagulation studies and CBC. Vascular ultrasound is pending. ED Course / Workup Pending (followup): Patient's vascular ultrasound was negative for DVT but positive for superficial thrombophlebitis. Patient's heart is regular rate and rhythm without clicks rubs or gallops and lungs are clear. O2 sat of 97%. The patient appears to be in no acute distress on my evaluation. Findings referred to vascular service regarding superficial thrombophlebitis on oral anticoagulants, and the recommendation was warm compresses at this time only. Doses per Dr. Katie You. This was relayed to the patient and he was discharged. An ambulatory referral to vascular surgery was done for follow-up. The patient was told to return if worse and discharged. ED Course as of 08/05/23 0735   Fri Aug 04, 2023   9260 Received in signout. Patient with positive Ollis Mate' sign and calf tenderness with history of DVT in the past, on Xaralto. Patient been compliant with medication however has had increased sedentary lifestyle with his new job. 5874 - Ultrasound came to the emergency department, and notes that the patient has no evidence of DVT but does have a superficial thrombophlebitis. 3158 Case discussed with vascular surgery who notes that with a superficial thrombophlebitis, the treatment right now will only be warm compresses and no changes to his medical therapy. This was per Dr. Katie You .      Procedures  MDM        Disposition  Final diagnoses:   Superficial thrombophlebitis     Time reflects when diagnosis was documented in both MDM as applicable and the Disposition within this note     Time User Action Codes Description Comment    8/4/2023  9:45 AM Elissa Allen Neville [I80.9] Superficial thrombophlebitis       ED Disposition     ED Disposition   Discharge    Condition   Stable    Date/Time   Fri Aug 4, 2023  9:45 AM    Comment   Paulette Gibson discharge to home/self care.                Follow-up Information    None       Discharge Medication List as of 8/4/2023  9:57 AM      CONTINUE these medications which have NOT CHANGED    Details   famotidine (PEPCID) 20 mg tablet Take 1 tablet (20 mg total) by mouth daily, Starting Sat 3/4/2023, Until Mon 4/3/2023, Normal      fluticasone (FLONASE) 50 mcg/act nasal spray 2 sprays into each nostril daily, Starting Wed 4/12/2023, Normal      levocetirizine (XYZAL) 5 MG tablet Take 1 tablet (5 mg total) by mouth every evening, Starting Thu 5/13/2021, Normal      lisinopril (ZESTRIL) 10 mg tablet Take 1 tablet by mouth once daily, Normal      loratadine (CLARITIN) 10 mg tablet Take 1 tablet (10 mg total) by mouth daily, Starting Wed 4/12/2023, Normal      meloxicam (Mobic) 15 mg tablet Take 1 tablet (15 mg total) by mouth daily for 7 days, Starting Fri 5/19/2023, Until Fri 5/26/2023, Normal      neomycin-polymyxin-hydrocortisone (CORTISPORIN) otic solution Administer 4 drops into the left ear every 6 (six) hours for 7 days, Starting Wed 5/31/2023, Until Wed 6/7/2023, Normal      Omega-3 1000 MG CAPS Take by mouth in the morning, Historical Med      predniSONE 10 mg tablet 4 pills for 3 days, then 3 pills for 3 days, then 2 pills for 3 days, then 1 pills for 3 days, then stop, Normal      Red Yeast Rice 600 MG TABS Take by mouth in the morning, Historical Med      Xarelto 10 MG tablet Take 1 tablet by mouth once daily, Normal                  ED Provider  Electronically Signed by     Betzaida Gamez., DO  08/05/23 1110

## 2023-08-08 ENCOUNTER — APPOINTMENT (OUTPATIENT)
Dept: LAB | Facility: CLINIC | Age: 53
End: 2023-08-08
Payer: COMMERCIAL

## 2023-08-08 DIAGNOSIS — I10 BENIGN ESSENTIAL HYPERTENSION: ICD-10-CM

## 2023-08-08 DIAGNOSIS — Z12.5 SCREENING FOR PROSTATE CANCER: ICD-10-CM

## 2023-08-08 DIAGNOSIS — E78.00 HYPERCHOLESTEROLEMIA: ICD-10-CM

## 2023-08-08 LAB
ALBUMIN SERPL BCP-MCNC: 3.9 G/DL (ref 3.5–5)
ALP SERPL-CCNC: 55 U/L (ref 46–116)
ALT SERPL W P-5'-P-CCNC: 35 U/L (ref 12–78)
ANION GAP SERPL CALCULATED.3IONS-SCNC: 7 MMOL/L
AST SERPL W P-5'-P-CCNC: 17 U/L (ref 5–45)
BASOPHILS # BLD AUTO: 0.04 THOUSANDS/ÂΜL (ref 0–0.1)
BASOPHILS NFR BLD AUTO: 1 % (ref 0–1)
BILIRUB SERPL-MCNC: 0.79 MG/DL (ref 0.2–1)
BUN SERPL-MCNC: 18 MG/DL (ref 5–25)
CALCIUM SERPL-MCNC: 9.7 MG/DL (ref 8.3–10.1)
CHLORIDE SERPL-SCNC: 109 MMOL/L (ref 96–108)
CHOLEST SERPL-MCNC: 148 MG/DL
CO2 SERPL-SCNC: 26 MMOL/L (ref 21–32)
CREAT SERPL-MCNC: 1.15 MG/DL (ref 0.6–1.3)
EOSINOPHIL # BLD AUTO: 0.09 THOUSAND/ÂΜL (ref 0–0.61)
EOSINOPHIL NFR BLD AUTO: 1 % (ref 0–6)
ERYTHROCYTE [DISTWIDTH] IN BLOOD BY AUTOMATED COUNT: 12.5 % (ref 11.6–15.1)
GFR SERPL CREATININE-BSD FRML MDRD: 71 ML/MIN/1.73SQ M
GLUCOSE P FAST SERPL-MCNC: 101 MG/DL (ref 65–99)
HCT VFR BLD AUTO: 49.2 % (ref 36.5–49.3)
HDLC SERPL-MCNC: 56 MG/DL
HGB BLD-MCNC: 16.3 G/DL (ref 12–17)
IMM GRANULOCYTES # BLD AUTO: 0.01 THOUSAND/UL (ref 0–0.2)
IMM GRANULOCYTES NFR BLD AUTO: 0 % (ref 0–2)
LDLC SERPL CALC-MCNC: 78 MG/DL (ref 0–100)
LYMPHOCYTES # BLD AUTO: 1.55 THOUSANDS/ÂΜL (ref 0.6–4.47)
LYMPHOCYTES NFR BLD AUTO: 24 % (ref 14–44)
MCH RBC QN AUTO: 31.7 PG (ref 26.8–34.3)
MCHC RBC AUTO-ENTMCNC: 33.1 G/DL (ref 31.4–37.4)
MCV RBC AUTO: 96 FL (ref 82–98)
MONOCYTES # BLD AUTO: 0.74 THOUSAND/ÂΜL (ref 0.17–1.22)
MONOCYTES NFR BLD AUTO: 12 % (ref 4–12)
NEUTROPHILS # BLD AUTO: 3.97 THOUSANDS/ÂΜL (ref 1.85–7.62)
NEUTS SEG NFR BLD AUTO: 62 % (ref 43–75)
NONHDLC SERPL-MCNC: 92 MG/DL
NRBC BLD AUTO-RTO: 0 /100 WBCS
PLATELET # BLD AUTO: 230 THOUSANDS/UL (ref 149–390)
PMV BLD AUTO: 9.4 FL (ref 8.9–12.7)
POTASSIUM SERPL-SCNC: 4.5 MMOL/L (ref 3.5–5.3)
PROT SERPL-MCNC: 7.4 G/DL (ref 6.4–8.4)
PSA SERPL-MCNC: 0.4 NG/ML (ref 0–4)
RBC # BLD AUTO: 5.15 MILLION/UL (ref 3.88–5.62)
SODIUM SERPL-SCNC: 142 MMOL/L (ref 135–147)
TRIGL SERPL-MCNC: 69 MG/DL
TSH SERPL DL<=0.05 MIU/L-ACNC: 1.64 UIU/ML (ref 0.45–4.5)
WBC # BLD AUTO: 6.4 THOUSAND/UL (ref 4.31–10.16)

## 2023-08-08 PROCEDURE — G0103 PSA SCREENING: HCPCS

## 2023-08-08 PROCEDURE — 84443 ASSAY THYROID STIM HORMONE: CPT

## 2023-08-08 PROCEDURE — 80061 LIPID PANEL: CPT

## 2023-08-08 PROCEDURE — 36415 COLL VENOUS BLD VENIPUNCTURE: CPT

## 2023-08-08 PROCEDURE — 85025 COMPLETE CBC W/AUTO DIFF WBC: CPT

## 2023-08-08 PROCEDURE — 80053 COMPREHEN METABOLIC PANEL: CPT

## 2023-08-10 ENCOUNTER — CONSULT (OUTPATIENT)
Dept: VASCULAR SURGERY | Facility: CLINIC | Age: 53
End: 2023-08-10
Payer: COMMERCIAL

## 2023-08-10 VITALS
WEIGHT: 240 LBS | HEIGHT: 73 IN | OXYGEN SATURATION: 100 % | HEART RATE: 90 BPM | DIASTOLIC BLOOD PRESSURE: 68 MMHG | BODY MASS INDEX: 31.81 KG/M2 | SYSTOLIC BLOOD PRESSURE: 142 MMHG

## 2023-08-10 DIAGNOSIS — I80.9 SUPERFICIAL THROMBOPHLEBITIS: Primary | ICD-10-CM

## 2023-08-10 DIAGNOSIS — I82.4Y3 DEEP VEIN THROMBOSIS (DVT) OF PROXIMAL VEIN OF BOTH LOWER EXTREMITIES, UNSPECIFIED CHRONICITY (HCC): Chronic | ICD-10-CM

## 2023-08-10 PROCEDURE — 99203 OFFICE O/P NEW LOW 30 MIN: CPT | Performed by: SURGERY

## 2023-08-10 NOTE — LETTER
August 10, 2023     [address] [address]    Patient: Juni Reddy   YOB: 1970   Date of Visit: 8/10/2023       Dear Dr. Ariana Birmingham:    Thank you for referring Javier Ruiz to me for evaluation. Below are the relevant portions of my assessment and plan of care. Superficial thrombophlebitis  Left short saphenous superficial thrombophlebitis and patient with history of recurrent DVT and on Xarelto therapy. This is potentially provoked by long period of immobility/sitting but with his history of recurrent DVT, family history of clotting abnormality in his current Xarelto therapy I would advise further evaluation by his hematologist and recommendations regarding anticoagulation options. In the interval I have discussed with him the importance of conservative modes of therapy to include compressive stockings, elevation and regular exercise/walking. If you have questions, please do not hesitate to call me. I look forward to following Antwan Stanley along with you.          Sincerely,        Milad Hawthorne MD        CC: No Recipients

## 2023-08-10 NOTE — PATIENT INSTRUCTIONS
Superficial thrombophlebitis  Left short saphenous superficial thrombophlebitis and patient with history of recurrent DVT and on Xarelto therapy. This is potentially provoked by long period of immobility/sitting but with his history of recurrent DVT, family history of clotting abnormality in his current Xarelto therapy I would advise further evaluation by his hematologist and recommendations regarding anticoagulation options. In the interval I have discussed with him the importance of conservative modes of therapy to include compressive stockings, elevation and regular exercise/walking.

## 2023-08-10 NOTE — PROGRESS NOTES
Assessment/Plan:    Superficial thrombophlebitis  Left short saphenous superficial thrombophlebitis and patient with history of recurrent DVT and on Xarelto therapy. This is potentially provoked by long period of immobility/sitting but with his history of recurrent DVT, family history of clotting abnormality in his current Xarelto therapy I would advise further evaluation by his hematologist and recommendations regarding anticoagulation options. In the interval I have discussed with him the importance of conservative modes of therapy to include compressive stockings, elevation and regular exercise/walking. Diagnoses and all orders for this visit:    Superficial thrombophlebitis  -     Ambulatory Referral to Vascular Surgery  -     Ambulatory referral to Hematology / Oncology; Future    Deep vein thrombosis (DVT) of proximal vein of both lower extremities, unspecified chronicity ([address])  -     Ambulatory referral to Hematology / Oncology; Future          Subjective:      Patient ID: Jerod Draper is a 45 y.o. male. Patient is new to our office. He was referred here by Dr. Tsering Hugo DO. Patient had a LEV done 8/4/2023. He had a previous DVT - left popliteal vein- 1/13/2021. Patient has a history of Rt GSV ablation done 2013 and left GSV ligation done 2010. He c/o burning pain and redness along his left ankle. Patient wear compression daily. He denies any open wounds. He is taking Xarelto. He is a non-smoker. 24-year-old with long history of venous disease with right venous ablation and left ligation and stripping in 2010 and 2013 at [facility] has had some recurrent varicosities more so on the right as compared to the left for a matter of years. Most recently he had an episode in which she started to experience left calf discomfort after sitting for over 3 hours while working.   He was subsequently evaluated with a duplex and identified a superficial venous thrombosis of the soleus vein. He has been utilizing compressive stockings and he is on chronic Xarelto therapy for his history of multiple previous DVTs. 1 DVT was following his venous ablation and the other following COVID. He also notes a family history of blood clots and vein problems. Venous duplex 8/4/2023 with superficial venous thrombosis of the left short saphenous vein without propagation into the deep system. Previous reflux studies did identify residual patency of the right greater saphenous vein though there is evidence of chronic nonocclusive thrombus. The left greater saphenous vein is surgically absent.       The following portions of the patient's history were reviewed and updated as appropriate: allergies, current medications, past family history, past medical history, past social history, past surgical history and problem list     Past Medical History:  Past Medical History:   Diagnosis Date   • Allergic    • BPH with obstruction/lower urinary tract symptoms    • COVID-19 01/2021   • Deep vein thrombosis (DVT) ([address])    • Erectile dysfunction due to arterial insufficiency    • Hypertension    • No known health problems     history of denial of any significant medical , no pertinent past medical history per allscripts   • Testicular hypogonadism    • Varicocele        Past Surgical History:  Past Surgical History:   Procedure Laterality Date   • EYE SURGERY     • INGUINAL HERNIA REPAIR     • UMBILICAL HERNIA REPAIR      per allscripts   • VASECTOMY     • VEIN LIGATION AND STRIPPING         Social History:  Social History     Substance and Sexual Activity   Alcohol Use Yes    Comment: Occasionally     Social History     Substance and Sexual Activity   Drug Use No     Social History     Tobacco Use   Smoking Status Never   Smokeless Tobacco Never       Family History:  Family History   Problem Relation Age of Onset   • Heart disease Mother    • Heart attack Mother    • Breast cancer Mother    • Hypertension Mother    • Cerebral aneurysm Mother    • Prostate cancer Father    • Bone cancer Father    • Diabetes type II Father    • Testicular cancer Cousin    • Coronary artery disease Maternal Grandmother    • Colon cancer Paternal Grandmother    • No Known Problems Son    • No Known Problems Son        Allergies: Allergies   Allergen Reactions   • Contrast Dye [Iodinated Contrast Media] Throat Swelling   • Penicillins    • Iodine - Food Allergy Other (See Comments)     Mild allergic like reaction to iohexol (self limited sensation of throat tightening, not requiring treatment).         Medications:    Current Outpatient Medications:   •  fluticasone (FLONASE) 50 mcg/act nasal spray, 2 sprays into each nostril daily, Disp: 1 g, Rfl: 0  •  lisinopril (ZESTRIL) 10 mg tablet, Take 1 tablet by mouth once daily, Disp: 90 tablet, Rfl: 0  •  loratadine (CLARITIN) 10 mg tablet, Take 1 tablet (10 mg total) by mouth daily, Disp: 30 tablet, Rfl: 0  •  Omega-3 1000 MG CAPS, Take by mouth in the morning, Disp: , Rfl:   •  Red Yeast Rice 600 MG TABS, Take by mouth in the morning, Disp: , Rfl:   •  Xarelto 10 MG tablet, Take 1 tablet by mouth once daily, Disp: 90 tablet, Rfl: 1  •  famotidine (PEPCID) 20 mg tablet, Take 1 tablet (20 mg total) by mouth daily, Disp: 30 tablet, Rfl: 0  •  levocetirizine (XYZAL) 5 MG tablet, Take 1 tablet (5 mg total) by mouth every evening (Patient not taking: Reported on 10/24/2022), Disp: 30 tablet, Rfl: 11  •  meloxicam (Mobic) 15 mg tablet, Take 1 tablet (15 mg total) by mouth daily for 7 days, Disp: 7 tablet, Rfl: 0  •  neomycin-polymyxin-hydrocortisone (CORTISPORIN) otic solution, Administer 4 drops into the left ear every 6 (six) hours for 7 days, Disp: 10 mL, Rfl: 0  •  predniSONE 10 mg tablet, 4 pills for 3 days, then 3 pills for 3 days, then 2 pills for 3 days, then 1 pills for 3 days, then stop (Patient not taking: Reported on 6/9/2023), Disp: 30 tablet, Rfl: 0    Vitals:  /68 (08/10/23 1529)    Temp      Pulse 90 (08/10/23 1529)   Resp      SpO2 100 % (08/10/23 1529)      Lab Results and Cultures:   CBC with diff:   Lab Results   Component Value Date    WBC 6.40 08/08/2023    HGB 16.3 08/08/2023    HCT 49.2 08/08/2023    MCV 96 08/08/2023     08/08/2023    RBC 5.15 08/08/2023    MCH 31.7 08/08/2023    MCHC 33.1 08/08/2023    RDW 12.5 08/08/2023    MPV 9.4 08/08/2023    NRBC 0 08/08/2023   ,   BMP/CMP:  Lab Results   Component Value Date    K 4.5 08/08/2023     (H) 08/08/2023    CO2 26 08/08/2023    BUN 18 08/08/2023    CREATININE 1.15 08/08/2023    CALCIUM 9.7 08/08/2023    AST 17 08/08/2023    ALT 35 08/08/2023    ALKPHOS 55 08/08/2023    EGFR 71 08/08/2023   ,   Lipid Panel: No results found for: "CHOL",   Coags:   Lab Results   Component Value Date    PTT 32 08/04/2023    INR 1.03 08/04/2023   ,     . Review of Systems   Constitutional: Negative. HENT: Negative. Eyes: Negative. Respiratory: Negative. Cardiovascular: Negative. Gastrointestinal: Negative. Endocrine: Negative. Genitourinary: Negative. Musculoskeletal:        Pain in his left ankle   Skin: Negative. Allergic/Immunologic: Positive for food allergies. Neurological: Negative. Hematological: Bruises/bleeds easily. Psychiatric/Behavioral: Negative. Objective:      /68 (BP Location: Left arm, Patient Position: Sitting, Cuff Size: Standard)   Pulse 90   Ht 6' 1" (1.854 m)   Wt 109 kg (240 lb)   SpO2 100%   BMI 31.66 kg/m²          Physical Exam  Constitutional:       Appearance: Normal appearance. Cardiovascular:      Rate and Rhythm: Normal rate. Pulses:           Dorsalis pedis pulses are 2+ on the right side and 2+ on the left side. Comments: Varicosities are visible mostly in the right calf 4-7 mm in diameter. There are spider and reticular varicosities in both ankles.   Pulmonary:      Effort: Pulmonary effort is normal.   Musculoskeletal: General: Swelling (Bilateral ankles and calves) present. No tenderness. Normal range of motion. Skin:     General: Skin is warm and dry. Comments: No obvious palpable cord or erythema. Neurological:      General: No focal deficit present. Mental Status: He is alert and oriented to person, place, and time.       Gait: Gait normal.   Psychiatric:         Mood and Affect: Mood normal.

## 2023-08-10 NOTE — ASSESSMENT & PLAN NOTE
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 This is potentially provoked by long period of immobility/sitting but with his history of recurrent DVT, family history of clotting abnormality in his current Xarelto therapy I would advise further evaluation by his hematologist and recommendations regarding anticoagulation options. In the interval I have discussed with him the importance of conservative modes of therapy to include compressive stockings, elevation and regular exercise/walking.

## 2023-08-11 ENCOUNTER — OFFICE VISIT (OUTPATIENT)
Dept: UROLOGY | Facility: MEDICAL CENTER | Age: 53
End: 2023-08-11
Payer: COMMERCIAL

## 2023-08-11 ENCOUNTER — TELEPHONE (OUTPATIENT)
Dept: HEMATOLOGY ONCOLOGY | Facility: CLINIC | Age: 53
End: 2023-08-11

## 2023-08-11 VITALS
WEIGHT: 239 LBS | HEIGHT: 73 IN | SYSTOLIC BLOOD PRESSURE: 120 MMHG | DIASTOLIC BLOOD PRESSURE: 80 MMHG | OXYGEN SATURATION: 97 % | BODY MASS INDEX: 31.68 KG/M2 | HEART RATE: 96 BPM

## 2023-08-11 DIAGNOSIS — N13.9 BENIGN LOCALIZED HYPERPLASIA OF PROSTATE WITH URINARY OBSTRUCTION AND LOWER URINARY TRACT SYMPTOMS: Primary | ICD-10-CM

## 2023-08-11 DIAGNOSIS — N50.89 TESTICULAR MICROLITHIASIS: ICD-10-CM

## 2023-08-11 DIAGNOSIS — N40.1 BENIGN LOCALIZED HYPERPLASIA OF PROSTATE WITH URINARY OBSTRUCTION AND LOWER URINARY TRACT SYMPTOMS: Primary | ICD-10-CM

## 2023-08-11 DIAGNOSIS — N52.9 ERECTILE DYSFUNCTION, UNSPECIFIED ERECTILE DYSFUNCTION TYPE: ICD-10-CM

## 2023-08-11 PROCEDURE — 99214 OFFICE O/P EST MOD 30 MIN: CPT | Performed by: UROLOGY

## 2023-08-11 RX ORDER — SILDENAFIL 25 MG/1
TABLET, FILM COATED ORAL
Qty: 30 TABLET | Refills: 1 | Status: SHIPPED | OUTPATIENT
Start: 2023-08-11

## 2023-08-11 NOTE — ASSESSMENT & PLAN NOTE
Both testes are retractile and difficult to examine. The right testicle might be undescended. 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 We will check a scrotal ultrasound at this time.

## 2023-08-11 NOTE — PROGRESS NOTES
Assessment/Plan:    Benign localized hyperplasia of prostate with urinary obstruction and lower urinary tract symptoms  AUA symptom score is 5. He is pleased with his voiding pattern. Urinalysis is negative and digital rectal examination is benign in nature. PSA was 0.4 on August 8, 2023. We will continue to follow his voiding pattern with watchful waiting. He will return in 1 year and we will recheck a urinalysis and PSA prior. Male erectile dysfunction  Options were discussed. We will try Viagra 25 mg. Can increase as needed to a maximum of 4 tablets (100 mg). Use and side effects discussed. Testicular microlithiasis  Both testes are retractile and difficult to examine. The right testicle might be undescended. 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 We will check a scrotal ultrasound at this time. Diagnoses and all orders for this visit:    Benign localized hyperplasia of prostate with urinary obstruction and lower urinary tract symptoms  -     PSA Total, Diagnostic; Future  -     Urinalysis with microscopic; Future    Erectile dysfunction, unspecified erectile dysfunction type  -     sildenafil (VIAGRA) 25 MG tablet; 1 to 2 tablets p.o. daily as needed    Testicular microlithiasis  -     US scrotum and testicles; Future          Subjective:      Patient ID: Salome Barnett is a 45 y.o. male. Benign Prostatic Hypertrophy  This is a chronic problem. The current episode started more than 1 year ago. The problem is unchanged. Irritative symptoms do not include frequency or urgency. Nocturia x 1. Obstructive symptoms do not include dribbling, incomplete emptying, an intermittent stream, a slower stream, straining or a weak stream. occasional slow stream. Pertinent negatives include no chills, dysuria, genital pain, hematuria, hesitancy, nausea or vomiting. AUA score is 0-7. He is sexually active. Nothing aggravates the symptoms. Past treatments include nothing.    Erectile Dysfunction  This is a chronic problem. The current episode started more than 1 year ago. The problem is unchanged. The nature of his difficulty is maintaining erection. He reports no decreased libido. Irritative symptoms do not include frequency or urgency. Nocturia x 1. Obstructive symptoms do not include dribbling, incomplete emptying, an intermittent stream, a slower stream, straining or a weak stream. occasional slow stream. Pertinent negatives include no chills, dysuria, genital pain, hematuria or hesitancy. The symptoms are aggravated by stress. Past treatments include sildenafil. The treatment provided significant relief. He has had abnormal vision and flushing caused by medications. Risk factors include BPH and hypertension. He had a period of decreased sexual activity as his wife had breast cancer. He is now interested in resuming sexual activity- he would like to try viagra again. The following portions of the patient's history were reviewed and updated as appropriate: allergies, current medications, past family history, past medical history, past social history, past surgical history and problem list.    Review of Systems   Constitutional: Negative. Negative for chills, diaphoresis, fatigue and fever. He's regained his weight   HENT: Negative. Eyes: Negative. Respiratory: Negative. Cardiovascular: Negative. Gastrointestinal: Negative. Negative for nausea and vomiting. Endocrine: Negative. Genitourinary: Negative for decreased libido, dysuria, frequency, hematuria, hesitancy, incomplete emptying and urgency. See HPI   Musculoskeletal: Negative. Skin: Negative. Allergic/Immunologic: Negative. Neurological: Negative. Hematological: Negative. Psychiatric/Behavioral: Negative. AUA SYMPTOM SCORE      Most Recent Value   AUA SYMPTOM SCORE    How often have you had a sensation of not emptying your bladder completely after you finished urinating?  1 (P)     How often have you had to urinate again less than two hours after you finished urinating? 1 (P)     How often have you found you stopped and started again several times when you urinate? 0 (P)     How often have you found it difficult to postpone urination? 0 (P)     How often have you had a weak urinary stream? 1 (P)     How often have you had to push or strain to begin urination? 0 (P)     How many times did you most typically get up to urinate from the time you went to bed at night until the time you got up in the morning? 1-2  (P)     Quality of Life: If you were to spend the rest of your life with your urinary condition just the way it is now, how would you feel about that? 0     AUA SYMPTOM SCORE 5 (P)           Objective:      /80 (BP Location: Left arm, Patient Position: Sitting, Cuff Size: Standard)   Pulse 96   Ht 6' 1" (1.854 m)   Wt 108 kg (239 lb)   SpO2 97%   BMI 31.53 kg/m²          Physical Exam  Constitutional:       Appearance: He is well-developed. HENT:      Head: Normocephalic and atraumatic. Eyes:      Conjunctiva/sclera: Conjunctivae normal.   Cardiovascular:      Rate and Rhythm: Normal rate. Pulmonary:      Effort: Pulmonary effort is normal.   Abdominal:      General: Bowel sounds are normal. There is no distension. Palpations: Abdomen is soft. There is no mass. Tenderness: There is no abdominal tenderness. There is no right CVA tenderness, left CVA tenderness, guarding or rebound. Hernia: No hernia is present. Genitourinary:     Penis: Normal. No phimosis or hypospadias. Testes: Normal.         Right: Mass not present. Left: Mass not present. Rectum: Normal.      Comments: Both testes were retractile in nature, Left testicle normal to palpation. Could not bring right testicle down and very tender- unable to examine well. Prostate 1+ enlarged and palpably benign. Musculoskeletal:         General: Normal range of motion. Cervical back: Neck supple. Skin:     General: Skin is warm and dry. Neurological:      General: No focal deficit present. Mental Status: He is alert and oriented to person, place, and time. Psychiatric:         Mood and Affect: Mood normal.         Behavior: Behavior normal.         Thought Content:  Thought content normal.         Judgment: Judgment normal.

## 2023-08-11 NOTE — ASSESSMENT & PLAN NOTE
Options were discussed. We will try Viagra 25 mg. Can increase as needed to a maximum of 4 tablets (100 mg). Use and side effects discussed.

## 2023-08-11 NOTE — ASSESSMENT & PLAN NOTE
AUA symptom score is 5. He is pleased with his voiding pattern. Urinalysis is negative and digital rectal examination is benign in nature. PSA was 0.4 on August 8, 2023. We will continue to follow his voiding pattern with watchful waiting. He will return in 1 year and we will recheck a urinalysis and PSA prior.

## 2023-08-11 NOTE — TELEPHONE ENCOUNTER
I called Susan Cotter in response to a referral that was received for patient to establish care with Hematology. Outreach was made to schedule an appointment. Patient last saw Samantha Wang in 2021 and may schedule as a follow up with her or a ALYX with any other provider. I left a voicemail explaining the reason for my call and advised patient to call [facility] at [phone]. Another attempt will be made to contact patient.

## 2023-08-16 ENCOUNTER — TELEPHONE (OUTPATIENT)
Dept: HEMATOLOGY ONCOLOGY | Facility: CLINIC | Age: 53
End: 2023-08-16

## 2023-08-16 NOTE — TELEPHONE ENCOUNTER
Appointment Schedule   Who are you speaking with? Patient   If it is not the patient, are they listed on an active communication consent form? N/A   Which provider is the appointment scheduled with? ULISES Kulkarni   At which location is the appointment scheduled for? [place]   When is the appointment scheduled? Please list date and time 10/11/23 9:30AM   What is the reason for this appointment? Follow Up   Did patient voice understanding of the details of this appointment? Yes   Was the no show policy reviewed with patient?  Yes

## 2023-08-25 ENCOUNTER — RA CDI HCC (OUTPATIENT)
Dept: OTHER | Facility: HOSPITAL | Age: 53
End: 2023-08-25

## 2023-08-25 NOTE — PROGRESS NOTES
[address] [facility] coding opportunities          Chart Reviewed number of suggestions sent to Provider: 1   K65.4  See 1/3/23 imaging study     Patients Insurance        Commercial Insurance: [employer]

## 2023-08-30 NOTE — PATIENT INSTRUCTIONS
Wellness Visit for Adults   AMBULATORY CARE:   A wellness visit  is when you see your healthcare provider to get screened for health problems. Your healthcare provider will also give you advice on how to stay healthy. Write down your questions so you remember to ask them. Ask your healthcare provider how often you should have a wellness visit. What happens at a wellness visit:  Your healthcare provider will ask about your health, and your family history of health problems. This includes high blood pressure, heart disease, and cancer. He or she will ask if you have symptoms that concern you, if you smoke, and about your mood. You may also be asked about your intake of medicines, supplements, food, and alcohol. Any of the following may be done:  • Your weight  will be checked. Your height may also be checked so your body mass index (BMI) can be calculated. Your BMI shows if you are at a healthy weight. • Your blood pressure  and heart rate will be checked. Your temperature may also be checked. • Blood and urine tests  may be done. Blood tests may be done to check your cholesterol levels. Abnormal cholesterol levels increase your risk for heart disease and stroke. You may also need a blood or urine test to check for diabetes if you are at increased risk. Urine tests may be done to look for signs of an infection or kidney disease. • A physical exam  includes checking your heartbeat and lungs with a stethoscope. Your healthcare provider may also check your skin to look for sun damage. • Screening tests  may be recommended. A screening test is done to check for diseases that may not cause symptoms. The screening tests you may need depend on your age, gender, family history, and lifestyle habits. For example, colorectal screening may be recommended if you are 48years old or older. Screening tests you need if you are a woman:   • A Pap smear  is used to screen for cervical cancer.  Pap smears are usually done every 3 to 5 years depending on your age. You may need them more often if you have had abnormal Pap smear test results in the past. Ask your healthcare provider how often you should have a Pap smear. • A mammogram  is an x-ray of your breasts to screen for breast cancer. Experts recommend mammograms every 2 years starting at age 48 years. You may need a mammogram at age 52 years or younger if you have an increased risk for breast cancer. Talk to your healthcare provider about when you should start having mammograms and how often you need them. Vaccines you may need:   • Get an influenza vaccine  every year. The influenza vaccine protects you from the flu. Several types of viruses cause the flu. The viruses change over time, so new vaccines are made each year. • Get a tetanus-diphtheria (Td) booster vaccine  every 10 years. This vaccine protects you against tetanus and diphtheria. Tetanus is a severe infection that may cause painful muscle spasms and lockjaw. Diphtheria is a severe bacterial infection that causes a thick covering in the back of your mouth and throat. • Get a human papillomavirus (HPV) vaccine  if you are female and aged 23 to 32 or male 23 to 24 and never received it. This vaccine protects you from HPV infection. HPV is the most common infection spread by sexual contact. HPV may also cause vaginal, penile, and anal cancers. • Get a pneumococcal vaccine  if you are aged 72 years or older. The pneumococcal vaccine is an injection given to protect you from pneumococcal disease. Pneumococcal disease is an infection caused by pneumococcal bacteria. The infection may cause pneumonia, meningitis, or an ear infection. • Get a shingles vaccine  if you are 60 or older, even if you have had shingles before. The shingles vaccine is an injection to protect you from the varicella-zoster virus. This is the same virus that causes chickenpox.  Shingles is a painful rash that develops in people who had chickenpox or have been exposed to the virus. How to eat healthy:  My Plate is a model for planning healthy meals. It shows the types and amounts of foods that should go on your plate. Fruits and vegetables make up about half of your plate, and grains and protein make up the other half. A serving of dairy is included on the side of your plate. The amount of calories and serving sizes you need depends on your age, gender, weight, and height. Examples of healthy foods are listed below:  • Eat a variety of vegetables  such as dark green, red, and orange vegetables. You can also include canned vegetables low in sodium (salt) and frozen vegetables without added butter or sauces. • Eat a variety of fresh fruits , canned fruit in 100% juice, frozen fruit, and dried fruit. • Include whole grains. At least half of the grains you eat should be whole grains. Examples include whole-wheat bread, wheat pasta, brown rice, and whole-grain cereals such as oatmeal.    • Eat a variety of protein foods such as seafood (fish and shellfish), lean meat, and poultry without skin (turkey and chicken). Examples of lean meats include pork leg, shoulder, or tenderloin, and beef round, sirloin, tenderloin, and extra lean ground beef. Other protein foods include eggs and egg substitutes, beans, peas, soy products, nuts, and seeds. • Choose low-fat dairy products such as skim or 1% milk or low-fat yogurt, cheese, and cottage cheese. • Limit unhealthy fats  such as butter, hard margarine, and shortening. Exercise:  Exercise at least 30 minutes per day on most days of the week. Some examples of exercise include walking, biking, dancing, and swimming. You can also fit in more physical activity by taking the stairs instead of the elevator or parking farther away from stores. Include muscle strengthening activities 2 days each week. Regular exercise provides many health benefits.  It helps you manage your weight, and decreases your risk for type 2 diabetes, heart disease, stroke, and high blood pressure. Exercise can also help improve your mood. Ask your healthcare provider about the best exercise plan for you. General health and safety guidelines:   • Do not smoke. Nicotine and other chemicals in cigarettes and cigars can cause lung damage. Ask your healthcare provider for information if you currently smoke and need help to quit. E-cigarettes or smokeless tobacco still contain nicotine. Talk to your healthcare provider before you use these products. • Limit alcohol. A drink of alcohol is 12 ounces of beer, 5 ounces of wine, or 1½ ounces of liquor. • Lose weight, if needed. Being overweight increases your risk of certain health conditions. These include heart disease, high blood pressure, type 2 diabetes, and certain types of cancer. • Protect your skin. Do not sunbathe or use tanning beds. Use sunscreen with a SPF 15 or higher. Apply sunscreen at least 15 minutes before you go outside. Reapply sunscreen every 2 hours. Wear protective clothing, hats, and sunglasses when you are outside. • Drive safely. Always wear your seatbelt. Make sure everyone in your car wears a seatbelt. A seatbelt can save your life if you are in an accident. Do not use your cell phone when you are driving. This could distract you and cause an accident. Pull over if you need to make a call or send a text message. • Practice safe sex. Use latex condoms if are sexually active and have more than one partner. Your healthcare provider may recommend screening tests for sexually transmitted infections (STIs). • Wear helmets, lifejackets, and protective gear. Always wear a helmet when you ride a bike or motorcycle, go skiing, or play sports that could cause a head injury. Wear protective equipment when you play sports. Wear a lifejacket when you are on a boat or doing water sports.     © Copyright [employer] 2022 Information is for End User's use only and may not be sold, redistributed or otherwise used for commercial purposes. The above information is an  only. It is not intended as medical advice for individual conditions or treatments. Talk to your doctor, nurse or pharmacist before following any medical regimen to see if it is safe and effective for you. Wellness Visit for Adults   AMBULATORY CARE:   A wellness visit  is when you see your healthcare provider to get screened for health problems. Your healthcare provider will also give you advice on how to stay healthy. Write down your questions so you remember to ask them. Ask your healthcare provider how often you should have a wellness visit. What happens at a wellness visit:  Your healthcare provider will ask about your health, and your family history of health problems. This includes high blood pressure, heart disease, and cancer. He or she will ask if you have symptoms that concern you, if you smoke, and about your mood. You may also be asked about your intake of medicines, supplements, food, and alcohol. Any of the following may be done:  • Your weight  will be checked. Your height may also be checked so your body mass index (BMI) can be calculated. Your BMI shows if you are at a healthy weight. • Your blood pressure  and heart rate will be checked. Your temperature may also be checked. • Blood and urine tests  may be done. Blood tests may be done to check your cholesterol levels. Abnormal cholesterol levels increase your risk for heart disease and stroke. You may also need a blood or urine test to check for diabetes if you are at increased risk. Urine tests may be done to look for signs of an infection or kidney disease. • A physical exam  includes checking your heartbeat and lungs with a stethoscope. Your healthcare provider may also check your skin to look for sun damage. • Screening tests  may be recommended.  A screening test is done to check for diseases that may not cause symptoms. The screening tests you may need depend on your age, gender, family history, and lifestyle habits. For example, colorectal screening may be recommended if you are 48years old or older. Screening tests you need if you are a woman:   • A Pap smear  is used to screen for cervical cancer. Pap smears are usually done every 3 to 5 years depending on your age. You may need them more often if you have had abnormal Pap smear test results in the past. Ask your healthcare provider how often you should have a Pap smear. • A mammogram  is an x-ray of your breasts to screen for breast cancer. Experts recommend mammograms every 2 years starting at age 48 years. You may need a mammogram at age 52 years or younger if you have an increased risk for breast cancer. Talk to your healthcare provider about when you should start having mammograms and how often you need them. Vaccines you may need:   • Get an influenza vaccine  every year. The influenza vaccine protects you from the flu. Several types of viruses cause the flu. The viruses change over time, so new vaccines are made each year. • Get a tetanus-diphtheria (Td) booster vaccine  every 10 years. This vaccine protects you against tetanus and diphtheria. Tetanus is a severe infection that may cause painful muscle spasms and lockjaw. Diphtheria is a severe bacterial infection that causes a thick covering in the back of your mouth and throat. • Get a human papillomavirus (HPV) vaccine  if you are female and aged 23 to 32 or male 23 to 24 and never received it. This vaccine protects you from HPV infection. HPV is the most common infection spread by sexual contact. HPV may also cause vaginal, penile, and anal cancers. • Get a pneumococcal vaccine  if you are aged 72 years or older. The pneumococcal vaccine is an injection given to protect you from pneumococcal disease.  Pneumococcal disease is an infection caused by pneumococcal bacteria. The infection may cause pneumonia, meningitis, or an ear infection. • Get a shingles vaccine  if you are 60 or older, even if you have had shingles before. The shingles vaccine is an injection to protect you from the varicella-zoster virus. This is the same virus that causes chickenpox. Shingles is a painful rash that develops in people who had chickenpox or have been exposed to the virus. How to eat healthy:  My Plate is a model for planning healthy meals. It shows the types and amounts of foods that should go on your plate. Fruits and vegetables make up about half of your plate, and grains and protein make up the other half. A serving of dairy is included on the side of your plate. The amount of calories and serving sizes you need depends on your age, gender, weight, and height. Examples of healthy foods are listed below:  • Eat a variety of vegetables  such as dark green, red, and orange vegetables. You can also include canned vegetables low in sodium (salt) and frozen vegetables without added butter or sauces. • Eat a variety of fresh fruits , canned fruit in 100% juice, frozen fruit, and dried fruit. • Include whole grains. At least half of the grains you eat should be whole grains. Examples include whole-wheat bread, wheat pasta, brown rice, and whole-grain cereals such as oatmeal.    • Eat a variety of protein foods such as seafood (fish and shellfish), lean meat, and poultry without skin (turkey and chicken). Examples of lean meats include pork leg, shoulder, or tenderloin, and beef round, sirloin, tenderloin, and extra lean ground beef. Other protein foods include eggs and egg substitutes, beans, peas, soy products, nuts, and seeds. • Choose low-fat dairy products such as skim or 1% milk or low-fat yogurt, cheese, and cottage cheese. • Limit unhealthy fats  such as butter, hard margarine, and shortening.        Exercise:  Exercise at least 30 minutes per day on most days of the week. Some examples of exercise include walking, biking, dancing, and swimming. You can also fit in more physical activity by taking the stairs instead of the elevator or parking farther away from stores. Include muscle strengthening activities 2 days each week. Regular exercise provides many health benefits. It helps you manage your weight, and decreases your risk for type 2 diabetes, heart disease, stroke, and high blood pressure. Exercise can also help improve your mood. Ask your healthcare provider about the best exercise plan for you. General health and safety guidelines:   • Do not smoke. Nicotine and other chemicals in cigarettes and cigars can cause lung damage. Ask your healthcare provider for information if you currently smoke and need help to quit. E-cigarettes or smokeless tobacco still contain nicotine. Talk to your healthcare provider before you use these products. • Limit alcohol. A drink of alcohol is 12 ounces of beer, 5 ounces of wine, or 1½ ounces of liquor. • Lose weight, if needed. Being overweight increases your risk of certain health conditions. These include heart disease, high blood pressure, type 2 diabetes, and certain types of cancer. • Protect your skin. Do not sunbathe or use tanning beds. Use sunscreen with a SPF 15 or higher. Apply sunscreen at least 15 minutes before you go outside. Reapply sunscreen every 2 hours. Wear protective clothing, hats, and sunglasses when you are outside. • Drive safely. Always wear your seatbelt. Make sure everyone in your car wears a seatbelt. A seatbelt can save your life if you are in an accident. Do not use your cell phone when you are driving. This could distract you and cause an accident. Pull over if you need to make a call or send a text message. • Practice safe sex. Use latex condoms if are sexually active and have more than one partner.  Your healthcare provider may recommend screening tests for sexually transmitted infections (STIs). • Wear helmets, lifejackets, and protective gear. Always wear a helmet when you ride a bike or motorcycle, go skiing, or play sports that could cause a head injury. Wear protective equipment when you play sports. Wear a lifejacket when you are on a boat or doing water sports. © Copyright [employer] 2022 Information is for End User's use only and may not be sold, redistributed or otherwise used for commercial purposes. The above information is an  only. It is not intended as medical advice for individual conditions or treatments. Talk to your doctor, nurse or pharmacist before following any medical regimen to see if it is safe and effective for you. Wellness Visit for Adults   AMBULATORY CARE:   A wellness visit  is when you see your healthcare provider to get screened for health problems. Your healthcare provider will also give you advice on how to stay healthy. Write down your questions so you remember to ask them. Ask your healthcare provider how often you should have a wellness visit. What happens at a wellness visit:  Your healthcare provider will ask about your health, and your family history of health problems. This includes high blood pressure, heart disease, and cancer. He or she will ask if you have symptoms that concern you, if you smoke, and about your mood. You may also be asked about your intake of medicines, supplements, food, and alcohol. Any of the following may be done:  • Your weight  will be checked. Your height may also be checked so your body mass index (BMI) can be calculated. Your BMI shows if you are at a healthy weight. • Your blood pressure  and heart rate will be checked. Your temperature may also be checked. • Blood and urine tests  may be done. Blood tests may be done to check your cholesterol levels. Abnormal cholesterol levels increase your risk for heart disease and stroke.  You may also need a blood or urine test to check for diabetes if you are at increased risk. Urine tests may be done to look for signs of an infection or kidney disease. • A physical exam  includes checking your heartbeat and lungs with a stethoscope. Your healthcare provider may also check your skin to look for sun damage. • Screening tests  may be recommended. A screening test is done to check for diseases that may not cause symptoms. The screening tests you may need depend on your age, gender, family history, and lifestyle habits. For example, colorectal screening may be recommended if you are 48years old or older. Screening tests you need if you are a woman:   • A Pap smear  is used to screen for cervical cancer. Pap smears are usually done every 3 to 5 years depending on your age. You may need them more often if you have had abnormal Pap smear test results in the past. Ask your healthcare provider how often you should have a Pap smear. • A mammogram  is an x-ray of your breasts to screen for breast cancer. Experts recommend mammograms every 2 years starting at age 48 years. You may need a mammogram at age 52 years or younger if you have an increased risk for breast cancer. Talk to your healthcare provider about when you should start having mammograms and how often you need them. Vaccines you may need:   • Get an influenza vaccine  every year. The influenza vaccine protects you from the flu. Several types of viruses cause the flu. The viruses change over time, so new vaccines are made each year. • Get a tetanus-diphtheria (Td) booster vaccine  every 10 years. This vaccine protects you against tetanus and diphtheria. Tetanus is a severe infection that may cause painful muscle spasms and lockjaw. Diphtheria is a severe bacterial infection that causes a thick covering in the back of your mouth and throat. • Get a human papillomavirus (HPV) vaccine  if you are female and aged 23 to 32 or male 23 to 24 and never received it.  This vaccine protects you from HPV infection. HPV is the most common infection spread by sexual contact. HPV may also cause vaginal, penile, and anal cancers. • Get a pneumococcal vaccine  if you are aged 72 years or older. The pneumococcal vaccine is an injection given to protect you from pneumococcal disease. Pneumococcal disease is an infection caused by pneumococcal bacteria. The infection may cause pneumonia, meningitis, or an ear infection. • Get a shingles vaccine  if you are 60 or older, even if you have had shingles before. The shingles vaccine is an injection to protect you from the varicella-zoster virus. This is the same virus that causes chickenpox. Shingles is a painful rash that develops in people who had chickenpox or have been exposed to the virus. How to eat healthy:  My Plate is a model for planning healthy meals. It shows the types and amounts of foods that should go on your plate. Fruits and vegetables make up about half of your plate, and grains and protein make up the other half. A serving of dairy is included on the side of your plate. The amount of calories and serving sizes you need depends on your age, gender, weight, and height. Examples of healthy foods are listed below:  • Eat a variety of vegetables  such as dark green, red, and orange vegetables. You can also include canned vegetables low in sodium (salt) and frozen vegetables without added butter or sauces. • Eat a variety of fresh fruits , canned fruit in 100% juice, frozen fruit, and dried fruit. • Include whole grains. At least half of the grains you eat should be whole grains. Examples include whole-wheat bread, wheat pasta, brown rice, and whole-grain cereals such as oatmeal.    • Eat a variety of protein foods such as seafood (fish and shellfish), lean meat, and poultry without skin (turkey and chicken). Examples of lean meats include pork leg, shoulder, or tenderloin, and beef round, sirloin, tenderloin, and extra lean ground beef.  Other protein foods include eggs and egg substitutes, beans, peas, soy products, nuts, and seeds. • Choose low-fat dairy products such as skim or 1% milk or low-fat yogurt, cheese, and cottage cheese. • Limit unhealthy fats  such as butter, hard margarine, and shortening. Exercise:  Exercise at least 30 minutes per day on most days of the week. Some examples of exercise include walking, biking, dancing, and swimming. You can also fit in more physical activity by taking the stairs instead of the elevator or parking farther away from stores. Include muscle strengthening activities 2 days each week. Regular exercise provides many health benefits. It helps you manage your weight, and decreases your risk for type 2 diabetes, heart disease, stroke, and high blood pressure. Exercise can also help improve your mood. Ask your healthcare provider about the best exercise plan for you. General health and safety guidelines:   • Do not smoke. Nicotine and other chemicals in cigarettes and cigars can cause lung damage. Ask your healthcare provider for information if you currently smoke and need help to quit. E-cigarettes or smokeless tobacco still contain nicotine. Talk to your healthcare provider before you use these products. • Limit alcohol. A drink of alcohol is 12 ounces of beer, 5 ounces of wine, or 1½ ounces of liquor. • Lose weight, if needed. Being overweight increases your risk of certain health conditions. These include heart disease, high blood pressure, type 2 diabetes, and certain types of cancer. • Protect your skin. Do not sunbathe or use tanning beds. Use sunscreen with a SPF 15 or higher. Apply sunscreen at least 15 minutes before you go outside. Reapply sunscreen every 2 hours. Wear protective clothing, hats, and sunglasses when you are outside. • Drive safely. Always wear your seatbelt. Make sure everyone in your car wears a seatbelt.  A seatbelt can save your life if you are in an accident. Do not use your cell phone when you are driving. This could distract you and cause an accident. Pull over if you need to make a call or send a text message. • Practice safe sex. Use latex condoms if are sexually active and have more than one partner. Your healthcare provider may recommend screening tests for sexually transmitted infections (STIs). • Wear helmets, lifejackets, and protective gear. Always wear a helmet when you ride a bike or motorcycle, go skiing, or play sports that could cause a head injury. Wear protective equipment when you play sports. Wear a lifejacket when you are on a boat or doing water sports. © Copyright [facility] 2022 Information is for End User's use only and may not be sold, redistributed or otherwise used for commercial purposes. The above information is an  only. It is not intended as medical advice for individual conditions or treatments. Talk to your doctor, nurse or pharmacist before following any medical regimen to see if it is safe and effective for you.

## 2023-08-30 NOTE — PROGRESS NOTES
ADULT ANNUAL [place]    NAME: Marilu Little  AGE: 45 y.o. SEX: male  : 1970     DATE: 2023     Assessment and Plan:     Problem List Items Addressed This Visit        Cardiovascular and Mediastinum    Deep vein thrombosis (DVT) ([address]) (Chronic)       Other    Annual physical exam - Primary   Other Visit Diagnoses     CKD (chronic kidney disease) stage 2, GFR 60-89 ml/min        Snoring        Relevant Orders    Diagnostic Sleep Study          Patient is here for annual visit. He is a new patient to me. Left  saphenous superficial thrombophlebitis and patient with history of recurrent DVT and on Xarelto therapy. Last seen by hematology 6/3/2021 needs to follow up with hematology patient has upcoming appointment with hematology in October. workup done recently for his hypercoagulable state on 2021 which showed normal CBC and CMP. Inflammatory markers were not elevated. LDH normal . His anticardiolipin and beta 2 glycoprotein antibodies came back in the normal range ruling out antiphospholipid syndrome. Negative for factor 5 Leiden and prothrombin gene mutation. Colonoscopy 2022        Immunizations and preventive care screenings were discussed with patient today. Appropriate education was printed on patient's after visit summary. Discussed risks and benefits of prostate cancer screening. We discussed the controversial history of PSA screening for prostate cancer in the [facility] as well as the risk of over detection and over treatment of prostate cancer by way of PSA screening. The patient understands that PSA blood testing is an imperfect way to screen for prostate cancer and that elevated PSA levels in the blood may also be caused by infection, inflammation, prostatic trauma or manipulation, urological procedures, or by benign prostatic enlargement.     The role of the digital rectal examination in prostate cancer screening was also discussed and I discussed with him that there is large interobserver variability in the findings of digital rectal examination. Counseling:  · Alcohol/drug use: discussed moderation in alcohol intake, the recommendations for healthy alcohol use, and avoidance of illicit drug use. BMI Counseling: Body mass index is 31.88 kg/m². The BMI is above normal. Nutrition recommendations include decreasing portion sizes and decreasing fast food intake. Exercise recommendations include moderate physical activity 150 minutes/week. Rationale for BMI follow-up plan is due to patient being overweight or obese. Depression Screening and Follow-up Plan: Patient was screened for depression during today's encounter. They screened negative with a PHQ-2 score of 0. Return in about 6 months (around 2/29/2024) for Next scheduled follow up. Chief Complaint:     Chief Complaint   Patient presents with   • Physical Exam     Annual. No concerns      History of Present Illness:     Adult Annual Physical   Patient here for a comprehensive physical exam. The patient reports problems - Snoring. Patient reports that his wife noticed that he is gasping as well. Patient reports he wakes up with dry mouth. Then during the day he feels tired and takes naps. .    Diet and Physical Activity  · Diet/Nutrition: well balanced diet. · Exercise: 3-4 times a week on average. Depression Screening  PHQ-2/9 Depression Screening    Little interest or pleasure in doing things: 0 - not at all  Feeling down, depressed, or hopeless: 0 - not at all  PHQ-2 Score: 0  PHQ-2 Interpretation: Negative depression screen       General Health  · Sleep: gets 1-3 hours of sleep on average. · Hearing: normal - bilateral.  · Vision: most recent eye exam <1 year ago and wears glasses. · Dental: regular dental visits and brushes teeth twice daily.         Health  · Symptoms include: none     Review of Systems:     Review of Systems   Constitutional: Negative for chills, fever and unexpected weight change. HENT: Negative for ear pain and sore throat. Eyes: Negative for pain and visual disturbance. Respiratory: Negative for cough, shortness of breath and wheezing. Cardiovascular: Negative for chest pain, palpitations and leg swelling. Gastrointestinal: Negative for abdominal distention, abdominal pain, anal bleeding, blood in stool, constipation and vomiting. Genitourinary: Negative for dysuria and hematuria. Musculoskeletal: Negative for arthralgias and back pain. Skin: Negative for color change, pallor and rash. Neurological: Negative for seizures and syncope. All other systems reviewed and are negative.      Past Medical History:     Past Medical History:   Diagnosis Date   • Allergic    • BPH with obstruction/lower urinary tract symptoms    • COVID-19 01/2021   • Deep vein thrombosis (DVT) (HCC)    • Erectile dysfunction due to arterial insufficiency    • Hypertension    • No known health problems     history of denial of any significant medical , no pertinent past medical history per allscripts   • Testicular hypogonadism    • Varicocele       Past Surgical History:     Past Surgical History:   Procedure Laterality Date   • EYE SURGERY     • INGUINAL HERNIA REPAIR     • UMBILICAL HERNIA REPAIR      per allscripts   • VASECTOMY     • VEIN LIGATION AND STRIPPING        Family History:     Family History   Problem Relation Age of Onset   • Heart disease Mother    • Heart attack Mother    • Breast cancer Mother    • Hypertension Mother    • Cerebral aneurysm Mother    • Prostate cancer Father    • Bone cancer Father    • Diabetes type II Father    • Testicular cancer Cousin    • Coronary artery disease Maternal Grandmother    • Colon cancer Paternal Grandmother    • No Known Problems Son    • No Known Problems Son       Social History:     Social History     Socioeconomic History   • Marital status: /Civil Union     Spouse name: None   • Number of children: 2   • Years of education: None   • Highest education level: None   Occupational History   • Occupation:    Tobacco Use   • Smoking status: Never   • Smokeless tobacco: Never   Vaping Use   • Vaping Use: Never used   Substance and Sexual Activity   • Alcohol use: Yes     Comment: Occasionally   • Drug use: No   • Sexual activity: Yes     Partners: Female   Other Topics Concern   • None   Social History Narrative    Daily caffeine use- 6 cups of coffee     Social Determinants of Health     Financial Resource Strain: Not on file   Food Insecurity: Not on file   Transportation Needs: Not on file   Physical Activity: Not on file   Stress: Not on file   Social Connections: Not on file   Intimate Partner Violence: Not on file   Housing Stability: Not on file      Current Medications:     Current Outpatient Medications   Medication Sig Dispense Refill   • fluticasone (FLONASE) 50 mcg/act nasal spray 2 sprays into each nostril daily (Patient taking differently: 2 sprays into each nostril if needed) 1 g 0   • lisinopril (ZESTRIL) 10 mg tablet Take 1 tablet by mouth once daily 90 tablet 0   • loratadine (CLARITIN) 10 mg tablet Take 1 tablet (10 mg total) by mouth daily (Patient taking differently: Take 10 mg by mouth if needed) 30 tablet 0   • Omega-3 1000 MG CAPS Take by mouth in the morning     • Red Yeast Rice 600 MG TABS Take by mouth in the morning     • sildenafil (VIAGRA) 25 MG tablet 1 to 2 tablets p.o. daily as needed 30 tablet 1   • Xarelto 10 MG tablet Take 1 tablet by mouth once daily 90 tablet 1     No current facility-administered medications for this visit. Allergies:      Allergies   Allergen Reactions   • Contrast Dye [Iodinated Contrast Media] Throat Swelling   • Penicillins    • Iodine - Food Allergy Other (See Comments)     Mild allergic like reaction to iohexol (self limited sensation of throat tightening, not requiring treatment). Physical Exam:     /80 (BP Location: Left arm, Patient Position: Sitting)   Pulse 77   Temp (!) 96.6 °F (35.9 °C) (Tympanic)   Ht 6' 1" (1.854 m)   Wt 110 kg (241 lb 9.6 oz)   SpO2 98%   BMI 31.88 kg/m²     Physical Exam  Vitals and nursing note reviewed. Constitutional:       General: He is not in acute distress. Appearance: He is well-developed. He is obese. He is not toxic-appearing. HENT:      Head: Normocephalic and atraumatic. Eyes:      Extraocular Movements: Extraocular movements intact. Conjunctiva/sclera: Conjunctivae normal.   Cardiovascular:      Rate and Rhythm: Normal rate and regular rhythm. Pulses: Normal pulses. Heart sounds: Normal heart sounds. Pulmonary:      Effort: Pulmonary effort is normal. No respiratory distress. Breath sounds: Normal breath sounds. Musculoskeletal:      Cervical back: Neck supple. Right lower leg: No edema. Left lower leg: No edema. Skin:     General: Skin is warm and dry. Capillary Refill: Capillary refill takes less than 2 seconds. Neurological:      General: No focal deficit present. Mental Status: He is alert and oriented to person, place, and time. Psychiatric:         Mood and Affect: Mood normal.         Thought Content:  Thought content normal.         Judgment: Judgment normal.          Solo Packer MD  [address] [facility]

## 2023-08-31 ENCOUNTER — OFFICE VISIT (OUTPATIENT)
Dept: FAMILY MEDICINE CLINIC | Facility: CLINIC | Age: 53
End: 2023-08-31

## 2023-08-31 VITALS
DIASTOLIC BLOOD PRESSURE: 80 MMHG | HEIGHT: 73 IN | HEART RATE: 77 BPM | BODY MASS INDEX: 32.02 KG/M2 | OXYGEN SATURATION: 98 % | TEMPERATURE: 96.6 F | SYSTOLIC BLOOD PRESSURE: 128 MMHG | WEIGHT: 241.6 LBS

## 2023-08-31 DIAGNOSIS — Z00.00 ANNUAL PHYSICAL EXAM: Primary | ICD-10-CM

## 2023-08-31 DIAGNOSIS — I82.5Y3 CHRONIC DEEP VEIN THROMBOSIS (DVT) OF PROXIMAL VEIN OF BOTH LOWER EXTREMITIES (HCC): Chronic | ICD-10-CM

## 2023-08-31 DIAGNOSIS — N18.2 CKD (CHRONIC KIDNEY DISEASE) STAGE 2, GFR 60-89 ML/MIN: ICD-10-CM

## 2023-08-31 DIAGNOSIS — R06.83 SNORING: ICD-10-CM

## 2023-09-01 ENCOUNTER — TELEPHONE (OUTPATIENT)
Dept: HEMATOLOGY ONCOLOGY | Facility: CLINIC | Age: 53
End: 2023-09-01

## 2023-09-01 NOTE — TELEPHONE ENCOUNTER
I called [facility] regarding an appointment that they have scheduled with ULISES Duval scheduled on 10/11/23     I left a voicemail explaining to patient that this appointment will need to be rescheduled due to a change in the providers schedule. Patient was advised to call [facility] to reschedule. A [facility] message (if applicable) has been sent to patient relaying the above information and advising patient to call [facility] and reschedule their appointment.

## 2023-09-08 ENCOUNTER — TELEPHONE (OUTPATIENT)
Dept: HEMATOLOGY ONCOLOGY | Facility: CLINIC | Age: 53
End: 2023-09-08

## 2023-09-08 NOTE — TELEPHONE ENCOUNTER
Appointment Change  Cancel, Reschedule, Change to Virtual      Who are you speaking with? Patient   If it is not the patient, is the caller listed on the communication consent form? N/A   Which provider is the appointment scheduled with? ULISES Dotson   When was the original appointment scheduled? Please list date and time 10/11/23 9:30am   At which location is the appointment scheduled to take place? [facility]   Was the appointment rescheduled? Was the appointment changed from an in person visit to a virtual visit? If so, please list the details of the change. 11/1/23 at 8:00a,   What is the reason for the appointment change? Change in provider's schedule       Was STAR transport scheduled? No   Does STAR transport need to be scheduled for the new visit (if applicable) No   Does the patient need an infusion appointment rescheduled? No   Does the patient have an upcoming infusion appointment scheduled? If so, when? No   Is the patient undergoing chemotherapy? No   For appointments cancelled with less than 24 hours:  Was the no-show policy reviewed?  No

## 2023-09-13 ENCOUNTER — TELEPHONE (OUTPATIENT)
Age: 53
End: 2023-09-13

## 2023-09-13 ENCOUNTER — OFFICE VISIT (OUTPATIENT)
Dept: OBGYN CLINIC | Facility: CLINIC | Age: 53
End: 2023-09-13
Payer: OTHER MISCELLANEOUS

## 2023-09-13 VITALS
HEIGHT: 73 IN | DIASTOLIC BLOOD PRESSURE: 88 MMHG | SYSTOLIC BLOOD PRESSURE: 104 MMHG | BODY MASS INDEX: 31.94 KG/M2 | WEIGHT: 241 LBS

## 2023-09-13 DIAGNOSIS — S80.01XA CONTUSION OF RIGHT KNEE, INITIAL ENCOUNTER: Primary | ICD-10-CM

## 2023-09-13 DIAGNOSIS — M25.561 RIGHT KNEE PAIN, UNSPECIFIED CHRONICITY: ICD-10-CM

## 2023-09-13 DIAGNOSIS — S76.211A STRAIN OF ADDUCTOR MAGNUS MUSCLE OF RIGHT LOWER EXTREMITY, INITIAL ENCOUNTER: ICD-10-CM

## 2023-09-13 PROCEDURE — 99213 OFFICE O/P EST LOW 20 MIN: CPT | Performed by: FAMILY MEDICINE

## 2023-09-13 NOTE — PROGRESS NOTES
Assessment:     1. Contusion of right knee, initial encounter        2. Right knee pain, unspecified chronicity  CANCELED: XR knee 4+ vw right injury      3. Strain of adductor armen muscle of right lower extremity, initial encounter          No orders of the defined types were placed in this encounter. Impression:   Knee pain likely secondary to Contusion after fall and adductor strain . DOI: 09/09/2023  FUI: 4 days    Conservative Management   We discussed different treatment options:  Reviewed documentation brought in by patient from urgent care on 09/ 09/2023  • Ice or Heat Therapy as needed 1-2 times daily for 10-20 minutes. As tolerated. • Over the counter Tylenol and/or NSAIDs  as needed based off your Past Medical Hx. Please follow product label for dosing and maximum limits. • Trial of over the counter Topical Analgesics such as Lidocaine cream or Voltaren Gel, as tolerated. If skin becomes irritated, discontinue use. • Please range joint through gentle range of motion as tolerated. • Initiate Home Exercise Program for Stretching and Strengthening affected area. Knee exercises provided today. • Work note provided. Limitations provided. Imaging   • Reviewed prior xrays obtain in Urgent or Emergency Department. These were reviewed in office with patient today. • 09/09/2023: Impressions no acute osseous abnormality. Unable to view radiological images. Procedure  • Not appropriate at this time. Shared decision making, patient agreeable to plan. Return for Follow up after 2 wks. HPI:   Marilu Little is a 45 y.o. male  who presents for evaluation of   Chief Complaint   Patient presents with   • Right Knee - Pain       Occupation: Mixed job of sitting/standing, no heavy lifting or ladder climb, Injury Related: Yes, Date of Injury: 09/09/2023, Workmen's Compensation. Onset/Mechanism: Going upstairs into work. Mechanical fall. Falling onto right knee.   Patient was seen the same day it happened in urgent care. On 09/09/2023. Treatment plan consisted of x-rays. Diagnosed correlating with bone contusion. .  Location: Medial side of knee and anterior knee. Severity: Current severity: 0/10. Max severity: 2-3/10. Pain described as: Pain  Radiation: Denies pain radiating up into hip or down into ankle. Provocative: Steps, prolonged sitting, walking  Associated symptoms: Swelling, abrasion, instability. Denies any hx of fracture of affected limb. , Denies any surgical history of affected limb.  , Denies any new or changes to previous numbness/ tingling of affected limb., Denies any new or changes to previous weakness down into affected extremity. Summary of treatment to-date:   Rest  Ice therapy  No over-the-counter Tylenol  Unable to take NSAIDs due to history on anticoagulation.     Following History Reviewed and Updated     Past Medical History:   Diagnosis Date   • Allergic    • BPH with obstruction/lower urinary tract symptoms    • COVID-19 01/2021   • Deep vein thrombosis (DVT) (HCC)    • Erectile dysfunction due to arterial insufficiency    • Hypertension    • No known health problems     history of denial of any significant medical , no pertinent past medical history per allscripts   • Testicular hypogonadism    • Varicocele      Past Surgical History:   Procedure Laterality Date   • EYE SURGERY     • INGUINAL HERNIA REPAIR     • UMBILICAL HERNIA REPAIR      per allscripts   • VASECTOMY     • VEIN LIGATION AND STRIPPING       Family History   Problem Relation Age of Onset   • Heart disease Mother    • Heart attack Mother    • Breast cancer Mother    • Hypertension Mother    • Cerebral aneurysm Mother    • Prostate cancer Father    • Bone cancer Father    • Diabetes type II Father    • Testicular cancer Cousin    • Coronary artery disease Maternal Grandmother    • Colon cancer Paternal Grandmother    • No Known Problems Son    • No Known Problems Son        Social History     Substance and Sexual Activity   Alcohol Use Yes    Comment: Occasionally     Social History     Substance and Sexual Activity   Drug Use No     Social History     Tobacco Use   Smoking Status Never   Smokeless Tobacco Never       Social Determinants of Health     Tobacco Use: Low Risk  (9/13/2023)    Patient History    • Smoking Tobacco Use: Never    • Smokeless Tobacco Use: Never    • Passive Exposure: Not on file   Alcohol Use: Unknown (3/12/2021)    AUDIT-C    • Frequency of Alcohol Consumption: Monthly or less    • Average Number of Drinks: Not on file    • Frequency of Binge Drinking: Not on file   Financial Resource Strain: Not on file   Food Insecurity: Not on file   Transportation Needs: Not on file   Physical Activity: Not on file   Stress: Not on file   Social Connections: Not on file   Intimate Partner Violence: Not on file   Depression: Not at risk (8/31/2023)    PHQ-2    • PHQ-2 Score: 0   Housing Stability: Not on file   Utilities: Not on file        Allergies   Allergen Reactions   • Contrast Dye [Iodinated Contrast Media] Throat Swelling   • Penicillins    • Iodine - Food Allergy Other (See Comments)     Mild allergic like reaction to iohexol (self limited sensation of throat tightening, not requiring treatment). Review of Systems      Review of Systems   Review of Systems   Constitutional: Negative for chills and fever. HENT: Negative for drooling and sneezing. Eyes: Negative for redness. Respiratory: Negative for cough and wheezing. Gastrointestinal: Negative for vomiting. Psychiatric/Behavioral: Negative for behavioral problems. The patient is not nervous/anxious. All other systems negative. Physical Exam   Physical Exam    Vitals and nursing note reviewed. Constitutional:   Appearance. Normal Appearance. /88   Ht 6' 1" (1.854 m)   Wt 109 kg (241 lb)   BMI 31.80 kg/m²     Body mass index is 31.8 kg/m². HENT:  Head: Atraumatic.   Nose: Nose normal  Eyes: Conjunctiva/sclera: Conjunctivae normal.  Cardiovascular:   Rate and Rhythm: Bilateral equal distal pulses  Pulmonary:   Effort: Pulmonary effort is normal  Skin:   General: Skin is warm and dry. Neurological:   General: No focal deficit present. Mental Status: Alert and oriented to person, place, and time.    Psychiatric:   Mood and Affect: mood normal.  Behavior: Behavior normal     Musculoskeletal Exam     Ortho Exam     INSPECTION  - Erythema: no  - Bruising: Medial distal femur around the adductors  - Effusion: Mild  - Muscle atrophy: no    PALPATION:  - Increased warmth: no  - Crepitus: no  -Palpable depression:  -Retracted muscle:    TENDERNESS:  - Quadriceps tendon: no  - Patella: Mild  -Adductors: Tenderness to palpation reproducing chief complaint  - Patellar tendon: no  - Medial joint line: no  - Lateral joint line: no  - Tibial tubercle: no  - Pes anserine bursa: no  - Posterior knee: no  -Gerdy's tubercle: No  -Biceps femoris: No  -Semimembranosus/semitendinosus: No  -Popliteus tendon: No    Bilateral Range of Motion:  - Active/passive flexion: Limited secondary to pain along the medial abductor side (normal 135 degrees) flexion around 90 degrees  - Active/passive extension: Full  (normal 0 degrees)    Bilateral strength:  -Seated hip Flexion: 5/5  -Seated hip abduction: 5/5  -Seated hip adduction 4-/5 with discomfort reproducing chief complaint  -Seated knee Flexion: 5/5 with discomfort  -Seated knee Extension: 5/5  -Seated great Toe Extension: 5/5  -Seated ankle Dorsiflexion: 5/5  -Seated ankle Plantarflexion: 5/5    PATELLA:  - Patellofemoral tracking (observing the patella for smooth motion while the patient contracts the quadriceps muscle): normal and symmetrical    - Patellar Displacement: normal and symmetrical  - Patellar Grind Unger's: Discomfort    SPECIAL TEST:  - Anterior cruciate ligament (ACL): Lachman's negative  - Posterior cruciate ligament (PCL): Posterior drawer's negative  - Medial Collateral Ligament (MCL): Valgus laxity negative  - Lateral Collateral Ligament (LCL): Varus laxity negative  - Medial meniscus: Medial [facility]'s:negative for laxity and negative for discomfort  - Lateral meniscus: Lateral Belkis's: negative    -Apley's  -Thessaly's:   -Balance home test:    NEUROVASCULAR:  - Sensation to light touch equal bilaterally   - Posterior Tibial Artery pulse: bilaterally intact                 Procedures       Portions of the record may have been created with voice recognition software. Occasional wrong word or "sound alike" substitutions may have occurred due to the inherent limitations of voice recognition software. Please review the chart carefully and recognize, using context, where substitutions/typographical errors may have occurred.

## 2023-09-13 NOTE — PATIENT INSTRUCTIONS
P. R.I.C.E. Treatment   WHAT YOU NEED TO KNOW:   What is P.R.I.C.E. treatment? P.R.I.C.E. treatment is a 5-step process used to decrease swelling and pain caused by an injury. P.R.I.C.E. stands for protect, rest, ice, compress, and elevate. Start P.R.I.C.E. within 24 to 48 hours of an injury. How do I use P.R.I.C.E. treatment? Protect  your injury from more damage. Support the injured area with a brace or splint. Your healthcare provider will tell you how long to use the brace or splint. Rest  your injured area as directed. You may need to stop using, or keep weight off, the injury for 48 hours or longer. Your healthcare provider may recommend crutches or another device. Return to your usual activities as directed. Apply ice  on your injured area for 15 to 20 minutes every 4 hours or as directed. Use an ice pack, or put crushed ice in a plastic bag. Cover the bag with a towel before you apply it to your skin. Ice helps prevent tissue damage and decreases swelling and pain. Compress  (keep pressure on) the injured area. Compression will help decrease swelling and support the injured area. Use an elastic bandage, air stirrup, splint, or sling as directed. If you use an elastic bandage, make sure the bandage is not too tight. You should be able to slip 2 fingers between the bandage and your skin. Elevate  the injured area above the level of your heart as often as you can. This will help decrease swelling and pain. Prop the injured area on pillows or blankets to keep it elevated comfortably. When should I seek immediate care? Your pain is severe. You have severe swelling or deformity. You have numbness in the injured area. When should I call my doctor? Your pain and swelling do not go away after a few days. You have questions or concerns about your condition or care. CARE AGREEMENT:   You have the right to help plan your care.  Learn about your health condition and how it may be treated. Discuss treatment options with your healthcare providers to decide what care you want to receive. You always have the right to refuse treatment. The above information is an  only. It is not intended as medical advice for individual conditions or treatments. Talk to your doctor, nurse or pharmacist before following any medical regimen to see if it is safe and effective for you. © Copyright Thana Givens 2022 Information is for End User's use only and may not be sold, redistributed or otherwise used for commercial purposes.

## 2023-09-13 NOTE — LETTER
September 13, 2023     Patient: Nora Pendleton  YOB: 1970  Date of Visit: 9/13/2023      To Whom it May Concern:    Ykuijarvis Zhu is under my professional care. Aziza Callahan was seen in my office on 9/13/2023. Aziza Callahan may return to work on 09/16/2023 as tolerated. No heavy lifting, deep squatting or ladder climbing. We will re-evaluate in 2 weeks. If you have any questions or concerns, please don't hesitate to call.          Sincerely,          Rita Bo DO        CC: No Recipients

## 2023-09-13 NOTE — TELEPHONE ENCOUNTER
Caller: Mikey Lino    Doctor: Shanika Ortiz    Reason for call:    Please see the following W/ C information  W/C Claim # [id number], Coy Lino, [address] [facility], [address] [place] [address], Claim Adj,  Sissy MakBrenna Phone [phone]    Call back#: n/a

## 2023-09-18 ENCOUNTER — TELEPHONE (OUTPATIENT)
Age: 53
End: 2023-09-18

## 2023-09-18 NOTE — TELEPHONE ENCOUNTER
Caller: Patient    Doctor: Lionel Angel    Reason for call: Pt went back to work on Friday as discussed with the Dr, but was unable to make it the entire shift because his knee began swelling and he had increased pain. Pt does not feel he can work at this time due to lack of improvement. Please advise.     Call back#: [phone]

## 2023-09-19 ENCOUNTER — TELEPHONE (OUTPATIENT)
Dept: OBGYN CLINIC | Facility: CLINIC | Age: 53
End: 2023-09-19

## 2023-09-20 NOTE — TELEPHONE ENCOUNTER
Caller: Self    Doctor: Anupam    Reason for call: Patient wanted to let provider know that the swelling has not gone away and worsens when ambulating. The pain is a bit better but swelling is always there. Should he be concerned?     Call back#: [phone]

## 2023-09-21 NOTE — TELEPHONE ENCOUNTER
Please call patient to ask if he would like a sooner appointment for re-evaluation. Swelling worsening with standing is acceptable. May trial compression stocking or knee ace wrap. Any swelling with warmth or redness of the skin would not be acceptable.

## 2023-09-28 ENCOUNTER — OFFICE VISIT (OUTPATIENT)
Dept: OBGYN CLINIC | Facility: CLINIC | Age: 53
End: 2023-09-28
Payer: OTHER MISCELLANEOUS

## 2023-09-28 VITALS
HEIGHT: 73 IN | BODY MASS INDEX: 31.94 KG/M2 | WEIGHT: 241 LBS | DIASTOLIC BLOOD PRESSURE: 80 MMHG | SYSTOLIC BLOOD PRESSURE: 105 MMHG

## 2023-09-28 DIAGNOSIS — S80.01XD CONTUSION OF RIGHT KNEE, SUBSEQUENT ENCOUNTER: ICD-10-CM

## 2023-09-28 DIAGNOSIS — S76.211D STRAIN OF ADDUCTOR MAGNUS MUSCLE OF RIGHT LOWER EXTREMITY, SUBSEQUENT ENCOUNTER: ICD-10-CM

## 2023-09-28 DIAGNOSIS — M25.561 RIGHT KNEE PAIN, UNSPECIFIED CHRONICITY: Primary | ICD-10-CM

## 2023-09-28 DIAGNOSIS — M79.604 PAIN OF RIGHT LOWER EXTREMITY: ICD-10-CM

## 2023-09-28 PROCEDURE — 99214 OFFICE O/P EST MOD 30 MIN: CPT | Performed by: FAMILY MEDICINE

## 2023-09-28 NOTE — LETTER
September 28, 2023     Patient: Kamar Shin  YOB: 1970  Date of Visit: 9/28/2023      To Whom it May Concern:    Shi Fu is under my professional care. Robert Narvaez was seen in my office on 9/28/2023. Robert Narvaez may return to work on 09/28/2023 without any limitations to the right lower extremity . If you have any questions or concerns, please don't hesitate to call.          Sincerely,          Miguel Bo DO        CC: No Recipients

## 2023-09-28 NOTE — PROGRESS NOTES
Assessment:     1. Right knee pain, unspecified chronicity        2. Contusion of right knee, subsequent encounter        3. Strain of adductor armen muscle of right lower extremity, subsequent encounter        4. Pain of right lower extremity  XR tibia fibula 2 vw left        Orders Placed This Encounter   Procedures   • XR tibia fibula 2 vw left        Impression:   Knee pain likely secondary to Contusion after fall and adductor strain . Anterior shin pain likely secondary to contusion after fall  DOI: 09/09/2023  FUI: 2 weeks and 5 days      Pertinent past medical history:  History of DVT on the right and left leg  Patient is on Xarelto 10 mg    Conservative Management   We discussed different treatment options:  Reviewed documentation brought in by patient from urgent care on 09/ 09/2023  • Ice or Heat Therapy as needed 1-2 times daily for 10-20 minutes. As tolerated. • Over the counter Tylenol and/or NSAIDs  as needed based off your Past Medical Hx. Please follow product label for dosing and maximum limits. • Trial of over the counter Topical Analgesics such as Lidocaine cream or Voltaren Gel, as tolerated. If skin becomes irritated, discontinue use. • Please range joint through gentle range of motion as tolerated. • May continue with Home Exercise Program for Stretching and Strengthening affected area. Ankle exercises provided today. • Work note provided. No limitations provided. • Patient's discomfort almost fully resolved. Did place a prescription for formal physical therapy if patient feels like he is still lacking in improvement.        Imaging   • Reviewed prior xrays obtain in Urgent or Emergency Department. These were reviewed in office with patient today. • 09/09/2023: Right knee x-ray: Impressions no acute osseous abnormality. Unable to view radiological images.   • Today's imaging:  • 09/28/2023: Tib-fib right x-rays: No acute osseous abnormality     Procedure  • Not appropriate at this time.      Shared decision making, patient agreeable to plan. Return for Follow up as needed or if symptoms do NOT improve. HPI:   Joshua Hyman is a 52 y.o. male  who presents for evaluation of   Chief Complaint   Patient presents with   • Right Knee - Follow-up, Swelling     Today's exam:  Denies any new trauma. Severity: Current severity: 0/10. Max severity: Lower than a 2-3/10. Pain described as: Minor pain over top of the kneecap, muscle discomfort  Provocative: minor with knee flexion /extension   Associated symptoms: Sensitive to touch, swelling  Feels about 90-95%    Previous visit 09/13/2023  Occupation: Mixed job of sitting/standing, no heavy lifting or ladder climb, Injury Related: Yes, Date of Injury: 09/09/2023, Workmen's Compensation.      Onset/Mechanism: Going upstairs into work. Mechanical fall. Falling onto right knee. Patient was seen the same day it happened in urgent care. On 09/09/2023. Treatment plan consisted of x-rays. Diagnosed correlating with bone contusion. .  Location: Medial side of knee and anterior knee. Severity: Current severity: 0/10. Max severity: 2-3/10. Pain described as: Pain  Radiation: Denies pain radiating up into hip or down into ankle. Provocative: Steps, prolonged sitting, walking  Associated symptoms: Swelling, abrasion, instability.        Denies any hx of fracture of affected limb. , Denies any surgical history of affected limb.  , Denies any new or changes to previous numbness/ tingling of affected limb., Denies any new or changes to previous weakness down into affected extremity.      Summary of treatment to-date:   Rest  Ice therapy  No over-the-counter Tylenol  Unable to take NSAIDs due to history on anticoagulation.     Following History Reviewed and Updated     Past Medical History:   Diagnosis Date   • Allergic    • BPH with obstruction/lower urinary tract symptoms    • COVID-19 01/2021   • Deep vein thrombosis (DVT) [facility])    • Erectile dysfunction due to arterial insufficiency    • Hypertension    • No known health problems     history of denial of any significant medical , no pertinent past medical history per allscripts   • Testicular hypogonadism    • Varicocele      Past Surgical History:   Procedure Laterality Date   • EYE SURGERY     • INGUINAL HERNIA REPAIR     • UMBILICAL HERNIA REPAIR      per allscripts   • VASECTOMY     • VEIN LIGATION AND STRIPPING       Family History   Problem Relation Age of Onset   • Heart disease Mother    • Heart attack Mother    • Breast cancer Mother    • Hypertension Mother    • Cerebral aneurysm Mother    • Prostate cancer Father    • Bone cancer Father    • Diabetes type II Father    • Testicular cancer Cousin    • Coronary artery disease Maternal Grandmother    • Colon cancer Paternal Grandmother    • No Known Problems Son    • No Known Problems Son        Social History     Substance and Sexual Activity   Alcohol Use Yes    Comment: Occasionally     Social History     Substance and Sexual Activity   Drug Use No     Social History     Tobacco Use   Smoking Status Never   Smokeless Tobacco Never       Social Determinants of Health     Tobacco Use: Low Risk  (9/28/2023)    Patient History    • Smoking Tobacco Use: Never    • Smokeless Tobacco Use: Never    • Passive Exposure: Not on file   Alcohol Use: Unknown (3/12/2021)    AUDIT-C    • Frequency of Alcohol Consumption: Monthly or less    • Average Number of Drinks: Not on file    • Frequency of Binge Drinking: Not on file   Financial Resource Strain: Not on file   Food Insecurity: Not on file   Transportation Needs: Not on file   Physical Activity: Not on file   Stress: Not on file   Social Connections: Not on file   Intimate Partner Violence: Not on file   Depression: Not at risk (8/31/2023)    PHQ-2    • PHQ-2 Score: 0   Housing Stability: Not on file   Utilities: Not on file        Allergies   Allergen Reactions   • Contrast Dye [Iodinated Contrast Media] Throat Swelling   • Penicillins    • Iodine - Food Allergy Other (See Comments)     Mild allergic like reaction to iohexol (self limited sensation of throat tightening, not requiring treatment). Review of Systems      Review of Systems   Review of Systems   Constitutional: Negative for chills and fever. HENT: Negative for drooling and sneezing. Eyes: Negative for redness. Respiratory: Negative for cough and wheezing. Gastrointestinal: Negative for vomiting. Psychiatric/Behavioral: Negative for behavioral problems. The patient is not nervous/anxious. All other systems negative. Physical Exam   Physical Exam    Vitals and nursing note reviewed. Constitutional:   Appearance. Normal Appearance. /80   Ht 6' 1" (1.854 m)   Wt 109 kg (241 lb)   BMI 31.80 kg/m²     Body mass index is 31.8 kg/m². HENT:  Head: Atraumatic. Nose: Nose normal  Eyes: Conjunctiva/sclera: Conjunctivae normal.  Cardiovascular:   Rate and Rhythm: Bilateral equal distal pulses  Pulmonary:   Effort: Pulmonary effort is normal  Skin:   General: Skin is warm and dry. Neurological:   General: No focal deficit present. Mental Status: Alert and oriented to person, place, and time.    Psychiatric:   Mood and Affect: mood normal.  Behavior: Behavior normal     Musculoskeletal Exam     Ortho Exam   Right knee/right lower leg     INSPECTION  - Erythema: no  - Bruising: Medial distal femur around the adductors, resolving, bruising also noted over anterior distal tibia  - Effusion: Mild  - Muscle atrophy: no  -Patient with diffuse bilateral swelling right greater than left, pitting edema +1     PALPATION:  - Increased warmth: no  - Crepitus: no  -Palpable depression:  -Retracted muscle:  -Compartments bilaterally soft      TENDERNESS:  - Quadriceps tendon: no  - Patella:  No  -Adductors: Tenderness to palpation reproducing chief complaint  - Patellar tendon: no  - Medial joint line: no  - Lateral joint line: no  - Tibial tubercle: no  - Pes anserine bursa: no  - Posterior knee: no  -Gerdy's tubercle: No  -Biceps femoris: No  -Semimembranosus/semitendinosus: No  -Popliteus tendon: No  -Anterior shin: Tenderness to light palpation      Bilateral Range of Motion:  - Active/passive flexion:  Intact (normal 135 degrees)   - Active/passive extension: Full  (normal 0 degrees)     Bilateral strength:  -Seated hip Flexion: 5/5  -Seated hip abduction: 5/5  -Seated hip adduction  4+/5 with discomfort reproducing chief complaint  -Seated knee Flexion: 5/5 with discomfort  -Seated knee Extension: 5/5  -Seated great Toe Extension: 5/5  -Seated ankle Dorsiflexion: 5/5  -Seated ankle Plantarflexion: 5/5     PATELLA:  - Patellofemoral tracking (observing the patella for smooth motion while the patient contracts the quadriceps muscle): normal and symmetrical    - Patellar Displacement: normal and symmetrical     SPECIAL TEST:  - Anterior cruciate ligament (ACL): Lachman's negative  - Posterior cruciate ligament (PCL): Posterior drawer's negative  - Medial Collateral Ligament (MCL): Valgus laxity negative  - Lateral Collateral Ligament (LCL): Varus laxity negative  - Medial meniscus: Medial [facility]'s:negative for laxity and negative for discomfort  - Lateral meniscus: Lateral Belkis's: negative     -Apley's  -Thessaly's:   -Balance home test:    -Homans test: Negative     NEUROVASCULAR:  - Sensation to light touch equal bilaterally   - Posterior Tibial Artery pulse: bilaterally intact         Procedures       Portions of the record may have been created with voice recognition software. Occasional wrong word or "sound alike" substitutions may have occurred due to the inherent limitations of voice recognition software. Please review the chart carefully and recognize, using context, where substitutions/typographical errors may have occurred.

## 2023-11-01 ENCOUNTER — OFFICE VISIT (OUTPATIENT)
Dept: HEMATOLOGY ONCOLOGY | Facility: CLINIC | Age: 53
End: 2023-11-01
Payer: COMMERCIAL

## 2023-11-01 VITALS
HEIGHT: 73 IN | OXYGEN SATURATION: 99 % | RESPIRATION RATE: 18 BRPM | SYSTOLIC BLOOD PRESSURE: 120 MMHG | WEIGHT: 247 LBS | DIASTOLIC BLOOD PRESSURE: 80 MMHG | HEART RATE: 67 BPM | TEMPERATURE: 97.2 F | BODY MASS INDEX: 32.74 KG/M2

## 2023-11-01 DIAGNOSIS — I80.02 THROMBOPHLEBITIS OF SUPERFICIAL VEINS OF LEFT LOWER EXTREMITY: ICD-10-CM

## 2023-11-01 DIAGNOSIS — I82.5Y3 CHRONIC DEEP VEIN THROMBOSIS (DVT) OF PROXIMAL VEIN OF BOTH LOWER EXTREMITIES (HCC): Primary | Chronic | ICD-10-CM

## 2023-11-01 PROCEDURE — 99214 OFFICE O/P EST MOD 30 MIN: CPT | Performed by: NURSE PRACTITIONER

## 2023-11-01 NOTE — PROGRESS NOTES
Hematology/Oncology Outpatient Follow-up  Alida Okeefe 52 y.o. male 1970 [id number]    Date:  11/1/2023      Assessment and Plan:  1. Chronic deep vein thrombosis (DVT) of proximal vein of both lower extremities (HCC)  Patient has a history of 2 provoked DVTs in the past and also has had multiple episodes of superficial thrombophlebitis. Positive family history of thrombosis in his mother. Decision in the past was made to continue him on indefinite anticoagulation which was later switched to prophylactic dose. He continues prophylactic Xarelto 10 mg daily without interruption which he is tolerating well. Sent back for reevaluation after he developed left lower extremity nonocclusive superficial thrombophlebitis of the left lower extremity while on anticoagulation. He states that he believes the event was provoked by him sitting at work 1 day for extended period of time without taking breaks/getting up to walk. His symptoms resolved within a week and he has not had any further issues. Would not necessarily consider this failure. Decision was made to complete his venous duplex of the lower extremities for completeness sake. If he is no longer with the superficial thrombophlebitis will likely continue current management without change. If there is any new/ongoing concerns may need to increase his dose to therapeutic Xarelto or switch to alternate. Patient agrees to the plan. Patient was told that we will contact him with the results once they become available and if negative/stable will likely see him PRN after her patient. - VAS lower limb venous duplex study, complete bilateral; Future    2.  Thrombophlebitis of superficial veins of left lower extremity  - VAS lower limb venous duplex study, complete bilateral; Future       HPI:  This is a 57-year-old male with history of hypertension, varicose veins status post superficial venous stripping of the right lower extremity of followed by a deep vein thrombosis in the right leg around 12 years ago. At that time the patient was on anticoagulation with Coumadin for about 3 months. He also developed multiple episodes of upper and lower extremity for superficial phlebitis. He also seems to have a family history of DVT including his mother who had had multiple thrombotic events. The patient stated that he was diagnosed with active COVID-19 infection around the middle of January 2021 and was sick only for 2 days around that time. He then developed some pain in the right lower extremity which was evaluated with a Doppler ultrasound on 01/13/2021. The study showed evidence of acute, nonocclusive deep vein thrombosis in the gastrocnemius veins in the proximal calf. He was then started on Eliquis which he continues to take it 5 mg twice a day. He was later switched to prophylactic dose Eliquis 2.5 mg twice a day which at some point was switched to Xarelto prophylactic dose 10 mg daily. Interval history:  Patient presents today for a follow-up visit for reevaluation as advised vascular surgery. He continues to take his prophylactic dose of Xarelto 10 mg daily without interruption. Feels well today has no new complaints. States that he has been compliant with wearing his compression stockings and has been working on healthier lifestyles to lose more weight/maintain his health. He states that several months ago August 2023 he was reviewing cases at work and sitting for an extended period of time without getting up to stretch/move which he typically does not do. Shortly thereafter he started to develop some discomfort and cramping to his left lower extremity. Given his history he presented to the emergency department for evaluation 8/4/2023 and had a left lower extremity venous duplex done which showed nonocclusive superficial thrombophlebitis.   He states his symptoms essentially resolved within a week of the event and has not had any further issues. VAS lower limb venous duplex study, unilateral/limited (8/4/23): Impression:  RIGHT LOWER LIMB LIMITED:  Evaluation shows no evidence of thrombus in the common femoral vein. Doppler evaluation shows a normal response to augmentation maneuvers. LEFT LOWER LIMB:  No evidence of acute or chronic deep vein thrombosis  No evidence of superficial thrombophlebitis noted in visualized segments of the  great saphenous vein. There is acute, non-occlusive superficial thrombophlebitis of the mid to distal  small saphenous vein in the calf. Doppler evaluation shows a normal response to augmentation maneuvers. Popliteal, posterior tibial and anterior tibial arterial Doppler waveform's are  triphasic. ROS: Review of Systems   Psychiatric/Behavioral:  Positive for sleep disturbance. All other systems reviewed and are negative.       Past Medical History:   Diagnosis Date    Allergic     BPH with obstruction/lower urinary tract symptoms     COVID-19 01/2021    Deep vein thrombosis (DVT) (HCC)     Erectile dysfunction due to arterial insufficiency     Hypertension     No known health problems     history of denial of any significant medical , no pertinent past medical history per allscripts    Testicular hypogonadism     Varicocele        Past Surgical History:   Procedure Laterality Date    EYE SURGERY      INGUINAL HERNIA REPAIR      UMBILICAL HERNIA REPAIR      per allscripts    VASECTOMY      VEIN LIGATION AND STRIPPING         Social History     Socioeconomic History    Marital status: /Civil Union     Spouse name: None    Number of children: 2    Years of education: None    Highest education level: None   Occupational History    Occupation:    Tobacco Use    Smoking status: Never    Smokeless tobacco: Never   Vaping Use    Vaping Use: Never used   Substance and Sexual Activity    Alcohol use: Yes     Comment: Occasionally    Drug use: No    Sexual activity: Yes     Partners: Female   Other Topics Concern    None   Social History Narrative    Daily caffeine use- 6 cups of coffee     Social Determinants of Health     Financial Resource Strain: Not on file   Food Insecurity: Not on file   Transportation Needs: Not on file   Physical Activity: Not on file   Stress: Not on file   Social Connections: Not on file   Intimate Partner Violence: Not on file   Housing Stability: Not on file       Family History   Problem Relation Age of Onset    Heart disease Mother     Heart attack Mother     Breast cancer Mother     Hypertension Mother     Cerebral aneurysm Mother     Prostate cancer Father     Bone cancer Father     Diabetes type II Father     Testicular cancer Cousin     Coronary artery disease Maternal Grandmother     Colon cancer Paternal Grandmother     No Known Problems Son     No Known Problems Son        Allergies   Allergen Reactions    Contrast Dye [Iodinated Contrast Media] Throat Swelling    Penicillins     Iodine - Food Allergy Other (See Comments)     Mild allergic like reaction to iohexol (self limited sensation of throat tightening, not requiring treatment).           Current Outpatient Medications:     lisinopril (ZESTRIL) 10 mg tablet, Take 1 tablet by mouth once daily, Disp: 90 tablet, Rfl: 0    Omega-3 1000 MG CAPS, Take by mouth in the morning, Disp: , Rfl:     Red Yeast Rice 600 MG TABS, Take by mouth in the morning, Disp: , Rfl:     sildenafil (VIAGRA) 25 MG tablet, 1 to 2 tablets p.o. daily as needed, Disp: 30 tablet, Rfl: 1    Xarelto 10 MG tablet, Take 1 tablet by mouth once daily, Disp: 90 tablet, Rfl: 1    fluticasone (FLONASE) 50 mcg/act nasal spray, 2 sprays into each nostril daily (Patient not taking: Reported on 9/13/2023), Disp: 1 g, Rfl: 0    loratadine (CLARITIN) 10 mg tablet, Take 1 tablet (10 mg total) by mouth daily (Patient not taking: Reported on 9/13/2023), Disp: 30 tablet, Rfl: 0      Physical Exam:  /80 (BP Location: Left arm, Patient Position: Sitting, Cuff Size: Adult)   Pulse 67   Temp (!) 97.2 °F (36.2 °C)   Resp 18   Ht 6' 1" (1.854 m)   Wt 112 kg (247 lb)   SpO2 99%   BMI 32.59 kg/m²     Physical Exam  Vitals reviewed. Constitutional:       General: He is not in acute distress. Appearance: He is well-developed. He is not diaphoretic. HENT:      Head: Normocephalic and atraumatic. Eyes:      General: Lids are normal. No scleral icterus. Conjunctiva/sclera: Conjunctivae normal.      Pupils: Pupils are equal, round, and reactive to light. Neck:      Thyroid: No thyromegaly. Cardiovascular:      Rate and Rhythm: Normal rate and regular rhythm. Heart sounds: Normal heart sounds. No murmur heard. Pulmonary:      Effort: Pulmonary effort is normal. No respiratory distress. Breath sounds: Normal breath sounds. Abdominal:      General: There is no distension. Palpations: Abdomen is soft. There is no hepatomegaly or splenomegaly. Tenderness: There is no abdominal tenderness. Musculoskeletal:         General: No swelling. Normal range of motion. Cervical back: Normal range of motion and neck supple. Lymphadenopathy:      Cervical: No cervical adenopathy. Upper Body:      Right upper body: No axillary adenopathy. Left upper body: No axillary adenopathy. Skin:     General: Skin is warm and dry. Findings: No erythema or rash. Neurological:      General: No focal deficit present. Mental Status: He is alert and oriented to person, place, and time. Psychiatric:         Mood and Affect: Mood and affect normal.         Behavior: Behavior normal. Behavior is cooperative. Thought Content: Thought content normal.         Judgment: Judgment normal.           Labs:  Lab Results   Component Value Date    WBC 6.40 08/08/2023    HGB 16.3 08/08/2023    HCT 49.2 08/08/2023    MCV 96 08/08/2023     08/08/2023        Patient voiced understanding and agreement in the above discussion. Aware to contact our office with questions/symptoms in the interim. This note has been generated by voice recognition software system. Therefore, there may be spelling, grammar, and or syntax errors. Please contact if questions arise.

## 2023-11-07 ENCOUNTER — HOSPITAL ENCOUNTER (OUTPATIENT)
Dept: NON INVASIVE DIAGNOSTICS | Facility: HOSPITAL | Age: 53
Discharge: HOME | End: 2023-11-07
Payer: COMMERCIAL

## 2023-11-07 DIAGNOSIS — I80.02 THROMBOPHLEBITIS OF SUPERFICIAL VEINS OF LEFT LOWER EXTREMITY: ICD-10-CM

## 2023-11-07 DIAGNOSIS — I82.5Y3 CHRONIC DEEP VEIN THROMBOSIS (DVT) OF PROXIMAL VEIN OF BOTH LOWER EXTREMITIES (HCC): Chronic | ICD-10-CM

## 2023-11-07 PROCEDURE — 93971 EXTREMITY STUDY: CPT | Performed by: SURGERY

## 2023-11-07 PROCEDURE — 93971 EXTREMITY STUDY: CPT

## 2023-12-08 ENCOUNTER — OFFICE VISIT (OUTPATIENT)
Dept: PODIATRY | Facility: CLINIC | Age: 53
End: 2023-12-08
Payer: COMMERCIAL

## 2023-12-08 VITALS
SYSTOLIC BLOOD PRESSURE: 125 MMHG | HEART RATE: 97 BPM | WEIGHT: 253 LBS | BODY MASS INDEX: 33.38 KG/M2 | DIASTOLIC BLOOD PRESSURE: 84 MMHG

## 2023-12-08 DIAGNOSIS — B35.3 TINEA PEDIS OF BOTH FEET: ICD-10-CM

## 2023-12-08 DIAGNOSIS — B35.1 ONYCHOMYCOSIS: Primary | ICD-10-CM

## 2023-12-08 PROCEDURE — 99203 OFFICE O/P NEW LOW 30 MIN: CPT | Performed by: PODIATRIST

## 2023-12-08 RX ORDER — KETOCONAZOLE 20 MG/G
CREAM TOPICAL DAILY
Qty: 60 G | Refills: 2 | Status: SHIPPED | OUTPATIENT
Start: 2023-12-08

## 2023-12-08 NOTE — PROGRESS NOTES
[address] Gibson 52 y.o. male MRN: [id number]    Assessment/Plan    No problem-specific Assessment & Plan notes found for this encounter. Diagnoses and all orders for this visit:    Onychomycosis    Tinea pedis of both feet         Plan:  - Pt seen/examined  - Rx given for ketoconazole to  begin tx topically of tinea pedis  - If pruritis is not resolved on the next visit, will consider the addition of topical steroid. - Discussed at length the difference in efficacy of the of topical OTC tx such as fungifoam, tea tree oil, formula 7, apple cider vinegar, vicks. Advised to consider topical therapy, Rx Penlac today as well. History of Present Illness     HPI:  Patient presents with concerns of thickened, yellowed, discolored nails. They have experienced itching to the feet. This has been present for months. They have attempted over the counter items, including iodine without success. They still having issues. Review of Systems   Constitutional: Negative. HENT: Negative. Eyes: Negative. Respiratory: Negative. Cardiovascular: Negative. Gastrointestinal: Negative. Musculoskeletal: neg   Skin: itchiness  Neurological: Negative.         Historical Information   Past Medical History:   Diagnosis Date    Allergic     BPH with obstruction/lower urinary tract symptoms     COVID-19 01/2021    Deep vein thrombosis (DVT) (HCC)     Erectile dysfunction due to arterial insufficiency     Hypertension     No known health problems     history of denial of any significant medical , no pertinent past medical history per allscripts    Testicular hypogonadism     Varicocele      Past Surgical History:   Procedure Laterality Date    EYE SURGERY      INGUINAL HERNIA REPAIR      UMBILICAL HERNIA REPAIR      per allscripts    VASECTOMY      VEIN LIGATION AND STRIPPING       Social History   Social History     Substance and Sexual Activity   Alcohol Use Yes    Comment: Occasionally Social History     Substance and Sexual Activity   Drug Use No     Social History     Tobacco Use   Smoking Status Never   Smokeless Tobacco Never     Family History:   Family History   Problem Relation Age of Onset    Heart disease Mother     Heart attack Mother     Breast cancer Mother     Hypertension Mother     Cerebral aneurysm Mother     Prostate cancer Father     Bone cancer Father     Diabetes type II Father     Testicular cancer Cousin     Coronary artery disease Maternal Grandmother     Colon cancer Paternal Grandmother     No Known Problems Son     No Known Problems Son        Meds/Allergies   (Not in a hospital admission)    Allergies   Allergen Reactions    Contrast Dye [Iodinated Contrast Media] Throat Swelling    Penicillins     Iodine - Food Allergy Other (See Comments)     Mild allergic like reaction to iohexol (self limited sensation of throat tightening, not requiring treatment). Objective   First Vitals:   @VSFIRST2(5,8,6,7,9,11,14,10:FIRST)@    Current Vitals:   Blood Pressure: 125/84 (12/08/23 0752)  Pulse: 97 (12/08/23 0752)  Weight - Scale: 115 kg (253 lb) (12/08/23 0752)        /84 (BP Location: Left arm, Patient Position: Sitting, Cuff Size: Large)   Pulse 97   Wt 115 kg (253 lb)   BMI 33.38 kg/m²     General Appearance:    Alert, cooperative, no distress    Constitutional: Patient is not distressed. Patient is well developed. Patient is  obese. Extremities:   MMT is 5/5 to all compartments of the LE, +0/4 edema B/L, Digital ROM is intact, Manual muscle testing 5 out of 5 for inversion/eversion/dorsiflexion/plantarflexion. Pulses:   R DP is +2/4, R PT is +2/4, L DP is +2/4, L PT is +2/4, CFT< 3sec to all digits. No erythema. No edema. No significant varicosities. Skin:   no open lesions. Normal texture, turgor. There is significant dry cracked skin in a moccasin type distribution to the Bilateral feet.  There is not interdigital involvement with dequamation of the skin interdigitally. Nails1,2,5 R, 1,3 are thickened, elongated, discolored, dystophic, hypertrophic, with significant subunugal debris. Dermatology:No open lesions. Present pedal hair. Skin has healthy turgor. Neurological: Monofilament sensation is intact. Vibratory sensation is intact. Achilles reflex is normal.   Proprioception is normal    Respiratory: Normal respiratory effort, no distress    Psych: Patient is AAOx3. Normal mood.      Lymphatic: nonpalpable popliteal lymph nodes  Nonpalpable groin lymph nodes

## 2023-12-08 NOTE — PATIENT INSTRUCTIONS
Nail Fungus   AMBULATORY CARE:   Nail fungus , or onychomycosis, is a fungal infection in your toenail or fingernail. Nail fungus is more common in toenails than fingernails. The cause of the infection may not be known. Common symptoms include the following:   Nails that curl up or down or are misshapen    Discolored (often white, yellow, or brown) nails    Fragile or cracked nails    Thick nails or nails with rough, jagged edges    Nail that is  from the nail bed    Tenderness or pain in the affected nail    Contact your healthcare provider if:  You have questions or concerns about your condition or care. Treatment for nail fungus  may include antifungal medicine and topical treatments. Antifungal medicine is a pill that treats a fungal infection. You may need to take this medicine for up to 12 weeks. Topical treatments include creams and polishes that you apply to the top of your nail. You may need to use topical treatments for up to 1 year before you see positive results. Ask your healthcare provider for more information about antifungal medicine. Manage your symptoms:   Use antifungal sprays or powders. You can buy these at your local drugstore. Keep your nails short  and file down any thick areas. Use separate nail trimmers and files for infected nails and healthy nails. If you go to a salon to get your nails done, bring your own nail files and trimmers. Prevent nail fungus:   Dry your feet with a towel  or hair dryer after you bathe. Do not wear tight-fitting shoes  or shoes that pinch your toes. Avoid shoes made from rubber or plastic. Wear socks that absorb moisture. This includes socks make of wool, nylon, or polypropylene. Do not wear cotton socks. Change your socks if they are damp from sweat or your feet get wet. Put on dry, clean socks every day. Do not go barefoot  in locker rooms or public showers.     Do not use nail polish  or artificial nails such as acrylic or gel nails.     Wear gloves that are waterproof  if you work with water. Follow up with your doctor as directed:  Write down your questions so you remember to ask them during your visits. © Copyright [employer] 2023 Information is for End User's use only and may not be sold, redistributed or otherwise used for commercial purposes. The above information is an  only. It is not intended as medical advice for individual conditions or treatments. Talk to your doctor, nurse or pharmacist before following any medical regimen to see if it is safe and effective for you. Athlete's Foot   WHAT YOU NEED TO KNOW:   Athlete's foot is a foot infection caused by a fungus. DISCHARGE INSTRUCTIONS:   Return to the emergency department if:   You have a fever or chills. You have red streaks going up your leg. Contact your healthcare provider if:   Your infection spreads to other parts of your body. Your infection is not better in 14 days or is not completely gone in 90 days. The skin on your foot or leg is red and hot. You have questions or concerns about your condition or care. Medicines:   Antifungal medicine  may be given as a cream or pill. You may need a doctor's order for this medicine. Take the medicine until it is gone, even if your feet look like they are healed. Take your medicine as directed. Contact your healthcare provider if you think your medicine is not helping or if you have side effects. Tell him or her if you are allergic to any medicine. Keep a list of the medicines, vitamins, and herbs you take. Include the amounts, and when and why you take them. Bring the list or the pill bottles to follow-up visits. Carry your medicine list with you in case of an emergency. Follow up with your doctor as directed:  Write down your questions so you remember to ask them during your visits.    Prevent the spread of athlete's foot:   Prevent the spread of this infection to other parts of your body.  When you shower, dry your groin area and other parts of your body before you dry your feet. Keep your feet clean and dry. Wash your feet each day and dry them well, especially between your toes. After your feet are dry, put powder on your feet and between your toes. Wear clean cotton or wool socks each day. Put your socks on first so you do not spread the infection to other areas of your body. Wear sandals, canvas tennis shoes, or other shoes that allow air to flow to your feet. This helps keep your feet dry. Do not use shoes that are tight, or made of plastic or rubber. Soak your feet in an astringent (drying) solution as directed  if you have blisters. You may need to do this for 20 to 30 minutes, 2 times each day to help dry out the blisters. Wear shoes in public areas. Wear shower shoes or sandals in warm, damp areas. This includes shower stalls, near swimming pools, and locker rooms. Do not share socks or shoes. Do not use public swimming pools. © Copyright [facility] 2022 Information is for End User's use only and may not be sold, redistributed or otherwise used for commercial purposes. All illustrations and images included in CareNotes® are the copyrighted property of [employer] or [address] [place] [address]  The above information is an  only. It is not intended as medical advice for individual conditions or treatments. Talk to your doctor, nurse or pharmacist before following any medical regimen to see if it is safe and effective for you.

## 2023-12-12 DIAGNOSIS — I10 BENIGN ESSENTIAL HYPERTENSION: ICD-10-CM

## 2023-12-12 DIAGNOSIS — J30.1 SEASONAL ALLERGIC RHINITIS DUE TO POLLEN: ICD-10-CM

## 2023-12-12 RX ORDER — LISINOPRIL 10 MG/1
TABLET ORAL
Qty: 90 TABLET | Refills: 0 | Status: SHIPPED | OUTPATIENT
Start: 2023-12-12

## 2023-12-13 ENCOUNTER — APPOINTMENT (OUTPATIENT)
Dept: LAB | Facility: CLINIC | Age: 53
End: 2023-12-13
Payer: COMMERCIAL

## 2023-12-13 DIAGNOSIS — R93.3 ABNORMAL FINDINGS ON DIAGNOSTIC IMAGING OF OTHER PARTS OF DIGESTIVE TRACT: ICD-10-CM

## 2023-12-13 DIAGNOSIS — Z86.010 PERSONAL HISTORY OF COLONIC POLYPS: ICD-10-CM

## 2023-12-13 DIAGNOSIS — R10.84 GENERALIZED ABDOMINAL PAIN: ICD-10-CM

## 2023-12-13 DIAGNOSIS — R19.5 OTHER FECAL ABNORMALITIES: ICD-10-CM

## 2023-12-13 PROCEDURE — 36415 COLL VENOUS BLD VENIPUNCTURE: CPT

## 2023-12-13 PROCEDURE — 86258 DGP ANTIBODY EACH IG CLASS: CPT

## 2023-12-13 PROCEDURE — 86231 EMA EACH IG CLASS: CPT

## 2023-12-13 PROCEDURE — 86364 TISS TRNSGLTMNASE EA IG CLAS: CPT

## 2023-12-13 PROCEDURE — 82784 ASSAY IGA/IGD/IGG/IGM EACH: CPT

## 2023-12-14 LAB
ENDOMYSIUM IGA SER QL: NEGATIVE
GLIADIN PEPTIDE IGA SER-ACNC: 6 UNITS (ref 0–19)
GLIADIN PEPTIDE IGG SER-ACNC: 4 UNITS (ref 0–19)
IGA SERPL-MCNC: 148 MG/DL (ref 90–386)
TTG IGA SER-ACNC: <2 U/ML (ref 0–3)
TTG IGG SER-ACNC: 3 U/ML (ref 0–5)

## 2024-01-03 DIAGNOSIS — Z86.718 HISTORY OF DVT (DEEP VEIN THROMBOSIS): ICD-10-CM

## 2024-01-04 RX ORDER — RIVAROXABAN 10 MG/1
TABLET, FILM COATED ORAL
Qty: 90 TABLET | Refills: 3 | Status: SHIPPED | OUTPATIENT
Start: 2024-01-04

## 2024-01-05 ENCOUNTER — TELEPHONE (OUTPATIENT)
Dept: HEMATOLOGY ONCOLOGY | Facility: CLINIC | Age: 54
End: 2024-01-05

## 2024-01-05 NOTE — TELEPHONE ENCOUNTER
Patient Call    Who are you speaking with? Patient    If it is not the patient, are they listed on an active communication consent form? N/A   What is the reason for this call? Needs a written clearance for him to stop the Xarelto for 2 days for his colonoscopy. It can be faxed to [phone]. It is set for 1/11/24   Does this require a call back? Yes   If a call back is required, please list best call back number [phone]   If a call back is required, advise that a message will be forwarded to their care team and someone will return their call as soon as possible.   Did you relay this information to the patient? Yes

## 2024-01-14 ENCOUNTER — HOSPITAL ENCOUNTER (EMERGENCY)
Facility: HOSPITAL | Age: 54
Discharge: HOME | End: 2024-01-14
Attending: EMERGENCY MEDICINE
Payer: COMMERCIAL

## 2024-01-14 VITALS
WEIGHT: 238 LBS | OXYGEN SATURATION: 97 % | TEMPERATURE: 97.2 F | SYSTOLIC BLOOD PRESSURE: 138 MMHG | HEART RATE: 92 BPM | RESPIRATION RATE: 18 BRPM | BODY MASS INDEX: 31.4 KG/M2 | DIASTOLIC BLOOD PRESSURE: 80 MMHG

## 2024-01-14 DIAGNOSIS — M79.604 RIGHT LEG PAIN: Primary | ICD-10-CM

## 2024-01-14 PROCEDURE — 99283 EMERGENCY DEPT VISIT LOW MDM: CPT

## 2024-01-14 PROCEDURE — 99284 EMERGENCY DEPT VISIT MOD MDM: CPT | Performed by: EMERGENCY MEDICINE

## 2024-01-14 NOTE — DISCHARGE INSTRUCTIONS
Return to the ER for any new, concerning, or worsening issues.    Please call to schedule an ultrasound as outpatient.    Continue current medication.    You may utilize warm compresses to the area 4-6 times a day for 10 to 15 minutes at a time.

## 2024-01-14 NOTE — ED PROVIDER NOTES
History  Chief Complaint   Patient presents with    Leg Pain     Right calf pain since yesterday. Patient was off thinners x 3 days for colonoscopy. Has been restarted.      53-year-old male presents to the emergency department complaining of intermittent mild right lateral lower calf pain which has started over the last 2 days.  The patient notes that he has had a history of DVT in the past and is chronically on Xarelto.  The patient was off it for 3 days for a colonoscopy and has restarted it and has been on it for the last 3 or 4 days.  The patient denies any chest pain or shortness of breath but is here for evaluation for the concern that he may have developed this clot.  The patient however is under treatment for chronic clot and has restarted his medicine.  Denies any significant edema or discoloration.        Prior to Admission Medications   Prescriptions Last Dose Informant Patient Reported? Taking?   Omega-3 1000 MG CAPS  Self Yes No   Sig: Take by mouth in the morning   Red Yeast Rice 600 MG TABS  Self Yes No   Sig: Take by mouth in the morning   ciclopirox (PENLAC) 8 % solution   No No   Sig: Apply topically daily at bedtime   fluticasone (FLONASE) 50 mcg/act nasal spray  Self No No   Si sprays into each nostril daily   Patient not taking: Reported on 2023   ketoconazole (NIZORAL) 2 % cream   No No   Sig: Apply topically daily   lisinopril (ZESTRIL) 10 mg tablet   No No   Sig: Take 1 tablet by mouth once daily   loratadine (CLARITIN) 10 mg tablet  Self No No   Sig: Take 1 tablet (10 mg total) by mouth daily   Patient not taking: Reported on 2023   rivaroxaban (Xarelto) 10 mg tablet   No No   Sig: Take 1 tablet by mouth once daily   sildenafil (VIAGRA) 25 MG tablet  Self No No   Si to 2 tablets p.o. daily as needed      Facility-Administered Medications: None       Past Medical History:   Diagnosis Date    Allergic     BPH with obstruction/lower urinary tract symptoms     COVID-19 2021     Deep vein thrombosis (DVT) (HCC)     Erectile dysfunction due to arterial insufficiency     Hypertension     No known health problems     history of denial of any significant medical , no pertinent past medical history per allscripts    Testicular hypogonadism     Varicocele        Past Surgical History:   Procedure Laterality Date    EYE SURGERY      INGUINAL HERNIA REPAIR      UMBILICAL HERNIA REPAIR      per allscripts    VASECTOMY      VEIN LIGATION AND STRIPPING         Family History   Problem Relation Age of Onset    Heart disease Mother     Heart attack Mother     Breast cancer Mother     Hypertension Mother     Cerebral aneurysm Mother     Prostate cancer Father     Bone cancer Father     Diabetes type II Father     Testicular cancer Cousin     Coronary artery disease Maternal Grandmother     Colon cancer Paternal Grandmother     No Known Problems Son     No Known Problems Son      I have reviewed and agree with the history as documented.    E-Cigarette/Vaping    E-Cigarette Use Never User      E-Cigarette/Vaping Substances    Nicotine No     THC No     CBD No     Flavoring No     Other No     Unknown No      Social History     Tobacco Use    Smoking status: Never    Smokeless tobacco: Never   Vaping Use    Vaping status: Never Used   Substance Use Topics    Alcohol use: Yes     Comment: Occasionally    Drug use: No       Review of Systems   Constitutional:  Negative for chills and fever.   HENT:  Negative for ear pain and sore throat.    Eyes:  Negative for pain and visual disturbance.   Respiratory:  Negative for cough and shortness of breath.    Cardiovascular:  Negative for chest pain and palpitations.   Gastrointestinal:  Negative for abdominal pain and vomiting.   Genitourinary:  Negative for dysuria and hematuria.   Musculoskeletal:  Positive for myalgias. Negative for arthralgias and back pain.   Skin:  Negative for color change and rash.   All other systems reviewed and are  negative.      Physical Exam  Physical Exam  Vitals and nursing note reviewed.   Constitutional:       General: He is not in acute distress.     Appearance: He is well-developed.   HENT:      Head: Normocephalic and atraumatic.   Eyes:      Conjunctiva/sclera: Conjunctivae normal.   Cardiovascular:      Rate and Rhythm: Normal rate and regular rhythm.      Heart sounds: No murmur heard.  Pulmonary:      Effort: Pulmonary effort is normal. No respiratory distress.      Breath sounds: Normal breath sounds.   Abdominal:      Palpations: Abdomen is soft.      Tenderness: There is no abdominal tenderness.   Musculoskeletal:         General: Tenderness present. No swelling.      Cervical back: Neck supple.      Comments: Patient without any significant edema on examination.  There is no calf tenderness to squeezing on my evaluation.  Patient is neurovascular intact to the right lower extremity which were over 4 dorsalis pedis and posterior tibial pulses.  There is no rash.   Skin:     General: Skin is warm and dry.      Capillary Refill: Capillary refill takes less than 2 seconds.   Neurological:      Mental Status: He is alert.   Psychiatric:         Mood and Affect: Mood normal.         Vital Signs  ED Triage Vitals [01/14/24 0854]   Temperature Pulse Respirations Blood Pressure SpO2   (!) 97.2 °F (36.2 °C) 92 18 138/80 97 %      Temp Source Heart Rate Source Patient Position - Orthostatic VS BP Location FiO2 (%)   Temporal Monitor Sitting Left arm --      Pain Score       No Pain           Vitals:    01/14/24 0854   BP: 138/80   Pulse: 92   Patient Position - Orthostatic VS: Sitting         Visual Acuity      ED Medications  Medications - No data to display    Diagnostic Studies  Results Reviewed       None                   VAS upper limb venous duplex scan, unilateral/limited    (Results Pending)              Procedures  Procedures         ED Course  ED Course as of 01/14/24 0910   Sun Jan 14, 2024   0908 Patient  offered to get venous ultrasound however the patient is currently on Xarelto.  The patient restarted after being off it for a colonoscopy.  The patient was offered to get the ultrasound versus a prescription for it being that if it were positive, he would not be told to follow-up with his doctors and continue his Xarelto.  Patient understands and agrees to get it done outpatient.  Prescription written                               SBIRT 22yo+      Flowsheet Row Most Recent Value   Initial Alcohol Screen: US AUDIT-C     1. How often do you have a drink containing alcohol? 0 Filed at: 01/14/2024 0855   2. How many drinks containing alcohol do you have on a typical day you are drinking?  0 Filed at: 01/14/2024 0855   3a. Male UNDER 65: How often do you have five or more drinks on one occasion? 0 Filed at: 01/14/2024 0855   3b. FEMALE Any Age, or MALE 65+: How often do you have 4 or more drinks on one occassion? 0 Filed at: 01/14/2024 0855   Audit-C Score 0 Filed at: 01/14/2024 0855   ELVIRA: How many times in the past year have you...    Used an illegal drug or used a prescription medication for non-medical reasons? Never Filed at: 01/14/2024 0855                      Medical Decision Making  53-year-old male presents emergency department complaining of mild pain to his right lateral calf which has been ongoing for the last 2 days or so.  The patient denies any trauma or any fever or any rash.  Patient is here for evaluation with concern for possible DVT because he had them in the past, as well as superficial thrombophlebitis.  Patient notes that he was off Xarelto for a few days for a colonoscopy but has recently restarted it.  The patient was concerned that maybe he developed a DVT.  The patient's leg is neurovascularly intact and does not appear to be significantly edematous and is nontender to palpation.  Differential diagnosis at the time evaluation is superficial thrombophlebitis, musculoskeletal strain or less  likely DVT.  The patient was offered to get an ultrasound while in the emergency department however the patient is currently on the treatment for it and has restarted his medication.  It would be reasonable for the patient to get the ultrasound done as outpatient.  The patient was offered either option while in the ER and he has decided to get it done outpatient.  Prescription given.  Patient discharged             Disposition  Final diagnoses:   Right leg pain     Time reflects when diagnosis was documented in both MDM as applicable and the Disposition within this note       Time User Action Codes Description Comment    1/14/2024  9:06 AM Brian Sanders Add [M79.604] Right leg pain           ED Disposition       ED Disposition   Discharge    Condition   Stable    Date/Time   Sun Jan 14, 2024 0906    Comment   Lucas Gibson discharge to home/self care.                   Follow-up Information    None         Patient's Medications   Discharge Prescriptions    No medications on file       Outpatient Discharge Orders   VAS upper limb venous duplex scan, unilateral/limited   Standing Status: Future Standing Exp. Date: 01/14/28       PDMP Review       None            ED Provider  Electronically Signed by             Brian Sanders Jr.,   01/14/24 0910

## 2024-02-19 ENCOUNTER — HOSPITAL ENCOUNTER (EMERGENCY)
Facility: HOSPITAL | Age: 54
Discharge: HOME | End: 2024-02-19
Attending: FAMILY MEDICINE
Payer: COMMERCIAL

## 2024-02-19 VITALS
SYSTOLIC BLOOD PRESSURE: 112 MMHG | RESPIRATION RATE: 20 BRPM | OXYGEN SATURATION: 99 % | DIASTOLIC BLOOD PRESSURE: 73 MMHG | HEART RATE: 108 BPM | TEMPERATURE: 97.6 F

## 2024-02-19 DIAGNOSIS — M54.50 ACUTE LOW BACK PAIN: Primary | ICD-10-CM

## 2024-02-19 PROCEDURE — 96372 THER/PROPH/DIAG INJ SC/IM: CPT

## 2024-02-19 PROCEDURE — 99282 EMERGENCY DEPT VISIT SF MDM: CPT

## 2024-02-19 PROCEDURE — 99284 EMERGENCY DEPT VISIT MOD MDM: CPT | Performed by: FAMILY MEDICINE

## 2024-02-19 RX ORDER — KETOROLAC TROMETHAMINE 30 MG/ML
60 INJECTION, SOLUTION INTRAMUSCULAR; INTRAVENOUS ONCE
Status: COMPLETED | OUTPATIENT
Start: 2024-02-19 | End: 2024-02-19

## 2024-02-19 RX ORDER — DEXAMETHASONE SODIUM PHOSPHATE 10 MG/ML
10 INJECTION, SOLUTION INTRAMUSCULAR; INTRAVENOUS ONCE
Status: COMPLETED | OUTPATIENT
Start: 2024-02-19 | End: 2024-02-19

## 2024-02-19 RX ORDER — LIDOCAINE 50 MG/G
1 PATCH TOPICAL DAILY
Qty: 5 PATCH | Refills: 0 | Status: SHIPPED | OUTPATIENT
Start: 2024-02-19 | End: 2024-02-24

## 2024-02-19 RX ORDER — METHYLPREDNISOLONE 4 MG/1
TABLET ORAL
Qty: 21 TABLET | Refills: 0 | Status: SHIPPED | OUTPATIENT
Start: 2024-02-19

## 2024-02-19 RX ORDER — METHOCARBAMOL 500 MG/1
500 TABLET, FILM COATED ORAL 2 TIMES DAILY
Qty: 20 TABLET | Refills: 0 | Status: SHIPPED | OUTPATIENT
Start: 2024-02-19

## 2024-02-19 RX ORDER — LIDOCAINE 50 MG/G
1 PATCH TOPICAL ONCE
Status: DISCONTINUED | OUTPATIENT
Start: 2024-02-19 | End: 2024-02-19 | Stop reason: HOSPADM

## 2024-02-19 RX ORDER — METHOCARBAMOL 500 MG/1
500 TABLET, FILM COATED ORAL ONCE
Status: COMPLETED | OUTPATIENT
Start: 2024-02-19 | End: 2024-02-19

## 2024-02-19 RX ADMIN — KETOROLAC TROMETHAMINE 60 MG: 30 INJECTION, SOLUTION INTRAMUSCULAR; INTRAVENOUS at 13:02

## 2024-02-19 RX ADMIN — DEXAMETHASONE SODIUM PHOSPHATE 10 MG: 10 INJECTION, SOLUTION INTRAMUSCULAR; INTRAVENOUS at 13:05

## 2024-02-19 RX ADMIN — METHOCARBAMOL TABLETS 500 MG: 500 TABLET, COATED ORAL at 13:01

## 2024-02-19 RX ADMIN — LIDOCAINE 5% 1 PATCH: 700 PATCH TOPICAL at 13:01

## 2024-02-19 NOTE — ED PROVIDER NOTES
History  Chief Complaint   Patient presents with    Back Pain     Lower back pain       Back Pain  Associated symptoms: no abdominal pain, no chest pain, no dysuria, no fever and no headaches    This 53-year-old male presented with complaint of lower back pain.  Patient states he moved a heavy dryer then-also yesterday since then has been having back pain.  He denies any trauma or injury to his back.  Patient states that he feels that that he tweaked his back.  He states he did not take anything for pain and came to the ED for pain medication.  Rating his pain 1 out of 10 at this time.  He states his pain is worse when he is moving around but feels okay when he is sitting upright.  He denies any urinary bowel incontinence.  Denies saddle paresthesia.  Denies any numbness or tingling.  Denies any weakness.  Otherwise stable awake alert and x 3 GCS 15.    Prior to Admission Medications   Prescriptions Last Dose Informant Patient Reported? Taking?   Omega-3 1000 MG CAPS  Self Yes No   Sig: Take by mouth in the morning   Red Yeast Rice 600 MG TABS  Self Yes No   Sig: Take by mouth in the morning   ciclopirox (PENLAC) 8 % solution   No No   Sig: Apply topically daily at bedtime   fluticasone (FLONASE) 50 mcg/act nasal spray  Self No No   Si sprays into each nostril daily   Patient not taking: Reported on 2023   ketoconazole (NIZORAL) 2 % cream   No No   Sig: Apply topically daily   lisinopril (ZESTRIL) 10 mg tablet   No No   Sig: Take 1 tablet by mouth once daily   loratadine (CLARITIN) 10 mg tablet  Self No No   Sig: Take 1 tablet (10 mg total) by mouth daily   Patient not taking: Reported on 2023   rivaroxaban (Xarelto) 10 mg tablet   No No   Sig: Take 1 tablet by mouth once daily   sildenafil (VIAGRA) 25 MG tablet  Self No No   Si to 2 tablets p.o. daily as needed      Facility-Administered Medications: None       Past Medical History:   Diagnosis Date    Allergic     BPH with obstruction/lower  urinary tract symptoms     COVID-19 01/2021    Deep vein thrombosis (DVT) (HCC)     Erectile dysfunction due to arterial insufficiency     Hypertension     No known health problems     history of denial of any significant medical , no pertinent past medical history per allscripts    Testicular hypogonadism     Varicocele        Past Surgical History:   Procedure Laterality Date    EYE SURGERY      INGUINAL HERNIA REPAIR      UMBILICAL HERNIA REPAIR      per allscripts    VASECTOMY      VEIN LIGATION AND STRIPPING         Family History   Problem Relation Age of Onset    Heart disease Mother     Heart attack Mother     Breast cancer Mother     Hypertension Mother     Cerebral aneurysm Mother     Prostate cancer Father     Bone cancer Father     Diabetes type II Father     Testicular cancer Cousin     Coronary artery disease Maternal Grandmother     Colon cancer Paternal Grandmother     No Known Problems Son     No Known Problems Son      I have reviewed and agree with the history as documented.    E-Cigarette/Vaping    E-Cigarette Use Never User      E-Cigarette/Vaping Substances    Nicotine No     THC No     CBD No     Flavoring No     Other No     Unknown No      Social History     Tobacco Use    Smoking status: Never    Smokeless tobacco: Never   Vaping Use    Vaping status: Never Used   Substance Use Topics    Alcohol use: Yes     Comment: Occasionally    Drug use: No       Review of Systems   Constitutional:  Negative for chills and fever.   HENT:  Negative for rhinorrhea and sore throat.    Eyes:  Negative for visual disturbance.   Respiratory:  Negative for cough and shortness of breath.    Cardiovascular:  Negative for chest pain and leg swelling.   Gastrointestinal:  Negative for abdominal pain, diarrhea, nausea and vomiting.   Genitourinary:  Negative for dysuria.   Musculoskeletal:  Positive for back pain. Negative for myalgias.   Skin:  Negative for rash.   Neurological:  Negative for dizziness and  headaches.   Psychiatric/Behavioral:  Negative for confusion.    All other systems reviewed and are negative.      Physical Exam  Physical Exam  Vitals and nursing note reviewed.   Constitutional:       General: He is not in acute distress.     Appearance: He is well-developed. He is not diaphoretic.   HENT:      Head: Normocephalic and atraumatic.      Right Ear: External ear normal.      Left Ear: External ear normal.      Nose: Nose normal.   Eyes:      Conjunctiva/sclera: Conjunctivae normal.      Pupils: Pupils are equal, round, and reactive to light.   Cardiovascular:      Rate and Rhythm: Normal rate and regular rhythm.   Pulmonary:      Effort: Pulmonary effort is normal. No respiratory distress.      Breath sounds: Normal breath sounds. No wheezing.   Abdominal:      General: Bowel sounds are normal. There is no distension.      Palpations: Abdomen is soft.      Tenderness: There is no abdominal tenderness.   Musculoskeletal:         General: Tenderness (mild tenderness to palpation, no bruising step-off noted there is no midline tenderness) present.      Cervical back: Normal range of motion and neck supple.   Lymphadenopathy:      Cervical: No cervical adenopathy.   Skin:     General: Skin is warm and dry.      Capillary Refill: Capillary refill takes less than 2 seconds.   Neurological:      Mental Status: He is alert and oriented to person, place, and time.   Psychiatric:         Behavior: Behavior normal.         Vital Signs  ED Triage Vitals [02/19/24 1241]   Temperature Pulse Respirations Blood Pressure SpO2   97.6 °F (36.4 °C) (!) 108 20 112/73 99 %      Temp Source Heart Rate Source Patient Position - Orthostatic VS BP Location FiO2 (%)   Temporal Monitor Sitting Left arm --      Pain Score       3           Vitals:    02/19/24 1241   BP: 112/73   Pulse: (!) 108   Patient Position - Orthostatic VS: Sitting         Visual Acuity      ED Medications  Medications   lidocaine (LIDODERM) 5 % patch 1  patch (1 patch Topical Medication Applied 2/19/24 1301)   ketorolac (TORADOL) injection 60 mg (60 mg Intramuscular Given 2/19/24 1302)   dexamethasone (PF) (DECADRON) injection 10 mg (10 mg Intramuscular Given 2/19/24 1305)   methocarbamol (ROBAXIN) tablet 500 mg (500 mg Oral Given 2/19/24 1301)       Diagnostic Studies  Results Reviewed       None                   No orders to display              Procedures  Procedures         ED Course                                             Medical Decision Making  Patient with history as above presented with back pain. History obtained from patient.    Patient was nontoxic, stable. Ambulatory. Exam as above.     Reviewed external records.     Differential diagnosis considered. Overall presentation is consistent with low risk back pain. Low suspicion for cauda equina syndrome, cord compression, epidural abscess, osteomyelitis, pyelonephritis, AAA, dissection, or other serious cause of back pain.     Patient was treated with pain medications with improvement in symptoms.  Patient was offered imaging however he refused states that he feels comfortable not get imaging at this time since there was no trauma or injury.    Consideration was given for admission, but the patient was stable for outpatient management.     Disposition: Discussed need to follow up diagnostics, including incidental findings. Discharged with instructions to obtain outpatient follow up of patient's symptoms and findings, with strict return precautions if patient develops new or worsening symptoms.     Risk  Prescription drug management.             Disposition  Final diagnoses:   Acute low back pain     Time reflects when diagnosis was documented in both MDM as applicable and the Disposition within this note       Time User Action Codes Description Comment    2/19/2024 12:51 PM Lizandro Fair Add [M54.50] Acute low back pain           ED Disposition       ED Disposition   Discharge    Condition   Stable     Date/Time   Mon Feb 19, 2024 12:51 PM    Comment   Lucas Gibson discharge to home/self care.                   Follow-up Information       Follow up With Specialties Details Why Contact Info    Cliff Burrell, DO Family Medicine Schedule an appointment as soon as possible for a visit in 2 days If symptoms worsen [address] [place] [address] [place] [address]  [phone]              Patient's Medications   Discharge Prescriptions    LIDOCAINE (LIDODERM) 5 %    Apply 1 patch topically over 12 hours daily for 5 days Remove & Discard patch within 12 hours or as directed by MD       Start Date: 2/19/2024 End Date: 2/24/2024       Order Dose: 1 patch       Quantity: 5 patch    Refills: 0    METHOCARBAMOL (ROBAXIN) 500 MG TABLET    Take 1 tablet (500 mg total) by mouth 2 (two) times a day       Start Date: 2/19/2024 End Date: --       Order Dose: 500 mg       Quantity: 20 tablet    Refills: 0    METHYLPREDNISOLONE 4 MG TABLET THERAPY PACK    Use as directed on package       Start Date: 2/19/2024 End Date: --       Order Dose: --       Quantity: 21 tablet    Refills: 0           PDMP Review       None            ED Provider  Electronically Signed by             Lizandro Fair MD  02/19/24 2933

## 2024-02-29 ENCOUNTER — OFFICE VISIT (OUTPATIENT)
Dept: FAMILY MEDICINE CLINIC | Facility: CLINIC | Age: 54
End: 2024-02-29
Payer: COMMERCIAL

## 2024-02-29 VITALS
WEIGHT: 251.8 LBS | BODY MASS INDEX: 33.37 KG/M2 | TEMPERATURE: 98.5 F | OXYGEN SATURATION: 98 % | HEART RATE: 86 BPM | SYSTOLIC BLOOD PRESSURE: 120 MMHG | HEIGHT: 73 IN | DIASTOLIC BLOOD PRESSURE: 88 MMHG

## 2024-02-29 DIAGNOSIS — I10 BENIGN ESSENTIAL HYPERTENSION: Primary | ICD-10-CM

## 2024-02-29 DIAGNOSIS — Z12.5 SCREENING FOR PROSTATE CANCER: ICD-10-CM

## 2024-02-29 DIAGNOSIS — E78.00 HYPERCHOLESTEROLEMIA: ICD-10-CM

## 2024-02-29 DIAGNOSIS — Z23 ENCOUNTER FOR IMMUNIZATION: ICD-10-CM

## 2024-02-29 DIAGNOSIS — E66.09 CLASS 1 OBESITY DUE TO EXCESS CALORIES WITH SERIOUS COMORBIDITY AND BODY MASS INDEX (BMI) OF 33.0 TO 33.9 IN ADULT: ICD-10-CM

## 2024-02-29 DIAGNOSIS — I82.5Y3 CHRONIC DEEP VEIN THROMBOSIS (DVT) OF PROXIMAL VEIN OF BOTH LOWER EXTREMITIES (HCC): Chronic | ICD-10-CM

## 2024-02-29 PROBLEM — K65.4 MESENTERIC PANNICULITIS (HCC): Status: RESOLVED | Noted: 2021-08-04 | Resolved: 2024-02-29

## 2024-02-29 PROCEDURE — 99214 OFFICE O/P EST MOD 30 MIN: CPT | Performed by: FAMILY MEDICINE

## 2024-02-29 NOTE — PROGRESS NOTES
Assessment/Plan:    No problem-specific Assessment & Plan notes found for this encounter.       Diagnoses and all orders for this visit:    Benign essential hypertension  Comments:  no change in the medication  Orders:  -     CBC and differential; Future  -     TSH, 3rd generation with Free T4 reflex; Future    Hypercholesterolemia  Comments:  pt counseled on diet and exercise  Orders:  -     Comprehensive metabolic panel; Future  -     Lipid panel; Future    Encounter for immunization  -     influenza vaccine, quadrivalent, 0.5 mL, preservative-free, for adult and pediatric patients 6 mos+ (AFLURIA, FLUARIX, FLULAVAL, FLUZONE)    Chronic deep vein thrombosis (DVT) of proximal vein of both lower extremities (HCC)  Comments:  stable    Screening for prostate cancer  -     PSA, Total Screen; Future    Class 1 obesity due to excess calories with serious comorbidity and body mass index (BMI) of 33.0 to 33.9 in adult  Comments:  pt counseled on diet and exercise          PHQ-2/9 Depression Screening    Little interest or pleasure in doing things: 0 - not at all  Feeling down, depressed, or hopeless: 0 - not at all  PHQ-2 Score: 0  PHQ-2 Interpretation: Negative depression screen            Subjective:      Patient ID: Lucas Gibson is a 53 y.o. male.    Hypertension  This is a chronic problem. The current episode started more than 1 year ago. The problem is unchanged. The problem is controlled. Pertinent negatives include no chest pain, headaches or palpitations.       The following portions of the patient's history were reviewed and updated as appropriate: allergies, current medications, past family history, past medical history, past social history, past surgical history, and problem list.    Review of Systems   Eyes:  Negative for visual disturbance.   Cardiovascular:  Negative for chest pain and palpitations.   Neurological:  Negative for headaches.         Objective:    /88   Pulse 86   Temp 98.5 °F (36.9  "°C) (Tympanic)   Ht 6' 1\" (1.854 m)   Wt 114 kg (251 lb 12.8 oz)   SpO2 98%   BMI 33.22 kg/m²      Physical Exam  Vitals and nursing note reviewed.   Constitutional:       General: He is not in acute distress.     Appearance: Normal appearance. He is not ill-appearing, toxic-appearing or diaphoretic.   HENT:      Head: Normocephalic and atraumatic.   Eyes:      Conjunctiva/sclera: Conjunctivae normal.   Cardiovascular:      Rate and Rhythm: Normal rate and regular rhythm.      Heart sounds: Normal heart sounds. No murmur heard.  Pulmonary:      Effort: Pulmonary effort is normal. No respiratory distress.      Breath sounds: Normal breath sounds. No wheezing, rhonchi or rales.   Abdominal:      General: There is no distension.      Palpations: Abdomen is soft. There is no mass.      Tenderness: There is no abdominal tenderness. There is no guarding or rebound.   Musculoskeletal:      Cervical back: Normal range of motion and neck supple. No rigidity.      Right lower leg: No edema.      Left lower leg: No edema.   Lymphadenopathy:      Cervical: No cervical adenopathy.   Neurological:      Mental Status: He is alert and oriented to person, place, and time.   Psychiatric:         Mood and Affect: Mood normal.         Behavior: Behavior normal.         Thought Content: Thought content normal.         Judgment: Judgment normal.         "

## 2024-03-07 DIAGNOSIS — J30.1 SEASONAL ALLERGIC RHINITIS DUE TO POLLEN: ICD-10-CM

## 2024-03-07 DIAGNOSIS — I10 BENIGN ESSENTIAL HYPERTENSION: ICD-10-CM

## 2024-03-08 RX ORDER — LISINOPRIL 10 MG/1
TABLET ORAL
Qty: 90 TABLET | Refills: 0 | Status: SHIPPED | OUTPATIENT
Start: 2024-03-08

## 2024-05-25 ENCOUNTER — HOSPITAL ENCOUNTER (EMERGENCY)
Facility: HOSPITAL | Age: 54
Discharge: HOME | End: 2024-05-25
Attending: EMERGENCY MEDICINE | Admitting: EMERGENCY MEDICINE
Payer: COMMERCIAL

## 2024-05-25 ENCOUNTER — APPOINTMENT (EMERGENCY)
Dept: RADIOLOGY | Facility: HOSPITAL | Age: 54
End: 2024-05-25
Payer: COMMERCIAL

## 2024-05-25 VITALS
WEIGHT: 238 LBS | TEMPERATURE: 97.9 F | BODY MASS INDEX: 31.54 KG/M2 | RESPIRATION RATE: 20 BRPM | DIASTOLIC BLOOD PRESSURE: 92 MMHG | HEIGHT: 73 IN | HEART RATE: 96 BPM | OXYGEN SATURATION: 96 % | SYSTOLIC BLOOD PRESSURE: 147 MMHG

## 2024-05-25 DIAGNOSIS — M70.61 TROCHANTERIC BURSITIS OF RIGHT HIP: Primary | ICD-10-CM

## 2024-05-25 PROCEDURE — 99283 EMERGENCY DEPT VISIT LOW MDM: CPT

## 2024-05-25 PROCEDURE — 99284 EMERGENCY DEPT VISIT MOD MDM: CPT | Performed by: EMERGENCY MEDICINE

## 2024-05-25 PROCEDURE — 73502 X-RAY EXAM HIP UNI 2-3 VIEWS: CPT

## 2024-05-25 RX ORDER — PREDNISONE 20 MG/1
40 TABLET ORAL DAILY
Qty: 8 TABLET | Refills: 0 | Status: SHIPPED | OUTPATIENT
Start: 2024-05-26 | End: 2024-05-30

## 2024-05-25 RX ORDER — PREDNISONE 20 MG/1
40 TABLET ORAL ONCE
Status: COMPLETED | OUTPATIENT
Start: 2024-05-25 | End: 2024-05-25

## 2024-05-25 RX ADMIN — PREDNISONE 40 MG: 20 TABLET ORAL at 08:25

## 2024-05-25 NOTE — ED PROVIDER NOTES
History  Chief Complaint   Patient presents with    Hip Pain     Right Hip pain, no known injury. Painful to sit. Began yesterday while driving to work     54 yo R hip pain for a few days.  States is worse with changes in position and if he sitting too long.  Denies any other pain or issues.  No trauma.        Prior to Admission Medications   Prescriptions Last Dose Informant Patient Reported? Taking?   Omega-3 1000 MG CAPS  Self Yes No   Sig: Take by mouth in the morning   Red Yeast Rice 600 MG TABS  Self Yes No   Sig: Take by mouth in the morning   ciclopirox (PENLAC) 8 % solution   No No   Sig: Apply topically daily at bedtime   Patient not taking: Reported on 2024   fluticasone (FLONASE) 50 mcg/act nasal spray  Self No No   Si sprays into each nostril daily   ketoconazole (NIZORAL) 2 % cream   No No   Sig: Apply topically daily   Patient not taking: Reported on 2024   lidocaine (Lidoderm) 5 %   No No   Sig: Apply 1 patch topically over 12 hours daily for 5 days Remove & Discard patch within 12 hours or as directed by MD   Patient not taking: Reported on 2024   lisinopril (ZESTRIL) 10 mg tablet   No No   Sig: Take 1 tablet by mouth once daily   loratadine (CLARITIN) 10 mg tablet  Self No No   Sig: Take 1 tablet (10 mg total) by mouth daily   methocarbamol (ROBAXIN) 500 mg tablet   No No   Sig: Take 1 tablet (500 mg total) by mouth 2 (two) times a day   Patient not taking: Reported on 2024   methylPREDNISolone 4 MG tablet therapy pack   No No   Sig: Use as directed on package   Patient not taking: Reported on 2024   rivaroxaban (Xarelto) 10 mg tablet   No No   Sig: Take 1 tablet by mouth once daily   sildenafil (VIAGRA) 25 MG tablet  Self No No   Si to 2 tablets p.o. 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 was advised to have repeat in 6-12 months. Has been ordered already pending. Patient needs to have scheduled    No known health problems     history of denial of any significant medical , no pertinent past medical history per allscripts    Testicular hypogonadism     Varicocele        Past Surgical History:   Procedure Laterality Date    EYE SURGERY      INGUINAL HERNIA REPAIR      UMBILICAL HERNIA REPAIR      per allscripts    VASECTOMY      VEIN LIGATION AND STRIPPING         Family History   Problem Relation Age of Onset    Heart disease Mother     Heart attack Mother     Breast cancer Mother     Hypertension Mother     Cerebral aneurysm Mother     Prostate cancer Father     Bone cancer Father     Diabetes type II Father     Testicular cancer Cousin     Coronary artery disease Maternal Grandmother     Colon cancer Paternal Grandmother     No Known Problems Son     No Known Problems Son      I have reviewed and agree with the history as documented.    E-Cigarette/Vaping    E-Cigarette Use Never User      E-Cigarette/Vaping Substances    Nicotine No     THC No     CBD No     Flavoring No     Other No     Unknown No      Social History     Tobacco Use    Smoking status: Never    Smokeless tobacco: Never   Vaping Use    Vaping status: Never Used   Substance Use Topics    Alcohol use: Yes     Comment: Occasionally    Drug use: No       Review of Systems   Musculoskeletal:  Positive for arthralgias (R hip pain).   All other systems reviewed and are negative.      Physical Exam  Physical Exam  General: VS reviewed  Appears in NAD  awake, alert.   Well-nourished, well-developed. Appears stated age.   Speaking normally in full sentences.   Head: Normocephalic, atraumatic  Eyes: EOM-I. No diplopia.   No hyphema.   No subconjunctival hemorrhages.  Symmetrical lids.   ENT:  Atraumatic external nose and ears.    MMM  No malocclusion. No stridor. Normal phonation. No drooling. Normal swallowing.   Neck: No JVD.  CV: No pallor noted  Lungs:   No tachypnea  No respiratory distress  MSK:   FROM spontaneously  Able to ambulate but with antalgia  Tenderness to palpation over R greater trochanter  No erythema, induration, fluctuance, ecchymosis, crepitus, deformity noted on overlying skin exam  Skin: Dry, intact.   Neuro: Awake, alert, GCS15, CN II-XII grossly intact.   Motor grossly intact.  Psychiatric/Behavioral: Appropriate mood and affect   Exam: deferred    Vital Signs  ED Triage Vitals [05/25/24 0731]   Temperature Pulse Respirations Blood Pressure SpO2   97.9 °F (36.6 °C) 96 20 147/92 96 %      Temp Source Heart Rate Source Patient Position - Orthostatic VS BP Location FiO2 (%)   Temporal Monitor Sitting Left arm --      Pain Score       3           Vitals:    05/25/24 0731   BP: 147/92   Pulse: 96   Patient Position - Orthostatic VS: Sitting         Visual Acuity      ED Medications  Medications   predniSONE tablet 40 mg (has no administration in time range)       Diagnostic Studies  Results Reviewed       None                   XR hip/pelv 2-3 vws right if performed   ED Interpretation by Vincent Montes DO (05/25 0820)   No acute fracture or dislocation noted on my interpretation                     Procedures  Procedures         ED Course                                             Medical Decision Making  53-year-old male with likely greater trochanteric bursitis.  Will treat with steroids.  Did offer patient pain medication at this time which he declined.  Will refer to orthopedics.    Amount and/or Complexity of Data Reviewed  Radiology: ordered.    Risk  Prescription drug management.             Disposition  Final diagnoses:   Trochanteric bursitis of right hip     Time reflects when diagnosis was documented in both MDM as applicable and the Disposition within this note        Time User Action Codes Description Comment    5/25/2024  8:19 AM Vincent Montes Add [M70.61] Trochanteric bursitis of right hip           ED Disposition       ED Disposition   Discharge    Condition   Stable    Date/Time   Sat May 25, 2024  8:19 AM    Comment   Lucas Gibson discharge to home/self care.                   Follow-up Information       Follow up With Specialties Details Why Contact Info Additional Information    Cliff Burrell DO Family Medicine   [address] [facility]  [facility] [address]  [phone]       [facility] Emergency Department Emergency Medicine Go to  As needed, If symptoms worsen [address] [facility] [address]  [phone] [facility] Emergency Department, [address] [facility], [address] [facility] [place] [facility] Schedule an appointment as soon as possible for a visit   [address] [place] [address] Ave  [facility] [address] [phone] [facility] [place], [facility] [address], [place], [address], [phone]            Patient's Medications   Discharge Prescriptions    PREDNISONE 20 MG TABLET    Take 2 tablets (40 mg total) by mouth daily for 4 days Do not start before May 26, 2024.       Start Date: 5/26/2024 End Date: 5/30/2024       Order Dose: 40 mg       Quantity: 8 tablet    Refills: 0           PDMP Review       None            ED Provider  Electronically Signed by             Vincent Montes DO  05/25/24 0821

## 2024-06-05 ENCOUNTER — OFFICE VISIT (OUTPATIENT)
Dept: OBGYN CLINIC | Facility: CLINIC | Age: 54
End: 2024-06-05
Payer: COMMERCIAL

## 2024-06-05 VITALS
DIASTOLIC BLOOD PRESSURE: 91 MMHG | BODY MASS INDEX: 32.07 KG/M2 | WEIGHT: 242 LBS | HEIGHT: 73 IN | SYSTOLIC BLOOD PRESSURE: 129 MMHG | HEART RATE: 88 BPM

## 2024-06-05 DIAGNOSIS — M70.61 TROCHANTERIC BURSITIS OF RIGHT HIP: ICD-10-CM

## 2024-06-05 DIAGNOSIS — G57.01 PIRIFORMIS SYNDROME OF RIGHT SIDE: Primary | ICD-10-CM

## 2024-06-05 PROCEDURE — 99214 OFFICE O/P EST MOD 30 MIN: CPT | Performed by: STUDENT IN AN ORGANIZED HEALTH CARE EDUCATION/TRAINING PROGRAM

## 2024-06-05 NOTE — PROGRESS NOTES
Ortho Sports Medicine New Patient Hip Visit    Assesment:   53 y.o. male with right hip pain for 2 weeks without a specific injury and exam consistent with piriformis syndrome    Plan:  I reviewed the history, exam, and imaging with the patient in clinic today.  I did review the patient's x-rays which show no significant degenerative changes or other osseous abnormalities.  On exam, the patient does not have any tenderness over the lateral aspect of the hip.  He did state that pain was present there previously but he did have prednisone taper provided the ED which has improved those symptoms.  He states that his pain is more posterior over the sacrum evocative maneuvers consistent with piriformis syndrome.  He denies any pain radiating down the leg or numbness or tingling to suggest lumbar radiculopathy at this time.  However, there may be some component of lumbar radiculopathy contributing to his symptoms.  I discussed with the patient I recommend starting with physical therapy at this time.  Patient was amenable to this plan.  I provided him with prescription physical therapy.   I will plan to see the patient back in 6 weeks for repeat evaluation.  Patient demonstrated understanding of the discussion and was in agreement with the plan.  All of his questions were answered.  He can reach out to clinic with any question or concerns anytime.    Conservative Treatment:   Ice to hip region for 20 minutes at least 1-2 times daily.  PT for ROM/strengthening to hip, back, legs and core  Voltaren gel.  Tylenol as needed    Imaging:  All imaging from today was reviewed by myself and explained to the patient.     Injection:    No Injection planned at this time.    Surgery:  No surgery is recommended at this point, continue with conservative treatment plan as noted.    Follow up:  Return in about 6 weeks (around 7/17/2024) for Recheck.        Chief Complaint   Patient presents with    Right Hip - Pain       History of Present  Illness:  The patient is a 53 y.o. male seen in clinic for Right hip pain. The pain started 1-2 weeks  ago. The is no injury reported. He's states he noticed increased generalized hip pain when he would sit and then stand. He went to the ED as it felt like he pulled a muscle and was prescribed steroid. He reports no pain while on the steroids but once he was off his  pain returned. He states his hip pain improved and is more focal to his buttock area now. His biggest complaint is sitting in his car for 40 min to work and then getting out to walk. His his feel stiff and he has buttock pain. He denies any radiating pain  He states he has been using Voltaren gel.         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 was advised to have repeat in 6-12 months. Has been ordered already pending. Patient needs to have scheduled    No known health problems     history of denial of any significant medical , no pertinent past medical history per allscripts    Testicular hypogonadism     Varicocele      Past Surgical History:   Procedure Laterality Date    EYE SURGERY      INGUINAL HERNIA REPAIR      UMBILICAL HERNIA REPAIR      per allscripts    VASECTOMY      VEIN LIGATION AND STRIPPING       Allergies   Allergen Reactions    Contrast Dye [Iodinated Contrast Media] Throat Swelling    Penicillins      Current Outpatient Medications on File Prior to Visit   Medication Sig Dispense Refill    fluticasone (FLONASE) 50 mcg/act nasal spray 2 sprays into each nostril daily 1 g 0    lisinopril (ZESTRIL) 10 mg tablet Take 1 tablet by mouth once daily 90 tablet 0    loratadine (CLARITIN) 10 mg tablet Take 1 tablet  (10 mg total) by mouth daily 30 tablet 0    Omega-3 1000 MG CAPS Take by mouth in the morning      Red Yeast Rice 600 MG TABS Take by mouth in the morning      rivaroxaban (Xarelto) 10 mg tablet Take 1 tablet by mouth once daily 90 tablet 3    sildenafil (VIAGRA) 25 MG tablet 1 to 2 tablets p.o. daily as needed 30 tablet 1    ciclopirox (PENLAC) 8 % solution Apply topically daily at bedtime (Patient not taking: Reported on 2/29/2024) 6.6 mL 2    ketoconazole (NIZORAL) 2 % cream Apply topically daily (Patient not taking: Reported on 2/29/2024) 60 g 2    lidocaine (Lidoderm) 5 % Apply 1 patch topically over 12 hours daily for 5 days Remove & Discard patch within 12 hours or as directed by MD (Patient not taking: Reported on 2/29/2024) 5 patch 0    methocarbamol (ROBAXIN) 500 mg tablet Take 1 tablet (500 mg total) by mouth 2 (two) times a day (Patient not taking: Reported on 2/29/2024) 20 tablet 0    methylPREDNISolone 4 MG tablet therapy pack Use as directed on package (Patient not taking: Reported on 2/29/2024) 21 tablet 0    [DISCONTINUED] aspirin 325 mg tablet Take 1 tablet by mouth       No current facility-administered medications on file prior to visit.     Social History     Socioeconomic History    Marital status: /Civil Union     Spouse name: Not on file    Number of children: 2    Years of education: Not on file    Highest education level: Not on file   Occupational History    Occupation:    Tobacco Use    Smoking status: Never    Smokeless tobacco: Never   Vaping Use    Vaping status: Never Used   Substance and Sexual Activity    Alcohol use: Yes     Comment: Occasionally    Drug use: No    Sexual activity: Yes     Partners: Female   Other Topics Concern    Not on file   Social History Narrative    Daily caffeine use- 6 cups of coffee     Social Determinants of Health     Financial Resource Strain: Not on file   Food Insecurity: Not on file   Transportation Needs: Not on file  "  Physical Activity: Not on file   Stress: Not on file   Social Connections: Not on file   Intimate Partner Violence: Not on file   Housing Stability: Not on file         I have reviewed the past medical, surgical, social and family history, medications and allergies as documented in the EMR.    Review of systems: ROS is negative other than that noted in the HPI.  Constitutional: Negative for fatigue and fever.   HENT: Negative for sore throat.    Respiratory: Negative for shortness of breath.    Cardiovascular: Negative for chest pain.   Gastrointestinal: Negative for abdominal pain.   Endocrine: Negative for cold intolerance and heat intolerance.   Genitourinary: Negative for flank pain.   Musculoskeletal: Negative for back pain.   Skin: Negative for rash.   Allergic/Immunologic: Negative for immunocompromised state.   Neurological: Negative for dizziness.   Psychiatric/Behavioral: Negative for agitation.      Physical Exam:    Blood pressure 129/91, pulse 88, height 6' 1\" (1.854 m), weight 110 kg (242 lb).    General/Constitutional: NAD, well developed, well nourished  HENT: Normocephalic, atraumatic  CV: Intact distal pulses, regular rate  Resp: No respiratory distress or labored breathing  Neuro: Alert and Oriented x 3  Psych: Normal mood, normal affect, normal judgement, normal behavior  Skin: Warm, dry, no rashes, no erythema      Focused Right Hip Exam:  No dislocation/deformity  ROM: Decreased internal rotation, Decreased flexion pain  Greater troch:non-tender   SI joint: non-tender  Brian test: negative  Oumar's test:  negative  Abduction: 5/5  AT/GS intact    Tenderness noted mid Right buttock    Back:    No TTP over lumbar spinous processes, paraspinal musculature  Negative SLR     No calf tenderness to palpation bilaterally    LE NV Exam: +2 DP/PT pulses bilaterally  Sensation intact to light touch L2-S1 bilaterally, SPN/DPN/TA motor intact    Hip Imaging:    X-rays of the right hip were obtained on " Right hip and reviewed with the patient. Based on my independent evaluation, the imaging shows No acute fracture or dislocations. Minimal degenerative changes noted.          Scribe Attestation      I,:  Malik Sung am acting as a scribe while in the presence of the attending physician.:       I,:  Diogenes Mcgarry MD personally performed the services described in this documentation    as scribed in my presence.:

## 2024-06-27 DIAGNOSIS — J30.1 SEASONAL ALLERGIC RHINITIS DUE TO POLLEN: ICD-10-CM

## 2024-06-27 DIAGNOSIS — I10 BENIGN ESSENTIAL HYPERTENSION: ICD-10-CM

## 2024-06-27 RX ORDER — LISINOPRIL 10 MG/1
TABLET ORAL
Qty: 90 TABLET | Refills: 1 | Status: SHIPPED | OUTPATIENT
Start: 2024-06-27

## 2024-09-12 ENCOUNTER — APPOINTMENT (OUTPATIENT)
Dept: LAB | Facility: CLINIC | Age: 54
End: 2024-09-12
Payer: COMMERCIAL

## 2024-09-12 DIAGNOSIS — Z12.5 SCREENING FOR PROSTATE CANCER: ICD-10-CM

## 2024-09-12 DIAGNOSIS — E78.00 HYPERCHOLESTEROLEMIA: ICD-10-CM

## 2024-09-12 DIAGNOSIS — I10 BENIGN ESSENTIAL HYPERTENSION: ICD-10-CM

## 2024-09-12 LAB
ALBUMIN SERPL BCG-MCNC: 4.4 G/DL (ref 3.5–5)
ALP SERPL-CCNC: 46 U/L (ref 34–104)
ALT SERPL W P-5'-P-CCNC: 28 U/L (ref 7–52)
ANION GAP SERPL CALCULATED.3IONS-SCNC: 8 MMOL/L (ref 4–13)
AST SERPL W P-5'-P-CCNC: 19 U/L (ref 13–39)
BASOPHILS # BLD AUTO: 0.05 THOUSANDS/ΜL (ref 0–0.1)
BASOPHILS NFR BLD AUTO: 1 % (ref 0–1)
BILIRUB SERPL-MCNC: 0.62 MG/DL (ref 0.2–1)
BUN SERPL-MCNC: 18 MG/DL (ref 5–25)
CALCIUM SERPL-MCNC: 9.1 MG/DL (ref 8.4–10.2)
CHLORIDE SERPL-SCNC: 103 MMOL/L (ref 96–108)
CHOLEST SERPL-MCNC: 146 MG/DL
CO2 SERPL-SCNC: 26 MMOL/L (ref 21–32)
CREAT SERPL-MCNC: 0.95 MG/DL (ref 0.6–1.3)
EOSINOPHIL # BLD AUTO: 0.18 THOUSAND/ΜL (ref 0–0.61)
EOSINOPHIL NFR BLD AUTO: 3 % (ref 0–6)
ERYTHROCYTE [DISTWIDTH] IN BLOOD BY AUTOMATED COUNT: 12.3 % (ref 11.6–15.1)
GFR SERPL CREATININE-BSD FRML MDRD: 89 ML/MIN/1.73SQ M
GLUCOSE P FAST SERPL-MCNC: 89 MG/DL (ref 65–99)
HCT VFR BLD AUTO: 46.3 % (ref 36.5–49.3)
HDLC SERPL-MCNC: 49 MG/DL
HGB BLD-MCNC: 15.5 G/DL (ref 12–17)
IMM GRANULOCYTES # BLD AUTO: 0.01 THOUSAND/UL (ref 0–0.2)
IMM GRANULOCYTES NFR BLD AUTO: 0 % (ref 0–2)
LDLC SERPL CALC-MCNC: 86 MG/DL (ref 0–100)
LYMPHOCYTES # BLD AUTO: 2.08 THOUSANDS/ΜL (ref 0.6–4.47)
LYMPHOCYTES NFR BLD AUTO: 31 % (ref 14–44)
MCH RBC QN AUTO: 31.6 PG (ref 26.8–34.3)
MCHC RBC AUTO-ENTMCNC: 33.5 G/DL (ref 31.4–37.4)
MCV RBC AUTO: 94 FL (ref 82–98)
MONOCYTES # BLD AUTO: 0.66 THOUSAND/ΜL (ref 0.17–1.22)
MONOCYTES NFR BLD AUTO: 10 % (ref 4–12)
NEUTROPHILS # BLD AUTO: 3.79 THOUSANDS/ΜL (ref 1.85–7.62)
NEUTS SEG NFR BLD AUTO: 55 % (ref 43–75)
NONHDLC SERPL-MCNC: 97 MG/DL
NRBC BLD AUTO-RTO: 0 /100 WBCS
PLATELET # BLD AUTO: 201 THOUSANDS/UL (ref 149–390)
PMV BLD AUTO: 10 FL (ref 8.9–12.7)
POTASSIUM SERPL-SCNC: 4 MMOL/L (ref 3.5–5.3)
PROT SERPL-MCNC: 6.9 G/DL (ref 6.4–8.4)
PSA SERPL-MCNC: 0.48 NG/ML (ref 0–4)
RBC # BLD AUTO: 4.91 MILLION/UL (ref 3.88–5.62)
SODIUM SERPL-SCNC: 137 MMOL/L (ref 135–147)
TRIGL SERPL-MCNC: 55 MG/DL
TSH SERPL DL<=0.05 MIU/L-ACNC: 2.94 UIU/ML (ref 0.45–4.5)
WBC # BLD AUTO: 6.77 THOUSAND/UL (ref 4.31–10.16)

## 2024-09-12 PROCEDURE — 84443 ASSAY THYROID STIM HORMONE: CPT

## 2024-09-12 PROCEDURE — 80053 COMPREHEN METABOLIC PANEL: CPT

## 2024-09-12 PROCEDURE — G0103 PSA SCREENING: HCPCS

## 2024-09-12 PROCEDURE — 85025 COMPLETE CBC W/AUTO DIFF WBC: CPT

## 2024-09-12 PROCEDURE — 36415 COLL VENOUS BLD VENIPUNCTURE: CPT

## 2024-09-12 PROCEDURE — 80061 LIPID PANEL: CPT

## 2024-09-30 ENCOUNTER — OFFICE VISIT (OUTPATIENT)
Dept: FAMILY MEDICINE CLINIC | Facility: CLINIC | Age: 54
End: 2024-09-30
Payer: COMMERCIAL

## 2024-09-30 VITALS
TEMPERATURE: 96 F | HEIGHT: 73 IN | DIASTOLIC BLOOD PRESSURE: 78 MMHG | OXYGEN SATURATION: 97 % | SYSTOLIC BLOOD PRESSURE: 124 MMHG | HEART RATE: 90 BPM | WEIGHT: 249.2 LBS | BODY MASS INDEX: 33.03 KG/M2

## 2024-09-30 DIAGNOSIS — Z00.00 ANNUAL PHYSICAL EXAM: Primary | ICD-10-CM

## 2024-09-30 DIAGNOSIS — I82.5Y3 CHRONIC DEEP VEIN THROMBOSIS (DVT) OF PROXIMAL VEIN OF BOTH LOWER EXTREMITIES (HCC): Chronic | ICD-10-CM

## 2024-09-30 DIAGNOSIS — E66.09 CLASS 1 OBESITY DUE TO EXCESS CALORIES WITH SERIOUS COMORBIDITY AND BODY MASS INDEX (BMI) OF 31.0 TO 31.9 IN ADULT: ICD-10-CM

## 2024-09-30 DIAGNOSIS — E66.811 CLASS 1 OBESITY DUE TO EXCESS CALORIES WITH SERIOUS COMORBIDITY AND BODY MASS INDEX (BMI) OF 31.0 TO 31.9 IN ADULT: ICD-10-CM

## 2024-09-30 DIAGNOSIS — I10 BENIGN ESSENTIAL HYPERTENSION: ICD-10-CM

## 2024-09-30 DIAGNOSIS — Z13.31 NEGATIVE DEPRESSION SCREENING: ICD-10-CM

## 2024-09-30 DIAGNOSIS — E78.00 HYPERCHOLESTEROLEMIA: ICD-10-CM

## 2024-09-30 DIAGNOSIS — G89.29 CHRONIC RIGHT SHOULDER PAIN: ICD-10-CM

## 2024-09-30 DIAGNOSIS — M25.511 CHRONIC RIGHT SHOULDER PAIN: ICD-10-CM

## 2024-09-30 PROCEDURE — 99213 OFFICE O/P EST LOW 20 MIN: CPT | Performed by: FAMILY MEDICINE

## 2024-09-30 PROCEDURE — 99396 PREV VISIT EST AGE 40-64: CPT | Performed by: FAMILY MEDICINE

## 2024-09-30 NOTE — PROGRESS NOTES
Assessment/Plan:    No problem-specific Assessment & Plan notes found for this encounter.       Diagnoses and all orders for this visit:    Annual physical exam    Benign essential hypertension  Comments:  no change in the medication    Hypercholesterolemia  Comments:  pt counseled on diet and exercise    Chronic deep vein thrombosis (DVT) of proximal vein of both lower extremities (HCC)  Comments:  chronic stable    Class 1 obesity due to excess calories with serious comorbidity and body mass index (BMI) of 31.0 to 31.9 in adult  Comments:  pt counseled on diet and exercise    Negative depression screening    Chronic right shoulder pain  -     Ambulatory Referral to Physical Therapy; Future          PHQ-2/9 Depression Screening    Little interest or pleasure in doing things: 0 - not at all  Feeling down, depressed, or hopeless: 0 - not at all  PHQ-2 Score: 0  PHQ-2 Interpretation: Negative depression screen            Subjective:      Patient ID: Lucas Gibson is a 54 y.o. male.    Pt here for annual physical, pt complains right shoulder pain      The following portions of the patient's history were reviewed and updated as appropriate: allergies, current medications, past family history, past medical history, past social history, past surgical history, and problem list.    Review of Systems   Constitutional: Negative.  Negative for chills, fatigue and fever.   HENT: Negative.  Negative for hearing loss.    Eyes: Negative.  Negative for visual disturbance.   Respiratory:  Negative for shortness of breath and wheezing.    Cardiovascular:  Negative for chest pain and palpitations.   Gastrointestinal:  Negative for abdominal pain, blood in stool, constipation, diarrhea, nausea and vomiting.   Endocrine: Negative.    Genitourinary:  Negative for difficulty urinating and dysuria.   Musculoskeletal:  Negative for arthralgias and myalgias.   Skin: Negative.    Allergic/Immunologic: Negative.    Neurological:  Negative  "for seizures and syncope.   Hematological:  Negative for adenopathy.   Psychiatric/Behavioral: Negative.           Objective:    /78   Pulse 90   Temp (!) 96 °F (35.6 °C) (Tympanic)   Ht 6' 1\" (1.854 m)   Wt 113 kg (249 lb 3.2 oz)   SpO2 97%   BMI 32.88 kg/m²      Physical Exam  Vitals and nursing note reviewed.   Constitutional:       General: He is not in acute distress.     Appearance: Normal appearance. He is well-developed. He is not ill-appearing, toxic-appearing or diaphoretic.   HENT:      Head: Normocephalic and atraumatic.      Right Ear: Tympanic membrane, ear canal and external ear normal. There is no impacted cerumen.      Left Ear: Tympanic membrane, ear canal and external ear normal. There is no impacted cerumen.      Nose: Nose normal. No congestion or rhinorrhea.      Mouth/Throat:      Mouth: Mucous membranes are moist.      Pharynx: Oropharynx is clear. No oropharyngeal exudate or posterior oropharyngeal erythema.   Eyes:      General: No scleral icterus.        Right eye: No discharge.         Left eye: No discharge.      Extraocular Movements: Extraocular movements intact.      Conjunctiva/sclera: Conjunctivae normal.      Pupils: Pupils are equal, round, and reactive to light.   Cardiovascular:      Rate and Rhythm: Normal rate and regular rhythm.      Pulses: Normal pulses.      Heart sounds: Normal heart sounds. No murmur heard.     No friction rub. No gallop.   Pulmonary:      Effort: Pulmonary effort is normal. No respiratory distress.      Breath sounds: Normal breath sounds. No wheezing, rhonchi or rales.   Abdominal:      General: Bowel sounds are normal. There is no distension.      Palpations: Abdomen is soft. There is no mass.      Tenderness: There is no abdominal tenderness. There is no guarding or rebound.   Musculoskeletal:         General: No swelling or deformity. Normal range of motion.      Cervical back: Normal range of motion and neck supple. No rigidity.      " Right lower leg: Edema present.      Left lower leg: Edema present.   Lymphadenopathy:      Cervical: No cervical adenopathy.   Skin:     General: Skin is warm and dry.      Findings: No rash.   Neurological:      General: No focal deficit present.      Mental Status: He is alert and oriented to person, place, and time.      Sensory: No sensory deficit.      Motor: No weakness.      Coordination: Coordination normal.      Gait: Gait normal.      Deep Tendon Reflexes: Reflexes are normal and symmetric. Reflexes normal.   Psychiatric:         Mood and Affect: Mood normal.         Behavior: Behavior normal.         Thought Content: Thought content normal.         Judgment: Judgment normal.

## 2024-12-18 ENCOUNTER — OFFICE VISIT (OUTPATIENT)
Dept: URGENT CARE | Facility: CLINIC | Age: 54
End: 2024-12-18
Payer: COMMERCIAL

## 2024-12-18 ENCOUNTER — APPOINTMENT (OUTPATIENT)
Dept: RADIOLOGY | Facility: CLINIC | Age: 54
End: 2024-12-18
Payer: COMMERCIAL

## 2024-12-18 VITALS
DIASTOLIC BLOOD PRESSURE: 88 MMHG | SYSTOLIC BLOOD PRESSURE: 128 MMHG | OXYGEN SATURATION: 97 % | TEMPERATURE: 97.9 F | HEART RATE: 110 BPM | RESPIRATION RATE: 18 BRPM

## 2024-12-18 DIAGNOSIS — M54.50 MIDLINE LOW BACK PAIN WITHOUT SCIATICA, UNSPECIFIED CHRONICITY: ICD-10-CM

## 2024-12-18 DIAGNOSIS — M54.50 MIDLINE LOW BACK PAIN WITHOUT SCIATICA, UNSPECIFIED CHRONICITY: Primary | ICD-10-CM

## 2024-12-18 PROCEDURE — G0383 LEV 4 HOSP TYPE B ED VISIT: HCPCS | Performed by: PHYSICIAN ASSISTANT

## 2024-12-18 PROCEDURE — 72110 X-RAY EXAM L-2 SPINE 4/>VWS: CPT

## 2024-12-18 RX ORDER — METHYLPREDNISOLONE 4 MG/1
TABLET ORAL
Qty: 21 TABLET | Refills: 0 | Status: SHIPPED | OUTPATIENT
Start: 2024-12-18 | End: 2024-12-18

## 2024-12-18 RX ORDER — METHYLPREDNISOLONE 4 MG/1
TABLET ORAL
Qty: 21 EACH | Refills: 0 | Status: SHIPPED | OUTPATIENT
Start: 2024-12-18

## 2024-12-18 RX ORDER — CYCLOBENZAPRINE HCL 10 MG
10 TABLET ORAL
Qty: 14 TABLET | Refills: 0 | Status: SHIPPED | OUTPATIENT
Start: 2024-12-18 | End: 2025-01-01

## 2024-12-18 NOTE — PATIENT INSTRUCTIONS
Discussed my interpretation of x-ray which is no acute osseous abnormalities, official read is pending and will contact if read is any different than my own.  Provided referral to physical therapy.  Recommend over-the-counter Tylenol.  Can take Flexeril as instructed at bedtime, did discuss potential side effects.

## 2024-12-18 NOTE — PROGRESS NOTES
[facility]        NAME: Lucas Gibson is a 54 y.o. male  : 1970    MRN: [id number]  DATE: 2024  TIME: 9:03 AM    Assessment and Plan   Midline low back pain without sciatica, unspecified chronicity [M54.50]  1. Midline low back pain without sciatica, unspecified chronicity  Ambulatory Referral to Physical Therapy    cyclobenzaprine (FLEXERIL) 10 mg tablet    DISCONTINUED: methylPREDNISolone 4 MG tablet therapy pack    CANCELED: XR spine lumbar 2 or 3 views injury            Patient Instructions     Patient Instructions   Discussed my interpretation of x-ray which is no acute osseous abnormalities, official read is pending and will contact if read is any different than my own.  Provided referral to physical therapy.  Recommend over-the-counter Tylenol.  Can take Flexeril as instructed at bedtime, did discuss potential side effects.      Follow up with PCP in 3-5 days.  Proceed to  ER if symptoms worsen.    Chief Complaint     Chief Complaint   Patient presents with    Back Pain     C/O lower back pain for 2 days. Was helping a neighbor cut a tree         History of Present Illness       Patient is a 54-year-old male presenting today with low back pain x 2 days.  Patient notes he has been dealing with low back pain on and off for the last few months, notes he was helping a friend move a cut down tree a couple days ago which seem to aggravate his low back, is experiencing pain and discomfort on both sides, has not taken any medication limiting factors.  Denies numbness, tingling, bruising, swelling, saddle anesthesia, urinary or bowel incontinence.        Review of Systems   Review of Systems   Constitutional:  Negative for chills and fever.   HENT:  Negative for ear pain and sore throat.    Eyes:  Negative for pain and visual disturbance.   Respiratory:  Negative for cough and shortness of breath.    Cardiovascular:  Negative for chest pain and palpitations.   Gastrointestinal:   Negative for abdominal pain and vomiting.   Genitourinary:  Negative for dysuria and hematuria.   Musculoskeletal:  Positive for back pain.   Skin:  Negative for color change and rash.   Neurological:  Negative for seizures and syncope.   All other systems reviewed and are negative.        Current Medications       Current Outpatient Medications:     cyclobenzaprine (FLEXERIL) 10 mg tablet, Take 1 tablet (10 mg total) by mouth daily at bedtime for 14 days, Disp: 14 tablet, Rfl: 0    fluticasone (FLONASE) 50 mcg/act nasal spray, 2 sprays into each nostril daily, Disp: 1 g, Rfl: 0    lisinopril (ZESTRIL) 10 mg tablet, Take 1 tablet by mouth once daily, Disp: 90 tablet, Rfl: 1    loratadine (CLARITIN) 10 mg tablet, Take 1 tablet (10 mg total) by mouth daily, Disp: 30 tablet, Rfl: 0    Omega-3 1000 MG CAPS, Take by mouth in the morning, Disp: , Rfl:     Red Yeast Rice 600 MG TABS, Take by mouth in the morning, Disp: , Rfl:     rivaroxaban (Xarelto) 10 mg tablet, Take 1 tablet by mouth once daily, Disp: 90 tablet, Rfl: 3    sildenafil (VIAGRA) 25 MG tablet, 1 to 2 tablets p.o. daily as needed, Disp: 30 tablet, Rfl: 1    apixaban (Eliquis) 2.5 mg, Take by mouth (Patient not taking: Reported on 12/18/2024), Disp: , Rfl:     Current Allergies     Allergies as of 12/18/2024 - Reviewed 12/18/2024   Allergen Reaction Noted    Contrast dye [iodinated contrast media] Throat Swelling 08/10/2023    Penicillins  05/30/2019            The following portions of the patient's history were reviewed and updated as appropriate: allergies, current medications, past family history, past medical history, past social history, past surgical history and problem list.     Past Medical History:   Diagnosis Date    Allergic     BPH with obstruction/lower urinary tract symptoms     COVID-19 01/2021    Deep vein thrombosis (DVT) (HCC)     Erectile dysfunction due to arterial insufficiency     Hypertension     Mesenteric panniculitis (HCC)  08/04/2021    show on CT 1/2021. was advised to have repeat in 6-12 months. Has been ordered already pending. Patient needs to have scheduled    No known health problems     history of denial of any significant medical , no pertinent past medical history per allscripts    Testicular hypogonadism     Varicocele        Past Surgical History:   Procedure Laterality Date    EYE SURGERY      INGUINAL HERNIA REPAIR      UMBILICAL HERNIA REPAIR      per allscripts    VASECTOMY      VEIN LIGATION AND STRIPPING         Family History   Problem Relation Age of Onset    Heart disease Mother     Heart attack Mother     Breast cancer Mother     Hypertension Mother     Cerebral aneurysm Mother     Prostate cancer Father     Bone cancer Father     Diabetes type II Father     Testicular cancer Cousin     Coronary artery disease Maternal Grandmother     Colon cancer Paternal Grandmother     No Known Problems Son     No Known Problems Son          Medications have been verified.        Objective   /88   Pulse (!) 110   Temp 97.9 °F (36.6 °C)   Resp 18   SpO2 97%        Physical Exam     Physical Exam  Vitals reviewed.   Constitutional:       General: He is not in acute distress.  HENT:      Head: Normocephalic.   Cardiovascular:      Rate and Rhythm: Normal rate.      Pulses: Normal pulses.   Pulmonary:      Effort: Pulmonary effort is normal.   Musculoskeletal:      Comments: No obvious deformity of low back, no bruising, no swelling, no spinous process tenderness, mild TTP along right and left lumbar paravertebral muscles into sciatic region bilaterally, no spasms elicited, full ROM of lower extremities bilaterally, normal strength of lower extremities bilaterally, gross sensation of her lower extremities intact.   Skin:     General: Skin is warm.      Capillary Refill: Capillary refill takes less than 2 seconds.   Neurological:      General: No focal deficit present.      Mental Status: He is alert.

## 2024-12-20 DIAGNOSIS — J30.1 SEASONAL ALLERGIC RHINITIS DUE TO POLLEN: ICD-10-CM

## 2024-12-20 DIAGNOSIS — I10 BENIGN ESSENTIAL HYPERTENSION: ICD-10-CM

## 2024-12-20 RX ORDER — LISINOPRIL 10 MG/1
10 TABLET ORAL DAILY
Qty: 90 TABLET | Refills: 1 | Status: SHIPPED | OUTPATIENT
Start: 2024-12-20

## 2024-12-31 NOTE — PROGRESS NOTES
PT Evaluation     Today's date: 2025  Patient name: Lucas Gibson  : 1970  MRN: [id number]  Referring provider: Fernando Pinto PA-C  Dx:   Encounter Diagnosis     ICD-10-CM    1. Midline low back pain without sciatica, unspecified chronicity  M54.50 Ambulatory Referral to Physical Therapy          Start Time: 705  Stop Time: 745  Total time in clinic (min): 40 minutes    Assessment  Impairments: abnormal or restricted ROM, activity intolerance, impaired physical strength, pain with function, participation limitations, activity limitations and endurance  Symptom irritability: moderate    Assessment details: Lucas is a 54 year old patient presenting to OPPT for evaluation regarding lumbar pain and discomfort. Upon evaluation they show impairments in the following areas: decreased core, proximal LE strength evident. Increased L sided lower back discomfort with R sided strength testing. All symptoms able to be abolished with prone press up activity - indicative of derangement classification. Further reinforced postural re-education with seated postural correction in addition to postural muscular strength and endurance. These impairments limit their ability to perform daily activities as well as their previous level of function causing decreased quality of life. Patient demonstrated and verbalized good understanding of HEP and POC going forward. Session emphasis continued with extension based movements to reduce symptomology, coupled with trunk/core strengthening/endurance with preparation for return to gym activities.     Patient requires continued skilled PT services in order to improve aforementioned impairments so that they are able to return to highest level of function possible. Without initiation of skilled PT services, patient will continue to have lumbar pain limiting his ability to perform daily activities as desired, limiting his quality of life. .     Understanding of Dx/Px/POC: good      Prognosis: good    Goals  Short-Term Goals (4 weeks)   1.  Patient will decrease worst rating of pain by 2/10 to improve quality of life   2. Pt will increase strength by 0.5/5 to improve quality of life with improved efficiency of transfers and daily activities  3. Patient will be able to perform home and household duties with 2/10 reduction in pain indicating improved QOL   4. Patient will improve L/S AROM by 25% indicating improved mobility of affected area       Long-Term Goals (8 weeks)   1. Patient's L/S will be WFL indicating improvement in L/S AROM capacities   2. Patient will decrease pain by 4/10 at worst in comparison to IE indicating significant reduction in pain and improved quality of life   3. Patient will be able to increase maximal walking distance by 1 mile  indicating improved respiratory and musculoskeletal function   4. Patient will be able to perform household and hobby activities without increase in pain > or equal to 2/10 indicating ability to independently manage pain symptoms to accomplish daily activities.   5. Patient will be independent with HEP with good form accomplished     Plan  Patient would benefit from: skilled physical therapy  Planned modality interventions: cryotherapy and thermotherapy: hydrocollator packs    Planned therapy interventions: manual therapy, nerve gliding, neuromuscular re-education, self care, strengthening, stretching, therapeutic activities, therapeutic exercise, home exercise program, gait training, flexibility and balance    Frequency: 2x week  Plan of Care beginning date: 12/31/2024  Plan of Care expiration date: 3/1/2025  Treatment plan discussed with: patient        Subjective Evaluation    History of Present Illness  Mechanism of injury: Lucas is a 54 year old patient presenting to OPPT for evaluation regarding LBP. Has a back brace, uses it to lift things that are heavy. Generally, has been more so uncomfortable. Happened while he was at work when he  "bent over to  a bag of chips. Primarily describes it as a uncomfortable, achey feeling. Has been going on/off for > 3 months. Insidious onset. Recently put on steroid pack and muscle relaxers.     He works nights, on [employer] - it was the best that it has felt recently. 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 A few weeks ago/month - he bent over to  a table and that set the symptoms off. Likes to do outdoors things - cutting wood.     Per EMR: \"Patient is a 54-year-old male presenting today with low back pain x 2 days.  Patient notes he has been dealing with low back pain on and off for the last few months, notes he was helping a friend move a cut down tree a couple days ago which seem to aggravate his low back, is experiencing pain and discomfort on both sides, has not taken any medication limiting factors.  Denies numbness, tingling, bruising, swelling, saddle anesthesia, urinary or bowel incontinence.\"   Pain  Current pain ratin  At best pain ratin  At worst pain ratin  Location: Lower back  Quality: dull ache  Relieving factors: heat (helps in the shower. TENs units sometimes)  Exacerbated by: Bending over and lifting object.  Progression: improved          Objective    Postural Observation   Sitting: lordotic/neutral/kyphotic  Standing: lordotic/neutral/kyphotic  Postural Change: improved, abolished  Lateral Shift: right/left/nil  Shift Relevant: yes/no    Lower Quarter Screen:  Myotomes  Right Hip Flexion (L1-3): 4 (pain L paraspinal)   Left Hip Flexion (L1-3): 4  R Knee Ext (L3-4): 4+ (pain L paraspinal)  L Knee Ext (L3-4): 4+  R DF (L4): 4+  L DF (L4): 4+  R EDL (L5): nt   L EDL (L5): nt   R PF (S1): 5  L PF (S1): 5  R Knee Flex (S1-2): 5   L Knee Flex (S1-2): 5    Dermatomes  Sensation intact bilaterally    Reflexes  R Patellar Reflex: 1+  L Patellar Reflex: 2+  R Achilles " Reflex: 1+  L Achilles Reflex: 2+     Neurodynamic Testing  Slump Test: (-)   Passive SLR: (-)    Femoral Nerve Traction Test: (-)     Lumbar Active Range of Motion (pre 1/10 in LB)   Movement Loss Symptoms Darwin Mod Min Nil Symptoms   Flexion   XXX  Increase  Worse    Extension  (EIS)     XXX Abolish  Centralised   R SG    XXX Not tested      L SG    XXX   Not tested      Other            Hip Clinical Tests:   VENUS: -  ALEXIA: (-)   Richelle: (-)   Modified Hubert: (-)   Hamstring 90/90: (-)    Palpation: mild TTP L paraspinal, none otherwise      Gait: wnl    Squat/STS: reduced depth, increased discomfort with squat       SIJ Special Tests:  Compression= (-), Gapping= (-), FABERS= (-), Gaenslan's= (-), Sacral Thrust/PA Pressure= (-), Post Thigh Thrust= (-)     Precautions: LBP, Hx DVT       Manuals             L/S STM              L/S P-A mobs                                        Neuro Re-Ed             Repeated motions              EIS              PPU  X10             PPU sag              EIS dowel              Posture re-education                           Ther Ex             Row  Blue TB 3x10            LPD  Blue TB 3x10            Paloff Press  Blue TB 3x10            Trunk rotation                                                                  Ther Activity             Suitcase carry              DL / lifting mechanics              Gait Training                                       Modalities                                          Access Code: [email or web address]  URL: [email or web address]  Date: 01/02/2025  Prepared by: Ravi Johnson    Exercises  - Standing Anti-Rotation Press with Anchored Resistance  - 1 x daily - 7 x weekly - 3 sets - 10 reps  - Shoulder extension with resistance - Neutral  - 1 x daily - 7 x weekly - 3 sets - 10 reps  - Standing Shoulder Row with Anchored Resistance  - 1 x daily - 7 x weekly - 3 sets - 10 reps  - Prone Press Up  - 1 x daily - 7 x weekly - 3 sets - 10 reps  -  Prone Press Up On Elbows  - 1 x daily - 7 x weekly - 3 sets - 10 reps  - Standing Lumbar Extension  - 1 x daily - 7 x weekly - 3 sets - 10 reps

## 2025-01-02 ENCOUNTER — EVALUATION (OUTPATIENT)
Dept: PHYSICAL THERAPY | Facility: CLINIC | Age: 55
End: 2025-01-02
Payer: COMMERCIAL

## 2025-01-02 DIAGNOSIS — M54.50 MIDLINE LOW BACK PAIN WITHOUT SCIATICA, UNSPECIFIED CHRONICITY: ICD-10-CM

## 2025-01-02 PROCEDURE — 97110 THERAPEUTIC EXERCISES: CPT

## 2025-01-02 PROCEDURE — 97162 PT EVAL MOD COMPLEX 30 MIN: CPT

## 2025-01-03 DIAGNOSIS — Z86.718 HISTORY OF DVT (DEEP VEIN THROMBOSIS): ICD-10-CM

## 2025-01-03 RX ORDER — RIVAROXABAN 10 MG/1
10 TABLET, FILM COATED ORAL DAILY
Qty: 90 TABLET | Refills: 1 | Status: SHIPPED | OUTPATIENT
Start: 2025-01-03

## 2025-01-05 NOTE — PROGRESS NOTES
"Daily Note     Today's date: 2025  Patient name: Lucas Gibson  : 1970  MRN: [id number]  Referring provider: Fernando Pinto PA-C  Dx:   Encounter Diagnosis     ICD-10-CM    1. Midline low back pain without sciatica, unspecified chronicity  M54.50                      Subjective: The patient states that he was moving furniture yesterday.  His back is feeling stiff today, not really painful.        Objective: See treatment diary below      Assessment: Initiated TE as outlined below in daily treatment diary.  Tolerated treatment well with no increase in pain noted during session.  Muscle tightness and decreased mobility noted along lumbar spine with manual therapy.  He had good tolerance to all interventions today, reports feeling looser at end of session.  Patient demonstrated fatigue post treatment and would benefit from continued PT to improve function.        Plan: Continue per plan of care.   Progress as able in upcoming visits.       Precautions: LBP, Hx DVT       Manuals 1/2 /           L/S STM   ML           L/S P-A mobs   Grade II-III ML                                     Neuro Re-Ed             Repeated motions              EIS              PPU  X10  2x10           PPU sag              EIS dowel              PPT  3\"x10           PPT w/march             PPT w/SLR  10x William           PPT w/BKFO             PPT w/heel slides             Posture re-education                           Ther Ex             Row  Blue TB 3x10 Blue TB 3\" 3x10           LPD  Blue TB 3x10 Blue TB 3\" 3x10           Paloff Press  Blue TB 3x10 Blue TB 5-10\"  10x William           Trunk rotation              NuStep   L6 10'           Bridges  3\"x10           S/L Hip Abd             Clamshells                                        Ther Activity             Suitcase carry              DL / lifting mechanics              Gait Training                                       Modalities                                            "

## 2025-01-06 ENCOUNTER — OFFICE VISIT (OUTPATIENT)
Dept: PHYSICAL THERAPY | Facility: CLINIC | Age: 55
End: 2025-01-06
Payer: COMMERCIAL

## 2025-01-06 DIAGNOSIS — M54.50 MIDLINE LOW BACK PAIN WITHOUT SCIATICA, UNSPECIFIED CHRONICITY: Primary | ICD-10-CM

## 2025-01-06 PROCEDURE — 97112 NEUROMUSCULAR REEDUCATION: CPT | Performed by: PHYSICAL THERAPIST

## 2025-01-06 PROCEDURE — 97140 MANUAL THERAPY 1/> REGIONS: CPT | Performed by: PHYSICAL THERAPIST

## 2025-01-06 PROCEDURE — 97110 THERAPEUTIC EXERCISES: CPT | Performed by: PHYSICAL THERAPIST

## 2025-01-09 ENCOUNTER — OFFICE VISIT (OUTPATIENT)
Dept: PHYSICAL THERAPY | Facility: CLINIC | Age: 55
End: 2025-01-09
Payer: COMMERCIAL

## 2025-01-09 DIAGNOSIS — M54.50 MIDLINE LOW BACK PAIN WITHOUT SCIATICA, UNSPECIFIED CHRONICITY: Primary | ICD-10-CM

## 2025-01-09 PROCEDURE — 97110 THERAPEUTIC EXERCISES: CPT

## 2025-01-09 PROCEDURE — 97112 NEUROMUSCULAR REEDUCATION: CPT

## 2025-01-09 PROCEDURE — 97140 MANUAL THERAPY 1/> REGIONS: CPT

## 2025-01-09 NOTE — PROGRESS NOTES
"Daily Note     Today's date: 2025  Patient name: Lucas Gibson  : 1970  MRN: [id number]  Referring provider: Fernando Pinto PA-C  Dx:   Encounter Diagnosis     ICD-10-CM    1. Midline low back pain without sciatica, unspecified chronicity  M54.50                      Subjective: My LB is feeling pretty good today and yesterday  I was able to tie my shoes today which is a good day  I was sore after last PT session but a good sore  Bending fwd is where I feel the weakness / tightness  in LB  denies mm spasms  currently no f/u apt scheduled as of yet      Objective: See treatment diary below      Assessment: Tolerated treatment well. Progressed POC with > reps/resistance as chet  Pt indicated > mm fatigue, stated in a \"good way\"  Pt trial IASTM with pt demon L>R mm tightness Lspine, demon LTP L L4-L5 region  Pt reports > mm soreness end rx session with review self stretches  Pt encourage hydration and educ potential bruising/mm soreness  Patient would benefit from continued PT      Plan: Continue per plan of care.      Insurance:  [employer]    Precautions: LBP, Hx DVT     Re-eval Date: 25    Date        Visit Count   3     FOTO        Pain In   0/10 LBP  Min tightness      Pain Out            Manuals 1/2 1/6      L/S STM   ML IAST/STM to LB Lspine/PSIS     L/S P-A mobs   Grade II-III ML                      Neuro Re-Ed        Repeated motions         EIS    @ wall  3x30\"     PPU  X10  2x10 10x 5\"  Cues breath    NATE 1 min     PPU sag         EIS dowel         PPT  3\"x10 Review  DC HEP      PPT w/march        PPT w/SLR  10x William 10x BL     PPT w/BKFO        PPT w/heel slides        Posture re-education                 Ther Ex        Row  Blue TB 3x10 Blue TB 3\" 3x10 G[place]  20# 3x10, 3\"  W/ TA     LPD  Blue TB 3x10 Blue TB 3\" 3x10 Gina  20# 3x10  W/ TA     Paloff Press  Blue TB 3x10 Blue TB 5-10\"  10x William Gina  10#  15x 5\"     Trunk rotation    Elmer       NuStep   L6 10' L6 10 min     Bridges  " "3\"x10 10x 5\"     S/L Hip Abd        Clamshells                         Ther Activity        Suitcase carry         DL / lifting mechanics         Gait Training                        Modalities                               "

## 2025-01-13 ENCOUNTER — APPOINTMENT (OUTPATIENT)
Dept: PHYSICAL THERAPY | Facility: CLINIC | Age: 55
End: 2025-01-13
Payer: COMMERCIAL

## 2025-01-15 ENCOUNTER — APPOINTMENT (OUTPATIENT)
Dept: PHYSICAL THERAPY | Facility: CLINIC | Age: 55
End: 2025-01-15
Payer: COMMERCIAL

## 2025-01-15 NOTE — PROGRESS NOTES
"Daily Note     Today's date: 2025  Patient name: Lucas Gibson  : 1970  MRN: [id number]  Referring provider: Fernando Pinto PA-C  Dx:   Encounter Diagnosis     ICD-10-CM    1. Midline low back pain without sciatica, unspecified chronicity  M54.50                      Subjective: The patient states that he is feeling good today, no pain at start of session.  Reports that he was sore after his last session for a day or so but then started to feel better.  States that he has been doing his HEP, feels good when doing extensions.          Objective: See treatment diary below      Assessment: Tolerated treatment well with no increase in pain noted during session.  Soft tissue restrictions noted along L lumbar paraspinals with IASTM but improved tissue quality noted after.  He is progressing nicely with treatment plan with regards to pain, strength and flexibility.  Patient demonstrated fatigue post treatment and would benefit from continued PT      Plan: Continue per plan of care.   Continue to progress as able in upcoming visits.       Insurance:  [employer]    Precautions: LBP, Hx DVT     Re-eval Date: 25    Date       Visit Count   3 4    FOTO        Pain In   0/10 LBP  Min tightness  0/10    Pain Out    0/10        Manuals     L/S STM   ML IAST/STM to LB Lspine/PSIS IASTM/STM to LB, L/S & PSIS    L/S P-A mobs   Grade II-III ML  Grade II-III ML                    Neuro Re-Ed        Repeated motions         EIS    @ wall  3x30\" Reviewed HEP    PPU  X10  2x10 10x 5\"  Cues breath    NATE 1 min 5\"x10  Cues for breath at top        PPU sag         EIS dowel         PPT  3\"x10 Review  DC HEP      PPT w/march        PPT w/SLR  10x William 10x BL William 15x     PPT w/BKFO        PPT w/heel slides        Posture re-education                 Ther Ex        Row  Blue TB 3x10 Blue TB 3\" 3x10 [place]  20# 3x10, 3\"  W/ TA Gina 20#  3\" 3x10  With TA    LPD  Blue TB 3x10 Blue TB 3\" 3x10 [place]  20# " "3x10  W/ TA Gina 20#  3\" 3x10  With TA    Paloff Press  Blue TB 3x10 Blue TB 5-10\"  10x William Gina  10#  15x 5\" Gina 10#   5\"x15 William    Trunk rotation    Gina       NuStep   L6 10' L6 10 min L6 10 min    Bridges  3\"x10 10x 5\" 5\"x15    S/L Hip Abd        Clamshells                         Ther Activity        Suitcase carry         DL / lifting mechanics         Gait Training                        Modalities                                 "

## 2025-01-16 ENCOUNTER — OFFICE VISIT (OUTPATIENT)
Dept: PHYSICAL THERAPY | Facility: CLINIC | Age: 55
End: 2025-01-16
Payer: COMMERCIAL

## 2025-01-16 DIAGNOSIS — M54.50 MIDLINE LOW BACK PAIN WITHOUT SCIATICA, UNSPECIFIED CHRONICITY: Primary | ICD-10-CM

## 2025-01-16 PROCEDURE — 97112 NEUROMUSCULAR REEDUCATION: CPT | Performed by: PHYSICAL THERAPIST

## 2025-01-16 PROCEDURE — 97110 THERAPEUTIC EXERCISES: CPT | Performed by: PHYSICAL THERAPIST

## 2025-01-16 PROCEDURE — 97140 MANUAL THERAPY 1/> REGIONS: CPT | Performed by: PHYSICAL THERAPIST

## 2025-01-20 ENCOUNTER — APPOINTMENT (OUTPATIENT)
Dept: PHYSICAL THERAPY | Facility: CLINIC | Age: 55
End: 2025-01-20
Payer: COMMERCIAL

## 2025-01-22 ENCOUNTER — OFFICE VISIT (OUTPATIENT)
Dept: URGENT CARE | Facility: CLINIC | Age: 55
End: 2025-01-22
Payer: COMMERCIAL

## 2025-01-22 ENCOUNTER — OFFICE VISIT (OUTPATIENT)
Dept: PHYSICAL THERAPY | Facility: CLINIC | Age: 55
End: 2025-01-22
Payer: COMMERCIAL

## 2025-01-22 VITALS
HEART RATE: 102 BPM | TEMPERATURE: 97.7 F | RESPIRATION RATE: 18 BRPM | DIASTOLIC BLOOD PRESSURE: 85 MMHG | OXYGEN SATURATION: 95 % | SYSTOLIC BLOOD PRESSURE: 139 MMHG

## 2025-01-22 DIAGNOSIS — B96.89 ACUTE BACTERIAL SINUSITIS: Primary | ICD-10-CM

## 2025-01-22 DIAGNOSIS — M54.50 MIDLINE LOW BACK PAIN WITHOUT SCIATICA, UNSPECIFIED CHRONICITY: Primary | ICD-10-CM

## 2025-01-22 DIAGNOSIS — J01.90 ACUTE BACTERIAL SINUSITIS: Primary | ICD-10-CM

## 2025-01-22 PROCEDURE — 97112 NEUROMUSCULAR REEDUCATION: CPT

## 2025-01-22 PROCEDURE — G0382 LEV 3 HOSP TYPE B ED VISIT: HCPCS

## 2025-01-22 PROCEDURE — 97110 THERAPEUTIC EXERCISES: CPT

## 2025-01-22 PROCEDURE — 97530 THERAPEUTIC ACTIVITIES: CPT

## 2025-01-22 RX ORDER — DOXYCYCLINE 100 MG/1
100 CAPSULE ORAL EVERY 12 HOURS SCHEDULED
Qty: 14 CAPSULE | Refills: 0 | Status: SHIPPED | OUTPATIENT
Start: 2025-01-22 | End: 2025-01-29

## 2025-01-22 NOTE — PATIENT INSTRUCTIONS
Please take Doxycyline twice daily for the next 7 days. May cause photosensitivity. Please wear sunscreen and.or protective clothing.     While sick, make sure you get lots of rest and increase your fluid intake.   You may use OTC nasal saline spray, Flonase, and Mucinex for mild congestion.   Take Tylenol or Ibuprofen for fever, mild pain, and/or body aches.  Perform salt water gargles, drink warm tea with honey, and use throat lozenges and Chloraseptic spray for sore throat.    You can also apply warm compresses over your sinuses for any sinus pressure.     While you are sick, taking vitamins may help boost your immune system and potentially aid in recovery by supporting your body's natural defense mechanisms: Vitamin D3 2000 IU daily, Vitamin C 1000mg daily , and Multivitamin daily.    Antibiotics are medicines that treat bacterial infections by either killing bacteria or preventing them from growing. It is important to take the full course of your antibiotics as prescribed to kill the bacteria causing your infection. Stopping your antibiotics early could lead to reinfection and can also promote the spread of antibiotic resistant bacteria. Some antibiotics may cause certain side effects including: nausea, vomiting, diarrhea, bloating, indigestion, belly pain, and/or loss of appetite. Eating yogurt with live and active cultures and/or taking a probiotic and maintaining a healthy diet can help to reduce some of these side effects.     If your symptoms do not improve with our current treatment plan or worsen, please schedule an appointment with your PCP or proceed to the ER.

## 2025-01-22 NOTE — PROGRESS NOTES
Daily Note     Today's date: 2025  Patient name: Lucas Gibson  : 1970  MRN: [id number]  Referring provider: Fernando Pinto PA-C  Dx:   Encounter Diagnosis     ICD-10-CM    1. Midline low back pain without sciatica, unspecified chronicity  M54.50           Start Time: 0705  Stop Time: 0815  Total time in clinic (min): 70 minutes    Subjective: reports that he was feeling overall pretty well. The past two days notes that he is feeling more stiffness - had to do a lot of shoveling over the past couple days. Reporting 65% improvement since starting therapy in overall symptoms - main goal is to try and get rid of symptoms/tightness entirely.       Objective: See treatment diary below      Assessment: Tolerated treatment well. Continue with emphasis on TrA strength, extension based activity. Added some rotation components for further t/s rotation mobility work with intention for morning routine prior to getting out of bed with goal for reduced stiffness in the morning. Reporting reduced stiffness end of session, repeated extension in standing with dowel improved the most with these symptoms. Worked on some lifting mechanics with box loaded up to 35# - trialed both squatting and hip hinge technique - was more efficient and less LB 'fatigue/work' with hip hinge effort. Would have further trials to work on motor control of hip hinge such as dowel work potentially next visit. Patient demonstrated fatigue post treatment, exhibited good technique with therapeutic exercises, and would benefit from continued PT      Plan: Continue per plan of care.  Progress treatment as tolerated.  Plan is to finish out appointments next week, check in/progress note and decide further plan/care going forward.      Insurance:  [employer]    Precautions: LBP, Hx DVT     Re-eval Date: 25    HEP access code: [email or web address]    Date      Visit Count   3 4 5   FOTO        Pain In   0/10 LBP  Min tightness  0/10    Pain Out     "0/10        Manuals 1/2 1/6 1/16 1/22   L/S STM   ML IAST/STM to LB Lspine/PSIS IASTM/STM to LB, L/S & PSIS    L/S P-A mobs   Grade II-III ML  Grade II-III ML                    Neuro Re-Ed        Repeated motions         EIS    @ wall  3x30\" Reviewed HEP    PPU  X10  2x10 10x 5\"  Cues breath    NATE 1 min 5\"x10  Cues for breath at top     X20 5\" hold   PPU sag         EIS dowel      20x    PPT  3\"x10 Review  DC HEP      PPT w/march        PPT w/SLR  10x William 10x BL William 15x     PPT w/BKFO        PPT w/heel slides        Posture re-education         Bird Dog      x5 ea                            Ther Ex        Row  Blue TB 3x10 Blue TB 3\" 3x10 Gina  20# 3x10, 3\"  W/ TA Gina 20#  3\" 3x10  With TA K 20# 2x10    LPD  Blue TB 3x10 Blue TB 3\" 3x10 [place]  20# 3x10  W/ TA Gina 20#  3\" 3x10  With TA    Paloff Press  Blue TB 3x10 Blue TB 5-10\"  10x William Gina  10#  15x 5\" [place] 10#   5\"x15 William K 10# x10 B    Trunk rotation    Gina    K 5# x10 B    NuStep   L6 10' L6 10 min L6 10 min L6 x10 min  for endurance, trunk rotation   Bridges  3\"x10 10x 5\" 5\"x15 5\" x20    S/L Hip Abd        Clamshells         Open book      X20 ea  (HEP)    Cat cow      X20 ea  (HEP)   LTR      X20 ea (HEP)    Leg Press                 Ther Activity        Suitcase carry      #7 2x laps (full [place]) ea side    DL / lifting mechanics         Gait Training                        Modalities                                   "

## 2025-01-22 NOTE — PROGRESS NOTES
[facility]        NAME: Lucas Gibson is a 54 y.o. male  : 1970    MRN: [id number]  DATE: 2025  TIME: 9:06 AM    Assessment and Plan   Acute bacterial sinusitis [J01.90, B96.89]  1. Acute bacterial sinusitis  doxycycline hyclate (VIBRAMYCIN) 100 mg capsule        Given length and persistence of symptoms, suspect acute bacterial sinusitis.  Patient has an amoxicillin allergy.  Will treat with doxycycline empirically.  Recommend supportive care at home  With close PCP follow-up for no improvement or worsening of symptoms.  Patient educated about red flag symptoms and when to proceed to the ED.  Patient instructed to avoid Sudafed and dextromethorphan containing products to avoid high blood pressure spikes.  Patient agreeable and understands the current treatment plan.    Patient Instructions     Patient Instructions   Please take Doxycyline twice daily for the next 7 days. May cause photosensitivity. Please wear sunscreen and.or protective clothing.     While sick, make sure you get lots of rest and increase your fluid intake.   You may use OTC nasal saline spray, Flonase, and Mucinex for mild congestion.   Take Tylenol or Ibuprofen for fever, mild pain, and/or body aches.  Perform salt water gargles, drink warm tea with honey, and use throat lozenges and Chloraseptic spray for sore throat.    You can also apply warm compresses over your sinuses for any sinus pressure.     While you are sick, taking vitamins may help boost your immune system and potentially aid in recovery by supporting your body's natural defense mechanisms: Vitamin D3 2000 IU daily, Vitamin C 1000mg daily , and Multivitamin daily.    Antibiotics are medicines that treat bacterial infections by either killing bacteria or preventing them from growing. It is important to take the full course of your antibiotics as prescribed to kill the bacteria causing your infection. Stopping your antibiotics early could lead to  reinfection and can also promote the spread of antibiotic resistant bacteria. Some antibiotics may cause certain side effects including: nausea, vomiting, diarrhea, bloating, indigestion, belly pain, and/or loss of appetite. Eating yogurt with live and active cultures and/or taking a probiotic and maintaining a healthy diet can help to reduce some of these side effects.     If your symptoms do not improve with our current treatment plan or worsen, please schedule an appointment with your PCP or proceed to the ER.           Follow up with PCP in 3-5 days.  Proceed to  ER if symptoms worsen.    Chief Complaint     Chief Complaint   Patient presents with   • Sinusitis     Started last week wants antibotic         History of Present Illness       54-year-old male presents the clinic for evaluation of sinus congestion x 14 days.  Patient reports associated symptoms including postnasal drip, sinus pressure, mild sore throat which improves throughout the day and sinus headaches.  Patient denies any fevers, chills, body aches, ear pain, cough, shortness of breath, chest pain, palpitations, nausea, vomiting, diarrhea or dizziness.  Patient reports taking OTC Sudafed with mild relief of symptoms.        Sinusitis  Associated symptoms include congestion, headaches (sinus headaches), sinus pressure and a sore throat (mild, improves throughout day). Pertinent negatives include no chills, coughing, ear pain or shortness of breath.       Review of Systems   Review of Systems   Constitutional:  Negative for chills and fever.   HENT:  Positive for congestion, postnasal drip, sinus pressure and sore throat (mild, improves throughout day). Negative for ear pain.    Eyes:  Negative for discharge and redness.   Respiratory:  Negative for cough and shortness of breath.    Cardiovascular:  Negative for chest pain and palpitations.   Gastrointestinal:  Negative for diarrhea, nausea and vomiting.   Musculoskeletal:  Negative for arthralgias  and myalgias.   Neurological:  Positive for headaches (sinus headaches). Negative for dizziness and light-headedness.         Current Medications       Current Outpatient Medications:   •  doxycycline hyclate (VIBRAMYCIN) 100 mg capsule, Take 1 capsule (100 mg total) by mouth every 12 (twelve) hours for 7 days, Disp: 14 capsule, Rfl: 0  •  lisinopril (ZESTRIL) 10 mg tablet, Take 1 tablet by mouth once daily, Disp: 90 tablet, Rfl: 1  •  Omega-3 1000 MG CAPS, Take by mouth in the morning, Disp: , Rfl:   •  Red Yeast Rice 600 MG TABS, Take by mouth in the morning, Disp: , Rfl:   •  rivaroxaban (Xarelto) 10 mg tablet, Take 1 tablet by mouth once daily, Disp: 90 tablet, Rfl: 1  •  sildenafil (VIAGRA) 25 MG tablet, 1 to 2 tablets p.o. daily as needed, Disp: 30 tablet, Rfl: 1  •  apixaban (Eliquis) 2.5 mg, Take by mouth (Patient not taking: Reported on 12/18/2024), Disp: , Rfl:   •  cyclobenzaprine (FLEXERIL) 10 mg tablet, Take 1 tablet (10 mg total) by mouth daily at bedtime for 14 days, Disp: 14 tablet, Rfl: 0  •  fluticasone (FLONASE) 50 mcg/act nasal spray, 2 sprays into each nostril daily (Patient not taking: Reported on 1/22/2025), Disp: 1 g, Rfl: 0  •  loratadine (CLARITIN) 10 mg tablet, Take 1 tablet (10 mg total) by mouth daily (Patient not taking: Reported on 1/22/2025), Disp: 30 tablet, Rfl: 0  •  methylPREDNISolone 4 MG tablet therapy pack, Use as directed on package (Patient not taking: Reported on 1/22/2025), Disp: 21 each, Rfl: 0    Current Allergies     Allergies as of 01/22/2025 - Reviewed 01/22/2025   Allergen Reaction Noted   • Contrast dye [iodinated contrast media] Throat Swelling 08/10/2023   • Penicillins  05/30/2019            The following portions of the patient's history were reviewed and updated as appropriate: allergies, current medications, past family history, past medical history, past social history, past surgical history and problem list.     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 was advised to have repeat in 6-12 months. Has been ordered already pending. Patient needs to have scheduled   • No known health problems     history of denial of any significant medical , no pertinent past medical history per allscripts   • Testicular hypogonadism    • Varicocele        Past Surgical History:   Procedure Laterality Date   • EYE SURGERY     • INGUINAL HERNIA REPAIR     • UMBILICAL HERNIA REPAIR      per allscripts   • VASECTOMY     • VEIN LIGATION AND STRIPPING         Family History   Problem Relation Age of Onset   • Heart disease Mother    • Heart attack Mother    • Breast cancer Mother    • Hypertension Mother    • Cerebral aneurysm Mother    • Prostate cancer Father    • Bone cancer Father    • Diabetes type II Father    • Testicular cancer Cousin    • Coronary artery disease Maternal Grandmother    • Colon cancer Paternal Grandmother    • No Known Problems Son    • No Known Problems Son          Medications have been verified.        Objective   /85   Pulse 102   Temp 97.7 °F (36.5 °C)   Resp 18   SpO2 95%        Physical Exam     Physical Exam  Vitals and nursing note reviewed.   Constitutional:       Appearance: Normal appearance.   HENT:      Head: Normocephalic and atraumatic.      Right Ear: Tympanic membrane, ear canal and external ear normal.      Left Ear: Tympanic membrane, ear canal and external ear normal.      Nose: Congestion present.      Right Sinus: Maxillary sinus tenderness present. No frontal sinus tenderness.      Left Sinus: Maxillary sinus tenderness present. No frontal sinus tenderness.      Mouth/Throat:      Pharynx: Posterior oropharyngeal erythema (mild) and postnasal drip present. No  oropharyngeal exudate.   Eyes:      General:         Right eye: No discharge.         Left eye: No discharge.      Conjunctiva/sclera: Conjunctivae normal.   Cardiovascular:      Rate and Rhythm: Normal rate and regular rhythm.      Pulses: Normal pulses.      Heart sounds: Normal heart sounds.   Pulmonary:      Effort: Pulmonary effort is normal.      Breath sounds: Normal breath sounds.   Abdominal:      General: Bowel sounds are normal.      Palpations: Abdomen is soft.   Musculoskeletal:         General: Normal range of motion.   Lymphadenopathy:      Cervical: No cervical adenopathy.   Skin:     General: Skin is warm and dry.   Neurological:      General: No focal deficit present.      Mental Status: He is alert.   Psychiatric:         Mood and Affect: Mood normal.         Behavior: Behavior normal.

## 2025-01-27 ENCOUNTER — OFFICE VISIT (OUTPATIENT)
Dept: PHYSICAL THERAPY | Facility: CLINIC | Age: 55
End: 2025-01-27
Payer: COMMERCIAL

## 2025-01-27 DIAGNOSIS — M54.50 MIDLINE LOW BACK PAIN WITHOUT SCIATICA, UNSPECIFIED CHRONICITY: Primary | ICD-10-CM

## 2025-01-27 PROCEDURE — 97530 THERAPEUTIC ACTIVITIES: CPT

## 2025-01-27 PROCEDURE — 97112 NEUROMUSCULAR REEDUCATION: CPT

## 2025-01-27 PROCEDURE — 97110 THERAPEUTIC EXERCISES: CPT

## 2025-01-27 NOTE — PROGRESS NOTES
"Daily Note     Today's date: 2025  Patient name: Lucas Gibson  : 1970  MRN: [id number]  Referring provider: Fernando Pinto PA-C  Dx:   Encounter Diagnosis     ICD-10-CM    1. Midline low back pain without sciatica, unspecified chronicity  M54.50                      Subjective: I feel I have turned the corner  No LBP right now but the day is young  I feel I am about 80% improvement since SOC  I have been cutting wood and giving dogs bath have some mm fatigue with overall < stiffness/discomfort        Objective: See treatment diary below      Assessment: Tolerated treatment well. Pt indicated > stiffness with trunk rotation to left vs right, denied any discomfort   Pt progressed with POC with > reps/resistance as chet and wt machines  Pt indicated > LB mm fatigue with bridges/bird dog exer  review self gentle LB stretches end rx session  Patient would benefit from continued PT      Plan: Continue per plan of care.      Insurance:  [employer]    Precautions: LBP, Hx DVT     Re-eval Date: 25    HEP access code: [email or web address]    Date        Visit Count 6       FOTO        Pain In        Pain Out            Manuals        L/S STM  NP [id number]     IASTM/STM to LB, L/S & PSIS       L/S P-A mobs                         Neuro Re-Ed        Repeated motions         EIS         PPU  X20 5\" hold       PPU sag         EIS dowel  20x NP       PPT        PPT w/march        PPT w/SLR        PPT w/BKFO        PPT w/heel slides        Posture re-education         Bird Dog  X10, 5\"   ea                                Ther Ex        Row  K 25# 3x10 w/ pause        LPD  [place] 25#  3\" 3x10  With TA       Paloff Press  K 10# x15, 5\" B        Trunk rotation  K 8# x10 B        NuStep  L6 x10 min  for endurance, trunk rotation       Bridges 5x w/ 10 marches  Cues AH       S/L Hip Abd        Clamshells         Open book  X20 ea  (HEP)        Cat cow  X20 ea  (HEP)       LTR  X20 ea (HEP)        Leg Press  120# 3x15  1x10        "        Ther Activity        Suitcase carry  10#  3x laps (full [place]) ea side   W/ 2x10 suitcase squats    Cues lifting mechanices       DL / lifting mechanics  Review lifting mechanics        Gait Training                        Modalities

## 2025-01-29 ENCOUNTER — OFFICE VISIT (OUTPATIENT)
Dept: PHYSICAL THERAPY | Facility: CLINIC | Age: 55
End: 2025-01-29
Payer: COMMERCIAL

## 2025-01-29 DIAGNOSIS — M54.50 MIDLINE LOW BACK PAIN WITHOUT SCIATICA, UNSPECIFIED CHRONICITY: Primary | ICD-10-CM

## 2025-01-29 PROCEDURE — 97535 SELF CARE MNGMENT TRAINING: CPT

## 2025-01-29 PROCEDURE — 97110 THERAPEUTIC EXERCISES: CPT

## 2025-01-29 PROCEDURE — 97112 NEUROMUSCULAR REEDUCATION: CPT

## 2025-01-29 NOTE — PROGRESS NOTES
30 day Hold / PT Discharge    Today's date: 2025  Patient name: Lucas Gibson  : 1970  MRN: [id number]  Referring provider: Fernando Pinto PA-C  Dx:   Encounter Diagnosis     ICD-10-CM    1. Midline low back pain without sciatica, unspecified chronicity  M54.50           Start Time: 0700  Stop Time: 0740  Total time in clinic (min): 40 minutes    Assessment  Impairments: abnormal or restricted ROM, activity intolerance, impaired physical strength, pain with function, participation limitations, activity limitations and endurance  Symptom irritability: none     Assessment details: Lucas is a 54 year old patient presenting to OPPT for evaluation regarding lumbar pain and discomfort. Upon evaluation they show impairments in the following areas: decreased core, proximal LE strength evident. Increased L sided lower back discomfort with R sided strength testing. All symptoms able to be abolished with prone press up activity - indicative of derangement classification. Further reinforced postural re-education with seated postural correction in addition to postural muscular strength and endurance. These impairments limit their ability to perform daily activities as well as their previous level of function causing decreased quality of life. Patient demonstrated and verbalized good understanding of HEP and POC going forward. Session emphasis continued with extension based movements to reduce symptomology, coupled with trunk/core strengthening/endurance with preparation for return to gym activities.      Patient requires continued skilled PT services in order to improve aforementioned impairments so that they are able to return to highest level of function possible. Without initiation of skilled PT services, patient will continue to have lumbar pain limiting his ability to perform daily activities as desired, limiting his quality of life. .    Update 25: Lucas is a 54 year old patient presenting to OPPT for  re-evaluation and subsequent discharge from skilled care. Lucas has reported 90-90% subjective improvement and global rating of change. His lumbar active range of motion has improved nicely to within normal limits. Only mild symptoms remain with repeated flexion. Lucas's presentation extension preference/bias responder derangement. EIL with belt overpressure and sustained extension did best with reduction of mild tightness in the lower back. Lucas feels comfortable managing in the maintenance phase of this reduction at this point, will be discharged with HEP with extension based movements 3-5 times per day. Also encouraged postural strengthening that has been a part of his HEP thus far. Lucas has met all long term goals for therapy. Lucas is in agreement with plan of 30 day hold with ideal of discharge to HEP.      Understanding of Dx/Px/POC: excellent     Prognosis: excellent     Goals  Short-Term Goals (4 weeks)   1.  Patient will decrease worst rating of pain by 2/10 to improve quality of life - MET   2. Pt will increase strength by 0.5/5 to improve quality of life with improved efficiency of transfers and daily activities - MET   3. Patient will be able to perform home and household duties with 2/10 reduction in pain indicating improved QOL  - MET   4. Patient will improve L/S AROM by 25% indicating improved mobility of affected area  - MET         Long-Term Goals (8 weeks)   1. Patient's L/S will be WFL indicating improvement in L/S AROM capacities - MET   2. Patient will decrease pain by 4/10 at worst in comparison to IE indicating significant reduction in pain and improved quality of life - MET   3. Patient will be able to increase maximal walking distance by 1 mile  indicating improved respiratory and musculoskeletal function  - MET   4. Patient will be able to perform household and hobby activities without increase in pain > or equal to 2/10 indicating ability to independently manage pain symptoms to accomplish  "daily activities. - MET   5. Patient will be independent with HEP with good form accomplished - MET      Plan  Patient would benefit from: 30 day hold, potential discharge skilled physical therapy if symptoms keep at bay over the next 30 days  Planned modality interventions: cryotherapy and thermotherapy: hydrocollator packs     Planned therapy interventions: manual therapy, nerve gliding, neuromuscular re-education, self care, strengthening, stretching, therapeutic activities, therapeutic exercise, home exercise program, gait training, flexibility and balance     Frequency: 2x week  Plan of Care beginning date: 12/31/2024  Plan of Care expiration date: 3/1/2025  Treatment plan discussed with: patient           Subjective Evaluation     History of Present Illness  Mechanism of injury: Lucas is a 54 year old patient presenting to OPPT for evaluation regarding LBP. Has a back brace, uses it to lift things that are heavy. Generally, has been more so uncomfortable. Happened while he was at work when he bent over to  a bag of chips. Primarily describes it as a uncomfortable, achey feeling. Has been going on/off for > 3 months. Insidious onset. Recently put on steroid pack and muscle relaxers.      He works nights, on [employer] - it was the best that it has felt recently. 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 A few weeks ago/month - he bent over to  a table and that set the symptoms off. Likes to do outdoors things - cutting wood.      Per EMR: \"Patient is a 54-year-old male presenting today with low back pain x 2 days.  Patient notes he has been dealing with low back pain on and off for the last few months, notes he was helping a friend move a cut down tree a couple days ago which seem to aggravate his low back, is experiencing pain and discomfort on both sides, has not taken any medication " "limiting factors.  Denies numbness, tingling, bruising, swelling, saddle anesthesia, urinary or bowel incontinence.\"     Subjective 25: Lucas is doing well. Has been getting good workouts while he has been coming to therapy. Pain has abolished at rest, has returned to work with no symptomatic increases while at work. Does have very mild reports of tightness in the L sided low back but this was abolished/reduced with belt overpressure. Discussed strategies for doing this at home. Feels comfortable with discharge today and feels like he has appropriate means and skilled to continue with exercises while at home.     Pain  Current pain ratin  At best pain ratin  At worst pain ratin  Location: Lower back  Quality: dull ache  Relieving factors: heat (helps in the shower. TENs units sometimes)  Exacerbated by: Bending over and lifting object.  Progression: improved     Objective     Postural Observation             Sitting: lordotic/neutral/kyphotic  Standing: lordotic/neutral/kyphotic  Postural Change: no effect (0 sx to start)   Lateral Shift: right/left/nil  Shift Relevant: yes/no     Lower Quarter Screen:  Myotomes  Right Hip Flexion (L1-3): 5   Left Hip Flexion (L1-3): 5  R Knee Ext (L3-4): 5   L Knee Ext (L3-4): 5  R DF (L4): 5  L DF (L4):5  R EDL (L5): nt   L EDL (L5): nt   R PF (S1): 5  L PF (S1): 5  R Knee Flex (S1-2): 5   L Knee Flex (S1-2): 5     Neurodynamic Testing  Slump Test: (-)   Passive SLR: (-)    Femoral Nerve Traction Test: (-)      Lumbar Active Range of Motion (pre 1/10 in LB)   Movement Loss Symptoms [place] Mod Min Nil Symptoms   Flexion      XXX  None    Extension  (EIS)        XXX None    R SG       XXX none      L SG       XXX  none      Other                  Precautions: LBP, Hx DVT     Re-eval Date: 25    HEP access code: [email or web address]    Date       Visit Count 6 7       FOTO        Pain In        Pain Out            Manuals        L/S STM  NP [id number]     IASTM/STM to LB, " "L/S & PSIS       L/S P-A mobs                         Neuro Re-Ed        Repeated motions         EIS         PPU  X20 5\" hold 2x20 with sag       PPU sag         PPU belt overpressure   20x       EIS dowel  20x NP       PPT        PPT w/march        PPT w/SLR        PPT w/BKFO        PPT w/heel slides        Posture re-education         Bird Dog  X10, 5\"   ea        Sustained extension   4 mins end range                       Ther Ex        Row  K 25# 3x10 w/ pause  Review       LPD  [place] 25#  3\" 3x10  With TA Review       Paloff Press  K 10# x15, 5\" B  Review       Trunk rotation  K 8# x10 B  Review       NuStep  L6 x10 min  for endurance, trunk rotation       Bridges 5x w/ 10 marches  Cues AH Review with march       S/L Hip Abd        Clamshells         Open book  X20 ea  (HEP)        Cat cow  X20 ea  (HEP)       LTR  X20 ea (HEP)        Leg Press  120# 3x15  1x10               Ther Activity        Suitcase carry  10#  3x laps (full [place]) ea side   W/ 2x10 suitcase squats    Cues lifting mechanices       DL / lifting mechanics  Review lifting mechanics        Gait Training                        Modalities        Self care   Posture re-education x8 mins                   Access Code: [email or web address]  URL: [email or web address]  Date: 01/29/2025  Prepared by: Ravi Johnson    Exercises  - Standing Shoulder Row with Anchored Resistance  - 1 x daily - 7 x weekly - 3 sets - 10 reps  - Shoulder extension with resistance - Neutral  - 1 x daily - 7 x weekly - 3 sets - 10 reps  - Supine Bridge  - 1 x daily - 7 x weekly - 3 sets - 10 reps  - Supine Bridge with Resistance Band  - 1 x daily - 7 x weekly - 3 sets - 10 reps  - Kettlebell Suitcase Carry  - 1 x daily - 7 x weekly - 3 sets - 10 reps  - Prone Press Up  - 1 x daily - 7 x weekly - 3 sets - 10 reps  - Prone Press Up On Elbows  - 1 x daily - 7 x weekly - 3 sets - 10 reps  - Standing Anti-Rotation Press with Anchored Resistance  - 1 x daily - 7 x weekly - " 3 sets - 10 reps  - Cat Cow  - 1 x daily - 7 x weekly - 3 sets - 10 reps  - Sidelying Open Book Thoracic Lumbar Rotation and Extension  - 1 x daily - 7 x weekly - 3 sets - 10 reps  - Standing Trunk Rotation with Resistance  - 1 x daily - 7 x weekly - 3 sets - 10 reps

## 2025-02-04 ENCOUNTER — OFFICE VISIT (OUTPATIENT)
Dept: URGENT CARE | Facility: CLINIC | Age: 55
End: 2025-02-04
Payer: COMMERCIAL

## 2025-02-04 VITALS
TEMPERATURE: 97.9 F | HEART RATE: 88 BPM | RESPIRATION RATE: 18 BRPM | DIASTOLIC BLOOD PRESSURE: 71 MMHG | OXYGEN SATURATION: 97 % | SYSTOLIC BLOOD PRESSURE: 117 MMHG

## 2025-02-04 DIAGNOSIS — R35.0 URINARY FREQUENCY: Primary | ICD-10-CM

## 2025-02-04 LAB
SL AMB  POCT GLUCOSE, UA: NEGATIVE
SL AMB LEUKOCYTE ESTERASE,UA: NEGATIVE
SL AMB POCT BILIRUBIN,UA: NEGATIVE
SL AMB POCT BLOOD,UA: NEGATIVE
SL AMB POCT CLARITY,UA: CLEAR
SL AMB POCT COLOR,UA: YELLOW
SL AMB POCT KETONES,UA: NEGATIVE
SL AMB POCT NITRITE,UA: NEGATIVE
SL AMB POCT PH,UA: 7
SL AMB POCT SPECIFIC GRAVITY,UA: 1.01
SL AMB POCT URINE PROTEIN: NEGATIVE
SL AMB POCT UROBILINOGEN: 0.2

## 2025-02-04 PROCEDURE — G0382 LEV 3 HOSP TYPE B ED VISIT: HCPCS | Performed by: PHYSICIAN ASSISTANT

## 2025-02-04 PROCEDURE — 81002 URINALYSIS NONAUTO W/O SCOPE: CPT | Performed by: PHYSICIAN ASSISTANT

## 2025-02-04 PROCEDURE — 87086 URINE CULTURE/COLONY COUNT: CPT | Performed by: PHYSICIAN ASSISTANT

## 2025-02-04 NOTE — PATIENT INSTRUCTIONS
Urine dip nonsuggestive of UTI, will send urine for culture.  Keep scheduled follow-up appointment with urology in 2 days.  All patient's questions answered and is agreeable with this.

## 2025-02-04 NOTE — PROGRESS NOTES
[facility]        NAME: Lucas Gibson is a 54 y.o. male  : 1970    MRN: [id number]  DATE: 2025  TIME: 9:05 AM    Assessment and Plan   Urinary frequency [R35.0]  1. Urinary frequency  POCT urine dip    Urine culture            Patient Instructions     Patient Instructions   Urine dip nonsuggestive of UTI, will send urine for culture.  Keep scheduled follow-up appointment with urology in 2 days.  All patient's questions answered and is agreeable with this.      Follow up with PCP in 3-5 days.  Proceed to  ER if symptoms worsen.    Chief Complaint     Chief Complaint   Patient presents with    Possible UTI     Started 1 weekago finished meds for sinus          History of Present Illness       Patient is a 54-year-old male presenting today with UTI symptoms x 1 week.  Patient notes about a week ago he started with some lower abdominal pressure around his groin area, notes over the last couple days he has had some difficulty voiding and weak stream, is worried about possible UTI.  Does have appointment with urology in 2 days on 2025.  Has not taken any medication limiting factors for symptoms.  Denies flank pain, fever, hematuria.        Review of Systems   Review of Systems   Constitutional:  Negative for chills and fever.   HENT:  Negative for ear pain and sore throat.    Eyes:  Negative for pain and visual disturbance.   Respiratory:  Negative for cough and shortness of breath.    Cardiovascular:  Negative for chest pain and palpitations.   Gastrointestinal:  Negative for abdominal pain and vomiting.   Genitourinary:  Positive for difficulty urinating, frequency and urgency. Negative for dysuria and hematuria.   Musculoskeletal:  Negative for arthralgias and back pain.   Skin:  Negative for color change and rash.   Neurological:  Negative for seizures and syncope.   All other systems reviewed and are negative.        Current Medications       Current Outpatient Medications:      lisinopril (ZESTRIL) 10 mg tablet, Take 1 tablet by mouth once daily, Disp: 90 tablet, Rfl: 1    Omega-3 1000 MG CAPS, Take by mouth in the morning, Disp: , Rfl:     Red Yeast Rice 600 MG TABS, Take by mouth in the morning, Disp: , Rfl:     rivaroxaban (Xarelto) 10 mg tablet, Take 1 tablet by mouth once daily, Disp: 90 tablet, Rfl: 1    apixaban (Eliquis) 2.5 mg, Take by mouth (Patient not taking: Reported on 2/4/2025), Disp: , Rfl:     cyclobenzaprine (FLEXERIL) 10 mg tablet, Take 1 tablet (10 mg total) by mouth daily at bedtime for 14 days, Disp: 14 tablet, Rfl: 0    fluticasone (FLONASE) 50 mcg/act nasal spray, 2 sprays into each nostril daily (Patient not taking: Reported on 1/22/2025), Disp: 1 g, Rfl: 0    loratadine (CLARITIN) 10 mg tablet, Take 1 tablet (10 mg total) by mouth daily (Patient not taking: Reported on 1/22/2025), Disp: 30 tablet, Rfl: 0    methylPREDNISolone 4 MG tablet therapy pack, Use as directed on package (Patient not taking: Reported on 2/4/2025), Disp: 21 each, Rfl: 0    sildenafil (VIAGRA) 25 MG tablet, 1 to 2 tablets p.o. daily as needed, Disp: 30 tablet, Rfl: 1    Current Allergies     Allergies as of 02/04/2025 - Reviewed 02/04/2025   Allergen Reaction Noted    Contrast dye [iodinated contrast media] Throat Swelling 08/10/2023    Penicillins  05/30/2019            The following portions of the patient's history were reviewed and updated as appropriate: allergies, current medications, past family history, past medical history, past social history, past surgical history and problem list.     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 was advised to have repeat in 6-12 months. Has been ordered already pending. Patient needs to have  scheduled    No known health problems     history of denial of any significant medical , no pertinent past medical history per allscripts    Testicular hypogonadism     Varicocele        Past Surgical History:   Procedure Laterality Date    EYE SURGERY      INGUINAL HERNIA REPAIR      UMBILICAL HERNIA REPAIR      per allscripts    VASECTOMY      VEIN LIGATION AND STRIPPING         Family History   Problem Relation Age of Onset    Heart disease Mother     Heart attack Mother     Breast cancer Mother     Hypertension Mother     Cerebral aneurysm Mother     Prostate cancer Father     Bone cancer Father     Diabetes type II Father     Testicular cancer Cousin     Coronary artery disease Maternal Grandmother     Colon cancer Paternal Grandmother     No Known Problems Son     No Known Problems Son          Medications have been verified.        Objective   /71   Pulse 88   Temp 97.9 °F (36.6 °C)   Resp 18   SpO2 97%        Physical Exam     Physical Exam  Vitals reviewed.   Constitutional:       General: He is not in acute distress.  Cardiovascular:      Rate and Rhythm: Normal rate and regular rhythm.      Pulses: Normal pulses.      Heart sounds: Normal heart sounds.   Pulmonary:      Effort: Pulmonary effort is normal.      Breath sounds: Normal breath sounds.   Abdominal:      General: Abdomen is flat. Bowel sounds are normal.      Palpations: Abdomen is soft.      Tenderness: There is no abdominal tenderness. There is no right CVA tenderness or left CVA tenderness.   Skin:     General: Skin is warm.      Capillary Refill: Capillary refill takes less than 2 seconds.   Neurological:      General: No focal deficit present.      Mental Status: He is alert.   Psychiatric:         Mood and Affect: Mood normal.

## 2025-02-05 ENCOUNTER — HOSPITAL ENCOUNTER (EMERGENCY)
Facility: HOSPITAL | Age: 55
Discharge: HOME | End: 2025-02-05
Attending: FAMILY MEDICINE | Admitting: FAMILY MEDICINE
Payer: COMMERCIAL

## 2025-02-05 ENCOUNTER — APPOINTMENT (EMERGENCY)
Dept: CT IMAGING | Facility: HOSPITAL | Age: 55
End: 2025-02-05
Payer: COMMERCIAL

## 2025-02-05 VITALS
WEIGHT: 249 LBS | BODY MASS INDEX: 32.85 KG/M2 | TEMPERATURE: 98.4 F | RESPIRATION RATE: 16 BRPM | SYSTOLIC BLOOD PRESSURE: 139 MMHG | OXYGEN SATURATION: 97 % | HEART RATE: 81 BPM | DIASTOLIC BLOOD PRESSURE: 88 MMHG

## 2025-02-05 DIAGNOSIS — R10.9 RIGHT FLANK PAIN: Primary | ICD-10-CM

## 2025-02-05 DIAGNOSIS — R35.0 URINARY FREQUENCY: ICD-10-CM

## 2025-02-05 LAB
ANION GAP SERPL CALCULATED.3IONS-SCNC: 6 MMOL/L (ref 4–13)
BACTERIA UR CULT: NORMAL
BASOPHILS # BLD AUTO: 0.05 THOUSANDS/ΜL (ref 0–0.1)
BASOPHILS NFR BLD AUTO: 1 % (ref 0–1)
BILIRUB UR QL STRIP: NEGATIVE
BUN SERPL-MCNC: 19 MG/DL (ref 5–25)
CALCIUM SERPL-MCNC: 9.3 MG/DL (ref 8.4–10.2)
CHLORIDE SERPL-SCNC: 104 MMOL/L (ref 96–108)
CLARITY UR: CLEAR
CO2 SERPL-SCNC: 28 MMOL/L (ref 21–32)
COLOR UR: YELLOW
CREAT SERPL-MCNC: 1.03 MG/DL (ref 0.6–1.3)
EOSINOPHIL # BLD AUTO: 0.19 THOUSAND/ΜL (ref 0–0.61)
EOSINOPHIL NFR BLD AUTO: 2 % (ref 0–6)
ERYTHROCYTE [DISTWIDTH] IN BLOOD BY AUTOMATED COUNT: 12 % (ref 11.6–15.1)
GFR SERPL CREATININE-BSD FRML MDRD: 80 ML/MIN/1.73SQ M
GLUCOSE SERPL-MCNC: 98 MG/DL (ref 65–140)
GLUCOSE UR STRIP-MCNC: NEGATIVE MG/DL
HCT VFR BLD AUTO: 47.5 % (ref 36.5–49.3)
HGB BLD-MCNC: 15.9 G/DL (ref 12–17)
HGB UR QL STRIP.AUTO: NEGATIVE
IMM GRANULOCYTES # BLD AUTO: 0.01 THOUSAND/UL (ref 0–0.2)
IMM GRANULOCYTES NFR BLD AUTO: 0 % (ref 0–2)
KETONES UR STRIP-MCNC: NEGATIVE MG/DL
LEUKOCYTE ESTERASE UR QL STRIP: NEGATIVE
LYMPHOCYTES # BLD AUTO: 1.99 THOUSANDS/ΜL (ref 0.6–4.47)
LYMPHOCYTES NFR BLD AUTO: 25 % (ref 14–44)
MCH RBC QN AUTO: 31.9 PG (ref 26.8–34.3)
MCHC RBC AUTO-ENTMCNC: 33.5 G/DL (ref 31.4–37.4)
MCV RBC AUTO: 95 FL (ref 82–98)
MONOCYTES # BLD AUTO: 0.84 THOUSAND/ΜL (ref 0.17–1.22)
MONOCYTES NFR BLD AUTO: 11 % (ref 4–12)
NEUTROPHILS # BLD AUTO: 4.79 THOUSANDS/ΜL (ref 1.85–7.62)
NEUTS SEG NFR BLD AUTO: 61 % (ref 43–75)
NITRITE UR QL STRIP: NEGATIVE
NRBC BLD AUTO-RTO: 0 /100 WBCS
PH UR STRIP.AUTO: 6 [PH]
PLATELET # BLD AUTO: 211 THOUSANDS/UL (ref 149–390)
PMV BLD AUTO: 9.2 FL (ref 8.9–12.7)
POTASSIUM SERPL-SCNC: 4.1 MMOL/L (ref 3.5–5.3)
PROT UR STRIP-MCNC: NEGATIVE MG/DL
RBC # BLD AUTO: 4.98 MILLION/UL (ref 3.88–5.62)
SODIUM SERPL-SCNC: 138 MMOL/L (ref 135–147)
SP GR UR STRIP.AUTO: 1.02
UROBILINOGEN UR QL STRIP.AUTO: 0.2 E.U./DL
WBC # BLD AUTO: 7.87 THOUSAND/UL (ref 4.31–10.16)

## 2025-02-05 PROCEDURE — 96374 THER/PROPH/DIAG INJ IV PUSH: CPT

## 2025-02-05 PROCEDURE — 74176 CT ABD & PELVIS W/O CONTRAST: CPT

## 2025-02-05 PROCEDURE — 80048 BASIC METABOLIC PNL TOTAL CA: CPT | Performed by: FAMILY MEDICINE

## 2025-02-05 PROCEDURE — 81003 URINALYSIS AUTO W/O SCOPE: CPT | Performed by: FAMILY MEDICINE

## 2025-02-05 PROCEDURE — 99284 EMERGENCY DEPT VISIT MOD MDM: CPT | Performed by: FAMILY MEDICINE

## 2025-02-05 PROCEDURE — 99284 EMERGENCY DEPT VISIT MOD MDM: CPT

## 2025-02-05 PROCEDURE — 85025 COMPLETE CBC W/AUTO DIFF WBC: CPT | Performed by: FAMILY MEDICINE

## 2025-02-05 PROCEDURE — 36415 COLL VENOUS BLD VENIPUNCTURE: CPT | Performed by: FAMILY MEDICINE

## 2025-02-05 PROCEDURE — 96361 HYDRATE IV INFUSION ADD-ON: CPT

## 2025-02-05 RX ORDER — KETOROLAC TROMETHAMINE 30 MG/ML
30 INJECTION, SOLUTION INTRAMUSCULAR; INTRAVENOUS ONCE
Status: COMPLETED | OUTPATIENT
Start: 2025-02-05 | End: 2025-02-05

## 2025-02-05 RX ADMIN — KETOROLAC TROMETHAMINE 30 MG: 30 INJECTION, SOLUTION INTRAMUSCULAR at 07:31

## 2025-02-05 RX ADMIN — SODIUM CHLORIDE 1000 ML: 0.9 INJECTION, SOLUTION INTRAVENOUS at 07:27

## 2025-02-05 NOTE — ED PROVIDER NOTES
Time reflects when diagnosis was documented in both MDM as applicable and the Disposition within this note       Time User Action Codes Description Comment    2/5/2025  8:21 AM Lizandro Fair Add [R10.9] Right flank pain     2/5/2025  8:38 AM Lizandro Fair Add [R35.0] Urinary frequency           ED Disposition       ED Disposition   Discharge    Condition   Stable    Date/Time   Wed Feb 5, 2025  8:21 AM    Comment   Lucas Gibson discharge to home/self care.                   Assessment & Plan       Medical Decision Making  Amount and/or Complexity of Data Reviewed  Labs: ordered.  Radiology: ordered.    Risk  Prescription drug management.    54-year-old male presented to ED with complaint of right-sided flank pain ongoing for past week intermittently.  States went to urgent care yesterday was having difficulty urinating did a UA which was negative.  Patient states he has an appointment with urology tomorrow however wanted to get checked.  States that he feels that he is not able to empty his bladder completely.  Differential diagnoses include UTI/renal stone/prostate enlargement  Plan will obtain lab CBC BMP CT renal stone studies UA  Lab reviewed within normal limits UA negative  Will obtain bladder scan  Pending CT  Bladder scan postvoid residual volume is 7 mL  CT reviewed within normal limits discussed with patient and wife was at bedside recommending to follow-up with urology tomorrow patient has an appointment should precaution regarding return recommended continue with hydration take Tylenol as needed for flank pain.  Patient verbalized understand the plan discharge home.       Medications   sodium chloride 0.9 % bolus 1,000 mL (0 mL Intravenous Stopped 2/5/25 0830)   ketorolac (TORADOL) injection 30 mg (30 mg Intravenous Given 2/5/25 0731)       ED Risk Strat Scores                          SBIRT 22yo+      Flowsheet Row Most Recent Value   Initial Alcohol Screen: US AUDIT-C     1. How often do you have a  drink containing alcohol? 0 Filed at: 02/05/2025 0733   2. How many drinks containing alcohol do you have on a typical day you are drinking?  0 Filed at: 02/05/2025 0733   3a. Male UNDER 65: How often do you have five or more drinks on one occasion? 0 Filed at: 02/05/2025 0733   3b. FEMALE Any Age, or MALE 65+: How often do you have 4 or more drinks on one occassion? 0 Filed at: 02/05/2025 0733   Audit-C Score 0 Filed at: 02/05/2025 0733   ELVIRA: How many times in the past year have you...    Used an illegal drug or used a prescription medication for non-medical reasons? 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 was advised to have repeat in 6-12 months. Has been ordered already pending. Patient needs to have scheduled    No known health problems     history of denial of any significant medical , no pertinent past medical history per allscripts    Testicular hypogonadism     Varicocele       Past Surgical History:   Procedure Laterality Date    EYE SURGERY      INGUINAL HERNIA REPAIR      UMBILICAL HERNIA REPAIR      per allscripts    VASECTOMY      VEIN LIGATION AND STRIPPING        Family History   Problem Relation Age of Onset    Heart disease Mother     Heart attack Mother     Breast cancer Mother     Hypertension Mother     Cerebral aneurysm Mother     Prostate cancer Father     Bone cancer Father     Diabetes type II Father     Testicular cancer Cousin     Coronary artery disease Maternal Grandmother     Colon cancer Paternal Grandmother     No  Known Problems Son     No Known Problems Son       Social History     Tobacco Use    Smoking status: Never    Smokeless tobacco: Never   Vaping Use    Vaping status: Never Used   Substance Use Topics    Alcohol use: Yes     Comment: Occasionally    Drug use: No      E-Cigarette/Vaping    E-Cigarette Use Never User       E-Cigarette/Vaping Substances    Nicotine No     THC No     CBD No     Flavoring No     Other No     Unknown No       I have reviewed and agree with the history as documented.     HPI  54-year-old male presented to ED with complaint of right-sided flank pain ongoing for past week intermittently.  States went to urgent care yesterday was having difficulty urinating did a UA which was negative.  Patient states he has an appointment with urology tomorrow however wanted to get checked.  States that he feels that he is not able to empty his bladder completely.  Review of Systems   Constitutional:  Negative for chills and fever.   HENT:  Negative for rhinorrhea and sore throat.    Eyes:  Negative for visual disturbance.   Respiratory:  Negative for cough and shortness of breath.    Cardiovascular:  Negative for chest pain and leg swelling.   Gastrointestinal:  Negative for abdominal pain, diarrhea, nausea and vomiting.   Genitourinary:  Positive for difficulty urinating and flank pain. Negative for dysuria.   Musculoskeletal:  Negative for back pain and myalgias.   Skin:  Negative for rash.   Neurological:  Negative for dizziness and headaches.   Psychiatric/Behavioral:  Negative for confusion.    All other systems reviewed and are negative.          Objective       ED Triage Vitals   Temperature Pulse Blood Pressure Respirations SpO2 Patient Position - Orthostatic VS   02/05/25 0717 02/05/25 0717 02/05/25 0718 02/05/25 0717 02/05/25 0717 02/05/25 0745   98.4 °F (36.9 °C) 83 128/66 16 98 % Sitting      Temp Source Heart Rate Source BP Location FiO2 (%) Pain Score    02/05/25 0717 02/05/25 0717 02/05/25  0745 -- 02/05/25 0717    Tympanic Monitor Left arm  1      Vitals      Date and Time Temp Pulse SpO2 Resp BP Pain Score FACES Pain Rating User   02/05/25 0801 -- -- -- -- -- 1 -- AMF   02/05/25 0745 -- 81 97 % 16 139/88 -- -- AMF   02/05/25 0718 -- -- -- -- 128/66 -- -- NAD   02/05/25 0717 98.4 °F (36.9 °C) 83 98 % 16 -- 1 -- NAD            Physical Exam  Vitals and nursing note reviewed.   Constitutional:       General: He is not in acute distress.     Appearance: He is well-developed. He is not diaphoretic.   HENT:      Head: Normocephalic and atraumatic.      Right Ear: External ear normal.      Left Ear: External ear normal.      Nose: Nose normal.      Mouth/Throat:      Mouth: Mucous membranes are moist.      Pharynx: Oropharynx is clear. No posterior oropharyngeal erythema.   Eyes:      Conjunctiva/sclera: Conjunctivae normal.      Pupils: Pupils are equal, round, and reactive to light.   Cardiovascular:      Rate and Rhythm: Normal rate and regular rhythm.      Pulses: Normal pulses.      Heart sounds: Normal heart sounds.   Pulmonary:      Effort: Pulmonary effort is normal. No respiratory distress.      Breath sounds: Normal breath sounds. No wheezing.   Abdominal:      General: Bowel sounds are normal. There is no distension.      Palpations: Abdomen is soft.      Tenderness: There is no abdominal tenderness.   Musculoskeletal:         General: Normal range of motion.      Cervical back: Normal range of motion and neck supple.   Lymphadenopathy:      Cervical: No cervical adenopathy.   Skin:     General: Skin is warm and dry.      Capillary Refill: Capillary refill takes less than 2 seconds.   Neurological:      Mental Status: He is alert and oriented to person, place, and time.   Psychiatric:         Mood and Affect: Mood normal.         Behavior: Behavior normal.         Results Reviewed       Procedure Component Value Units Date/Time    Basic metabolic panel [[id number]] Collected: 02/05/25 0727    Lab  Status: Final result Specimen: Blood from Arm, Right Updated: 02/05/25 0751     Sodium 138 mmol/L      Potassium 4.1 mmol/L      Chloride 104 mmol/L      CO2 28 mmol/L      ANION GAP 6 mmol/L      BUN 19 mg/dL      Creatinine 1.03 mg/dL      Glucose 98 mg/dL      Calcium 9.3 mg/dL      eGFR 80 ml/min/1.73sq m     Narrative:      National [employer] guidelines for Chronic Kidney Disease (CKD):     Stage 1 with normal or high GFR (GFR > 90 mL/min/1.73 square meters)    Stage 2 Mild CKD (GFR = 60-89 mL/min/1.73 square meters)    Stage 3A Moderate CKD (GFR = 45-59 mL/min/1.73 square meters)    Stage 3B Moderate CKD (GFR = 30-44 mL/min/1.73 square meters)    Stage 4 Severe CKD (GFR = 15-29 mL/min/1.73 square meters)    Stage 5 End Stage CKD (GFR <15 mL/min/1.73 square meters)  Note: GFR calculation is accurate only with a steady state creatinine    UA w Reflex to Microscopic w Reflex to Culture [[id number]]  (Normal) Collected: 02/05/25 0724    Lab Status: Final result Specimen: Urine, Clean Catch Updated: 02/05/25 0735     Color, UA Yellow     Clarity, UA Clear     Specific Gravity, UA 1.025     pH, UA 6.0     Leukocytes, UA Negative     Nitrite, UA Negative     Protein, UA Negative mg/dl      Glucose, UA Negative mg/dl      Ketones, UA Negative mg/dl      Urobilinogen, UA 0.2 E.U./dl      Bilirubin, UA Negative     Occult Blood, UA Negative    CBC and differential [[id number]] Collected: 02/05/25 0727    Lab Status: Final result Specimen: Blood from Arm, Right Updated: 02/05/25 0749     WBC 7.87 Thousand/uL      RBC 4.98 Million/uL      Hemoglobin 15.9 g/dL      Hematocrit 47.5 %      MCV 95 fL      MCH 31.9 pg      MCHC 33.5 g/dL      RDW 12.0 %      MPV 9.2 fL      Platelets 211 Thousands/uL      nRBC 0 /100 WBCs      Segmented % 61 %      Immature Grans % 0 %      Lymphocytes % 25 %      Monocytes % 11 %      Eosinophils Relative 2 %      Basophils Relative 1 %      Absolute Neutrophils 4.79  Thousands/µL      Absolute Immature Grans 0.01 Thousand/uL      Absolute Lymphocytes 1.99 Thousands/µL      Absolute Monocytes 0.84 Thousand/µL      Eosinophils Absolute 0.19 Thousand/µL      Basophils Absolute 0.05 Thousands/µL             CT renal stone study abdomen pelvis wo contrast   Final Interpretation by Jelani Aviles DO ([id number])      No urinary tract calculi.      Colonic diverticulosis without diverticulitis.      Limited study without IV or oral contrast.         Workstation performed: [id number]             Procedures    ED Medication and Procedure Management   Prior to Admission Medications   Prescriptions Last Dose Informant Patient Reported? Taking?   Omega-3 1000 MG CAPS  Self Yes No   Sig: Take by mouth in the morning   Red Yeast Rice 600 MG TABS  Self Yes No   Sig: Take by mouth in the morning   apixaban (Eliquis) 2.5 mg   Yes No   Sig: Take by mouth   Patient not taking: Reported on 2025   cyclobenzaprine (FLEXERIL) 10 mg tablet   No No   Sig: Take 1 tablet (10 mg total) by mouth daily at bedtime for 14 days   fluticasone (FLONASE) 50 mcg/act nasal spray  Self No No   Si sprays into each nostril daily   Patient not taking: Reported on 2025   lisinopril (ZESTRIL) 10 mg tablet   No No   Sig: Take 1 tablet by mouth once daily   loratadine (CLARITIN) 10 mg tablet  Self No No   Sig: Take 1 tablet (10 mg total) by mouth daily   Patient not taking: Reported on 2025   methylPREDNISolone 4 MG tablet therapy pack   No No   Sig: Use as directed on package   Patient not taking: Reported on 2025   rivaroxaban (Xarelto) 10 mg tablet   No No   Sig: Take 1 tablet by mouth once daily   sildenafil (VIAGRA) 25 MG tablet  Self No No   Si to 2 tablets p.o. daily as needed      Facility-Administered Medications: None     Patient's Medications   Discharge Prescriptions    No medications on file     No discharge procedures on file.  ED SEPSIS DOCUMENTATION   Time reflects when  diagnosis was documented in both MDM as applicable and the Disposition within this note       Time User Action Codes Description Comment    2/5/2025  8:21 AM Lizandro Fair [R10.9] Right flank pain     2/5/2025  8:38 AM Lizandro Fair [R35.0] Urinary frequency                  Lizandro Fair MD  02/05/25 0878

## 2025-02-07 ENCOUNTER — OFFICE VISIT (OUTPATIENT)
Dept: UROLOGY | Facility: MEDICAL CENTER | Age: 55
End: 2025-02-07

## 2025-02-07 VITALS
OXYGEN SATURATION: 97 % | HEIGHT: 73 IN | BODY MASS INDEX: 33.66 KG/M2 | HEART RATE: 93 BPM | SYSTOLIC BLOOD PRESSURE: 132 MMHG | DIASTOLIC BLOOD PRESSURE: 84 MMHG | WEIGHT: 254 LBS

## 2025-02-07 DIAGNOSIS — R35.0 FREQUENCY OF URINATION: Primary | ICD-10-CM

## 2025-02-07 DIAGNOSIS — N40.1 BENIGN LOCALIZED HYPERPLASIA OF PROSTATE WITH URINARY OBSTRUCTION AND LOWER URINARY TRACT SYMPTOMS: ICD-10-CM

## 2025-02-07 DIAGNOSIS — Z12.5 SCREENING FOR PROSTATE CANCER: ICD-10-CM

## 2025-02-07 DIAGNOSIS — N13.9 BENIGN LOCALIZED HYPERPLASIA OF PROSTATE WITH URINARY OBSTRUCTION AND LOWER URINARY TRACT SYMPTOMS: ICD-10-CM

## 2025-02-07 LAB
SL AMB  POCT GLUCOSE, UA: NORMAL
SL AMB LEUKOCYTE ESTERASE,UA: NORMAL
SL AMB POCT BILIRUBIN,UA: NORMAL
SL AMB POCT BLOOD,UA: NORMAL
SL AMB POCT CLARITY,UA: CLEAR
SL AMB POCT COLOR,UA: YELLOW
SL AMB POCT KETONES,UA: NORMAL
SL AMB POCT NITRITE,UA: NORMAL
SL AMB POCT PH,UA: 5
SL AMB POCT SPECIFIC GRAVITY,UA: >=1.03
SL AMB POCT URINE PROTEIN: NORMAL
SL AMB POCT UROBILINOGEN: 2

## 2025-02-07 RX ORDER — TAMSULOSIN HYDROCHLORIDE 0.4 MG/1
0.4 CAPSULE ORAL
Qty: 90 CAPSULE | Refills: 1 | Status: SHIPPED | OUTPATIENT
Start: 2025-02-07 | End: 2025-02-07 | Stop reason: CLARIF

## 2025-02-07 RX ORDER — ALFUZOSIN HYDROCHLORIDE 10 MG/1
10 TABLET, EXTENDED RELEASE ORAL DAILY
Qty: 30 TABLET | Refills: 3 | Status: SHIPPED | OUTPATIENT
Start: 2025-02-07 | End: 2025-06-07

## 2025-02-07 NOTE — PROGRESS NOTES
2/7/2025      Assessment and Plan    54 y.o. male managed by Dr. Osullivan    Benign localized hyperplasia of prostate with urinary obstruction and lower urinary tract symptoms  We reviewed bladder irritants and discussed avoidance or at least decreasing the amount of his coffee and cranberry juice intake would decrease his urinary frequency and urgency  We discussed pharmacotherapy in the forms of an alpha-blocker for treatment of his lower urinary tract symptoms.  Patient will initiate conservative measures with decreasing bladder irritants and trial alfuzosin 10 mg once daily for treatment of his lower urinary tract symptoms.  Patient will return to the office in 3 months to reevaluate his lower urinary tract symptoms on pharmacotherapy.    Screening for prostate cancer  Patient reports a positive family history for prostate cancer with his father being diagnosed with prostate cancer and noted that it metastasized to the bone.  Patient's most recent PSA was performed 9/12/2024 and found to be 0.485.  Refer to PSA trend below.  ERNESTINE performed today.  Palpate benign prostate.  Refer to physical exam findings.  We discussed that the patient's PSA is overall stable and that he can plan to repeat his PSA 1 year from his last.  Patient is good for a ERNESTINE for a full year.    Lab Results   Component Value Date    PSA 0.485 09/12/2024    PSA 0.40 08/08/2023    PSA 0.6 04/08/2022            History of Present Illness  Lucas Gibson is a 54 y.o. male here for evaluation of BPH with urinary frequency and flank pain.  Patient was seen in the emergency department on 2/5/2025 for right sided flank pain.  Patient underwent CT renal stone study on 2/5/2025 which revealed normal obstructive uropathy, no kidney stones bilaterally, or no hydronephrosis bilaterally.  Patient underwent PSA last on 9/12/2024 and was found to be 0.485.  Patient is not currently on any pharmacotherapy for treatment of lower urinary tract symptoms.  Today,  the patient reports that he also went to an urgent care and underwent urine studies which returned negative for ongoing UTI.  Patient notes that he has been having worsening urinary frequency and urgency as well as some degree of a weakened urinary stream.  Patient notes that he does drink large amounts of coffee and cranberry juice during the day and has noticed that this started after drinking large amounts of cranberry juice.  Otherwise, the patient does note a positive family history for prostate cancer with his father being diagnosed in his late 80s.  Patient notes that his father's prostate cancer did metastasized to the bone and he passed away from prostate cancer.  Patient's last PSA was performed 9/12/2024 and found to be 0.485.  Otherwise, the patient offers no other lower urinary tract complaints at today's office visit.        Review of Systems   Constitutional:  Negative for chills and fever.   HENT:  Negative for ear pain and sore throat.    Eyes:  Negative for pain and visual disturbance.   Respiratory:  Negative for cough and shortness of breath.    Cardiovascular:  Negative for chest pain and palpitations.   Gastrointestinal:  Negative for abdominal pain and vomiting.   Genitourinary:  Positive for difficulty urinating, frequency and urgency. Negative for decreased urine volume, dysuria, flank pain and hematuria.   Musculoskeletal:  Negative for arthralgias and back pain.   Skin:  Negative for color change and rash.   Neurological:  Negative for seizures and syncope.   All other systems reviewed and are negative.          AUA SYMPTOM SCORE      Flowsheet Row Most Recent Value   AUA SYMPTOM SCORE    How often have you had a sensation of not emptying your bladder completely after you finished urinating? 2 (P)     How often have you had to urinate again less than two hours after you finished urinating? 1 (P)     How often have you found you stopped and started again several times when you urinate? 2  "(P)     How often have you found it difficult to postpone urination? 0 (P)     How often have you had a weak urinary stream? 3 (P)     How often have you had to push or strain to begin urination? 0 (P)     How many times did you most typically get up to urinate from the time you went to bed at night until the time you got up in the morning? 5 (P)     Quality of Life: If you were to spend the rest of your life with your urinary condition just the way it is now, how would you feel about that? 3 (P)     AUA SYMPTOM SCORE 13 (P)               Vitals  Vitals:    02/07/25 1442   BP: 132/84   BP Location: Left arm   Patient Position: Sitting   Cuff Size: Standard   Pulse: 93   SpO2: 97%   Weight: 115 kg (254 lb)   Height: 6' 1\" (1.854 m)       Physical Exam  Vitals reviewed.   Constitutional:       General: He is not in acute distress.     Appearance: Normal appearance. He is not ill-appearing.   HENT:      Head: Normocephalic and atraumatic.      Nose: Nose normal.   Eyes:      General: No scleral icterus.  Pulmonary:      Effort: No respiratory distress.   Abdominal:      General: Abdomen is flat. There is no distension.      Palpations: Abdomen is soft.      Tenderness: There is no abdominal tenderness.   Musculoskeletal:         General: Normal range of motion.      Cervical back: Normal range of motion.   Skin:     General: Skin is warm.      Coloration: Skin is not jaundiced.   Neurological:      Mental Status: He is alert and oriented to person, place, and time.      Gait: Gait normal.   Psychiatric:         Mood and Affect: Mood normal.         Behavior: Behavior normal.           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 was advised to have " repeat in 6-12 months. Has been ordered already pending. Patient needs to have scheduled    No known health problems     history of denial of any significant medical , no pertinent past medical history per allscripts    Testicular hypogonadism     Varicocele      Social History     Socioeconomic History    Marital status: /Civil Union     Spouse name: None    Number of children: 2    Years of education: None    Highest education level: None   Occupational History    Occupation:    Tobacco Use    Smoking status: Never    Smokeless tobacco: Never   Vaping Use    Vaping status: Never Used   Substance and Sexual Activity    Alcohol use: Yes     Comment: Occasionally    Drug use: No    Sexual activity: Yes     Partners: Female   Other Topics Concern    None   Social History Narrative    Daily caffeine use- 6 cups of coffee     Social Drivers of Health     Financial Resource Strain: Not on file   Food Insecurity: Not on file   Transportation Needs: Not on file   Physical Activity: Not on file   Stress: Not on file   Social Connections: Not on file   Intimate Partner Violence: Not on file   Housing Stability: Not on file     Social History     Tobacco Use   Smoking Status Never   Smokeless Tobacco Never     Family History   Problem Relation Age of Onset    Heart disease Mother     Heart attack Mother     Breast cancer Mother     Hypertension Mother     Cerebral aneurysm Mother     Prostate cancer Father     Bone cancer Father     Diabetes type II Father     Testicular cancer Cousin     Coronary artery disease Maternal Grandmother     Colon cancer Paternal Grandmother     No Known Problems Son     No Known Problems Son        The following portions of the patient's history were reviewed and updated as appropriate: allergies, current medications, past medical history, past social history, past surgical history and problem list.    Results  No results found for this or any previous visit (from the  past hour).  ]  Lab Results   Component Value Date    PSA 0.485 09/12/2024    PSA 0.40 08/08/2023    PSA 0.6 04/08/2022    PSA 0.5 03/10/2021     Lab Results   Component Value Date    CALCIUM 9.3 02/05/2025    K 4.1 02/05/2025    CO2 28 02/05/2025     02/05/2025    BUN 19 02/05/2025    CREATININE 1.03 02/05/2025     Lab Results   Component Value Date    WBC 7.87 02/05/2025    HGB 15.9 02/05/2025    HCT 47.5 02/05/2025    MCV 95 02/05/2025     02/05/2025

## 2025-02-07 NOTE — ASSESSMENT & PLAN NOTE
We reviewed bladder irritants and discussed avoidance or at least decreasing the amount of his coffee and cranberry juice intake would decrease his urinary frequency and urgency  We discussed pharmacotherapy in the forms of an alpha-blocker for treatment of his lower urinary tract symptoms.  Patient will initiate conservative measures with decreasing bladder irritants and trial alfuzosin 10 mg once daily for treatment of his lower urinary tract symptoms.  Patient will return to the office in 3 months to reevaluate his lower urinary tract symptoms on pharmacotherapy.

## 2025-02-07 NOTE — ASSESSMENT & PLAN NOTE
Patient reports a positive family history for prostate cancer with his father being diagnosed with prostate cancer and noted that it metastasized to the bone.  Patient's most recent PSA was performed 9/12/2024 and found to be 0.485.  Refer to PSA trend below.  ERNESTINE performed today.  Palpate benign prostate.  Refer to physical exam findings.  We discussed that the patient's PSA is overall stable and that he can plan to repeat his PSA 1 year from his last.  Patient is good for a ERNESTINE for a full year.    Lab Results   Component Value Date    PSA 0.485 09/12/2024    PSA 0.40 08/08/2023    PSA 0.6 04/08/2022

## 2025-02-15 ENCOUNTER — HOSPITAL ENCOUNTER (EMERGENCY)
Facility: HOSPITAL | Age: 55
Discharge: HOME | End: 2025-02-15
Payer: COMMERCIAL

## 2025-02-15 ENCOUNTER — APPOINTMENT (EMERGENCY)
Dept: RADIOLOGY | Facility: HOSPITAL | Age: 55
End: 2025-02-15
Payer: COMMERCIAL

## 2025-02-15 VITALS
SYSTOLIC BLOOD PRESSURE: 152 MMHG | RESPIRATION RATE: 18 BRPM | DIASTOLIC BLOOD PRESSURE: 73 MMHG | HEART RATE: 105 BPM | TEMPERATURE: 97.6 F | OXYGEN SATURATION: 95 %
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
## 2025-02-15 DIAGNOSIS — M79.601 RIGHT ARM PAIN: ICD-10-CM

## 2025-02-15 PROCEDURE — 73030 X-RAY EXAM OF SHOULDER: CPT

## 2025-02-15 PROCEDURE — 99284 EMERGENCY DEPT VISIT MOD MDM: CPT

## 2025-02-15 PROCEDURE — 73060 X-RAY EXAM OF HUMERUS: CPT

## 2025-02-15 PROCEDURE — 73080 X-RAY EXAM OF ELBOW: CPT

## 2025-02-15 PROCEDURE — 73090 X-RAY EXAM OF FOREARM: CPT

## 2025-02-15 RX ORDER — LIDOCAINE 50 MG/G
1 PATCH TOPICAL DAILY
Qty: 30 PATCH | Refills: 0 | Status: SHIPPED | OUTPATIENT
Start: 2025-02-15

## 2025-02-15 NOTE — DISCHARGE INSTRUCTIONS
You have been evaluated in the emergency department for a fall with arm pain.  Your x-rays are normal.  You are safe to be discharged home.    Please use Tylenol and Lidoderm patches for your pain.  Please follow-up with orthopedic surgery in 1 week if your pain is persistent.    Please return if you develop any new or concerning symptoms including severe swelling, severe worsening of your pain, or new pain that you cannot control at home.

## 2025-02-15 NOTE — ED PROVIDER NOTES
Time reflects when diagnosis was documented in both MDM as applicable and the Disposition within this note       Time User Action Codes Description Comment    2/15/2025  3:15 PM AnniNessah Add [W19.XXXA] Fall, initial encounter     2/15/2025  3:15 PM Livanjose jFercho joseph Add [M79.601] Right arm pain           ED Disposition       ED Disposition   Discharge    Condition   Stable    Date/Time   Sat Feb 15, 2025  3:15 PM    Comment   Lucas Gibson discharge to home/self care.                   Assessment & Plan       Medical Decision Making  54-year-old male with history of previous DVT on Eliquis, hypertension presented today for evaluation of right arm pain after sustaining mechanical fall on ice in his driveway falling onto his right forearm.  States he is having pain of his right forearm through his shoulder.  Denies any weakness or tingling.  On evaluation, patient's arm is held in adduction and flexion at the elbow with no obvious deformities.  There are abrasions to the dorsal forearm.  There is pain to palpation of the elbow and shoulder.  Patient's right upper extremity is otherwise neurovascular intact.  Per chart review, last Tdap in 2020    Differential: Fracture complication, contusion, abrasion    Plan: X-rays of the right forearm, elbow, humerus, shoulder.  Patient declining pain medications at this time.    On review of imaging, I find no acute osseous abnormalities of patient's x-rays.  Will plan to discharge patient home with supportive care instructions for home.  Return precautions given, all questions answered.    Amount and/or Complexity of Data Reviewed  Radiology: ordered.    Risk  Prescription drug management.             Medications - No data to display    ED Risk Strat Scores                            SBIRT 20yo+      Flowsheet Row Most Recent Value   Initial Alcohol Screen: US AUDIT-C     1. How often do you have a drink containing alcohol? 0 Filed at: 02/15/2025 8721   2. How many  drinks containing alcohol do you have on a typical day you are drinking?  0 Filed at: 02/15/2025 1351   3a. Male UNDER 65: How often do you have five or more drinks on one occasion? 0 Filed at: 02/15/2025 1351   3b. FEMALE Any Age, or MALE 65+: How often do you have 4 or more drinks on one occassion? 0 Filed at: 02/15/2025 1351   Audit-C Score 0 Filed at: 02/15/2025 1351   ELVIRA: How many times in the past year have you...    Used an illegal drug or used a prescription medication for non-medical reasons? 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 was advised to have repeat in 6-12 months. Has been ordered already pending. Patient needs to have scheduled    No known health problems     history of denial of any significant medical , no pertinent past medical history per allscripts    Testicular hypogonadism     Varicocele       Past Surgical History:   Procedure Laterality Date    EYE SURGERY      INGUINAL HERNIA REPAIR      UMBILICAL HERNIA REPAIR      per allscripts    VASECTOMY      VEIN LIGATION AND STRIPPING        Family History   Problem Relation Age of Onset    Heart disease Mother     Heart attack Mother     Breast cancer Mother     Hypertension Mother     Cerebral aneurysm Mother     Prostate cancer Father     Bone cancer Father     Diabetes type II Father     Testicular cancer Cousin     Coronary artery disease Maternal Grandmother     Colon cancer Paternal Grandmother     No Known Problems Son     No Known Problems Son       Social History     Tobacco Use    Smoking status:  Never    Smokeless tobacco: Never   Vaping Use    Vaping status: Never Used   Substance Use Topics    Alcohol use: Yes     Comment: Occasionally    Drug use: No      E-Cigarette/Vaping    E-Cigarette Use Never User       E-Cigarette/Vaping Substances    Nicotine No     THC No     CBD No     Flavoring No     Other No     Unknown No       I have reviewed and agree with the history as documented.     Patient is a 54-year-old male history of DVT on Eliquis and hypertension presents today for evaluation of arm pain.  Patient states that earlier today he was walking to his mailbox and slipped on some ice, falling onto his right forearm.  He denies head strike or loss of conscious.  He states that he has been having right arm pain since that time.  He endorses pain in his right forearm, elbow, and shoulder.  He denies any weakness or tingling.  He denies any neck pain, head pain, back pain, leg pain, abdominal pain, or chest pain.        Review of Systems   Constitutional:  Negative for chills and fever.   HENT:  Negative for congestion, rhinorrhea and sore throat.    Eyes:  Negative for pain and redness.   Respiratory:  Negative for cough and shortness of breath.    Cardiovascular:  Negative for chest pain and palpitations.   Gastrointestinal:  Negative for abdominal pain, constipation, diarrhea, nausea and vomiting.   Genitourinary:  Negative for dysuria and hematuria.   Musculoskeletal:  Positive for arthralgias. Negative for back pain and neck pain.   Skin:  Negative for pallor and rash.   Neurological:  Negative for weakness and numbness.   All other systems reviewed and are negative.          Objective       ED Triage Vitals [02/15/25 1353]   Temperature Pulse Blood Pressure Respirations SpO2 Patient Position - Orthostatic VS   97.6 °F (36.4 °C) 105 152/73 18 95 % --      Temp src Heart Rate Source BP Location FiO2 (%) Pain Score    -- -- -- -- --      Vitals      Date and Time Temp Pulse SpO2 Resp BP Pain Score  FACES Pain Rating User   02/15/25 1353 97.6 °F (36.4 °C) 105 95 % 18 152/73 -- -- RTM            Physical Exam  Vitals and nursing note reviewed.   Constitutional:       General: He is not in acute distress.     Appearance: Normal appearance. He is well-developed. He is not ill-appearing, toxic-appearing or diaphoretic.   HENT:      Head: Normocephalic and atraumatic.      Right Ear: External ear normal.      Left Ear: External ear normal.      Nose: Nose normal. No congestion or rhinorrhea.      Mouth/Throat:      Mouth: Mucous membranes are moist.   Eyes:      General: No scleral icterus.        Right eye: No discharge.         Left eye: No discharge.      Conjunctiva/sclera: Conjunctivae normal.   Cardiovascular:      Rate and Rhythm: Normal rate and regular rhythm.      Pulses: Normal pulses.      Heart sounds: Normal heart sounds.   Pulmonary:      Effort: Pulmonary effort is normal.      Breath sounds: Normal breath sounds.   Abdominal:      General: Abdomen is flat.      Palpations: Abdomen is soft.   Musculoskeletal:         General: Tenderness and signs of injury present. No swelling or deformity. Normal range of motion.      Cervical back: Normal range of motion and neck supple. No rigidity or tenderness.      Comments: Tenderness outpatient of the right proximal forearm, elbow, and shoulder.  There are abrasions overlying the dorsal forearm.  Bilateral 2+ radial pulses.  Sensation gross intact in bilateral upper extremities.    Skin:     General: Skin is warm and dry.      Capillary Refill: Capillary refill takes less than 2 seconds.      Coloration: Skin is not jaundiced or pale.   Neurological:      General: No focal deficit present.      Mental Status: He is alert and oriented to person, place, and time.   Psychiatric:         Mood and Affect: Mood normal.         Behavior: Behavior normal.         Results Reviewed       None            XR forearm 2 views RIGHT   Final Interpretation by Alfredo Pereira MD  (02/15 150)      No acute osseous abnormality.         Computerized Assisted Algorithm (CAA) may have been used to analyze all applicable images.            Workstation performed: [id number]         XR elbow 3+ vw RIGHT   Final Interpretation by Alfredo Pereira MD (02/15 1505)      No acute osseous abnormality.         Computerized Assisted Algorithm (CAA) may have been used to analyze all applicable images.         Workstation performed: [id number]         XR shoulder 2+ views RIGHT   Final Interpretation by Alfredo Pereira MD (02/15 150)      No acute osseous abnormality.         Computerized Assisted Algorithm (CAA) may have been used to analyze all applicable images.         Workstation performed: [id number]         XR humerus RIGHT    (Results Pending)       Procedures    ED Medication and Procedure Management   Prior to Admission Medications   Prescriptions Last Dose Informant Patient Reported? Taking?   Omega-3 1000 MG CAPS  Self Yes No   Sig: Take by mouth in the morning   Red Yeast Rice 600 MG TABS  Self Yes No   Sig: Take by mouth in the morning   alfuzosin (UROXATRAL) 10 mg 24 hr tablet   No No   Sig: Take 1 tablet (10 mg total) by mouth daily   fluticasone (FLONASE) 50 mcg/act nasal spray  Self No No   Si sprays into each nostril daily   lisinopril (ZESTRIL) 10 mg tablet   No No   Sig: Take 1 tablet by mouth once daily   loratadine (CLARITIN) 10 mg tablet  Self No No   Sig: Take 1 tablet (10 mg total) by mouth daily   rivaroxaban (Xarelto) 10 mg tablet   No No   Sig: Take 1 tablet by mouth once daily   sildenafil (VIAGRA) 25 MG tablet  Self No No   Si to 2 tablets p.o. daily as needed      Facility-Administered Medications: None     Discharge Medication List as of 2/15/2025  3:19 PM        START taking these medications    Details   lidocaine (Lidoderm) 5 % Apply 1 patch topically over 12 hours daily Remove & Discard patch within 12 hours or as directed by MD, Starting Sat 2/15/2025, Normal            CONTINUE these medications which have NOT CHANGED    Details   alfuzosin (UROXATRAL) 10 mg 24 hr tablet Take 1 tablet (10 mg total) by mouth daily, Starting Fri 2/7/2025, Until Sat 6/7/2025, Normal      fluticasone (FLONASE) 50 mcg/act nasal spray 2 sprays into each nostril daily, Starting Wed 4/12/2023, Normal      lisinopril (ZESTRIL) 10 mg tablet Take 1 tablet by mouth once daily, Starting Fri 12/20/2024, Normal      loratadine (CLARITIN) 10 mg tablet Take 1 tablet (10 mg total) by mouth daily, Starting Wed 4/12/2023, Normal      Omega-3 1000 MG CAPS Take by mouth in the morning, Historical Med      Red Yeast Rice 600 MG TABS Take by mouth in the morning, Historical Med      rivaroxaban (Xarelto) 10 mg tablet Take 1 tablet by mouth once daily, Starting Fri 1/3/2025, Normal      sildenafil (VIAGRA) 25 MG tablet 1 to 2 tablets p.o. daily as needed, Normal             ED SEPSIS DOCUMENTATION   Time reflects when diagnosis was documented in both MDM as applicable and the Disposition within this note       Time User Action Codes Description Comment    2/15/2025  3:15 PM Fercho Hutton [W19.XXXA] Fall, initial encounter     2/15/2025  3:15 PM Fercho Hutton [M79.601] Right arm pain                  Fercho Hutton MD  02/16/25 0005

## 2025-03-09 PROBLEM — Z12.5 SCREENING FOR PROSTATE CANCER: Status: RESOLVED | Noted: 2025-02-07 | Resolved: 2025-03-09

## 2025-05-05 ENCOUNTER — OFFICE VISIT (OUTPATIENT)
Dept: FAMILY MEDICINE CLINIC | Facility: CLINIC | Age: 55
End: 2025-05-05
Payer: COMMERCIAL

## 2025-05-05 VITALS
BODY MASS INDEX: 33.16 KG/M2 | DIASTOLIC BLOOD PRESSURE: 72 MMHG | HEART RATE: 108 BPM | TEMPERATURE: 96.2 F | WEIGHT: 250.2 LBS | HEIGHT: 73 IN | OXYGEN SATURATION: 99 % | SYSTOLIC BLOOD PRESSURE: 124 MMHG

## 2025-05-05 DIAGNOSIS — Z12.5 SCREENING FOR PROSTATE CANCER: ICD-10-CM

## 2025-05-05 DIAGNOSIS — I82.5Y3 CHRONIC DEEP VEIN THROMBOSIS (DVT) OF PROXIMAL VEIN OF BOTH LOWER EXTREMITIES (HCC): Chronic | ICD-10-CM

## 2025-05-05 DIAGNOSIS — I10 BENIGN ESSENTIAL HYPERTENSION: Primary | ICD-10-CM

## 2025-05-05 PROCEDURE — 99213 OFFICE O/P EST LOW 20 MIN: CPT | Performed by: FAMILY MEDICINE

## 2025-05-05 NOTE — PROGRESS NOTES
"Assessment/Plan:    No problem-specific Assessment & Plan notes found for this encounter.       Diagnoses and all orders for this visit:    Benign essential hypertension  Comments:  well controlled  Orders:  -     CBC and differential; Future  -     Comprehensive metabolic panel; Future  -     Lipid panel; Future  -     TSH, 3rd generation with Free T4 reflex; Future    Chronic deep vein thrombosis (DVT) of proximal vein of both lower extremities (HCC)  Comments:  stable    Screening for prostate cancer  -     PSA, Total Screen; Future          PHQ-2/9 Depression Screening    Little interest or pleasure in doing things: 0 - not at all  Feeling down, depressed, or hopeless: 0 - not at all  PHQ-2 Score: 0  PHQ-2 Interpretation: Negative depression screen            Subjective:      Patient ID: Lucas Gibson is a 54 y.o. male.    Hypertension  This is a chronic problem. The current episode started more than 1 year ago. The problem is unchanged. The problem is controlled. Pertinent negatives include no chest pain, headaches or palpitations.       The following portions of the patient's history were reviewed and updated as appropriate: allergies, current medications, past family history, past medical history, past social history, past surgical history, and problem list.    Review of Systems   Eyes:  Negative for visual disturbance.   Cardiovascular:  Negative for chest pain and palpitations.   Neurological:  Negative for headaches.         Objective:    /72 (Patient Position: Sitting)   Pulse (!) 108   Temp (!) 96.2 °F (35.7 °C) (Tympanic)   Ht 6' 1\" (1.854 m)   Wt 113 kg (250 lb 3.2 oz)   SpO2 99%   BMI 33.01 kg/m²      Physical Exam  Vitals and nursing note reviewed.   Constitutional:       General: He is not in acute distress.     Appearance: Normal appearance. He is not ill-appearing, toxic-appearing or diaphoretic.   HENT:      Head: Normocephalic and atraumatic.   Eyes:      Conjunctiva/sclera: " Conjunctivae normal.   Cardiovascular:      Rate and Rhythm: Normal rate and regular rhythm.      Heart sounds: Normal heart sounds. No murmur heard.  Pulmonary:      Effort: Pulmonary effort is normal. No respiratory distress.      Breath sounds: Normal breath sounds. No wheezing, rhonchi or rales.   Abdominal:      General: There is no distension.      Palpations: Abdomen is soft. There is no mass.      Tenderness: There is no abdominal tenderness. There is no guarding or rebound.   Musculoskeletal:      Cervical back: Normal range of motion and neck supple. No rigidity.      Right lower leg: No edema.      Left lower leg: No edema.   Lymphadenopathy:      Cervical: No cervical adenopathy.   Neurological:      Mental Status: He is alert and oriented to person, place, and time.   Psychiatric:         Mood and Affect: Mood normal.         Behavior: Behavior normal.         Thought Content: Thought content normal.         Judgment: Judgment normal.

## 2025-05-16 ENCOUNTER — OFFICE VISIT (OUTPATIENT)
Dept: UROLOGY | Facility: MEDICAL CENTER | Age: 55
End: 2025-05-16
Payer: COMMERCIAL

## 2025-05-16 VITALS
HEART RATE: 73 BPM | HEIGHT: 73 IN | DIASTOLIC BLOOD PRESSURE: 80 MMHG | SYSTOLIC BLOOD PRESSURE: 120 MMHG | WEIGHT: 248 LBS | OXYGEN SATURATION: 98 % | BODY MASS INDEX: 32.87 KG/M2

## 2025-05-16 DIAGNOSIS — N13.9 BENIGN LOCALIZED HYPERPLASIA OF PROSTATE WITH URINARY OBSTRUCTION AND LOWER URINARY TRACT SYMPTOMS: Primary | ICD-10-CM

## 2025-05-16 DIAGNOSIS — N40.1 BENIGN LOCALIZED HYPERPLASIA OF PROSTATE WITH URINARY OBSTRUCTION AND LOWER URINARY TRACT SYMPTOMS: Primary | ICD-10-CM

## 2025-05-16 PROBLEM — N13.8 BPH WITH URINARY OBSTRUCTION: Status: RESOLVED | Noted: 2019-04-12 | Resolved: 2025-05-16

## 2025-05-16 PROCEDURE — 99213 OFFICE O/P EST LOW 20 MIN: CPT | Performed by: UROLOGY

## 2025-05-16 NOTE — PROGRESS NOTES
Name: Lucas Gibson      : 1970      MRN: [id number]  Encounter Provider: Lyndon Osullivan MD  Encounter Date: 2025   [facility]  :  Assessment & Plan  Benign localized hyperplasia of prostate with urinary obstruction and lower urinary tract symptoms  AUA SS is now 4.  He is satisfied with his voiding pattern.  Urinalysis have been negative and ERNESTINE palpably benign at his last visit.  PSA is normal at 0.485 (2024).  We will plan to recheck PSA in 2025.  We will continue to follow his voiding pattern watchful waiting.  He will return in 1 year.           History of Present Illness   Lucas Gibson is a 54 y.o. male who presents for follow-up.  He notes that his voiding pattern improved when he cut back on his coffee and cranberry juice.  He never needed to take Uroxatrol.  He is now voiding well.  His stream is adequate and he feels he empties his bladder.  He is getting up only once at night to urinate.  There is no gross hematuria or symptoms of infection.  He has no urgency or incontinence.  At this point he is satisfied with his voiding pattern.  AUA SYMPTOM SCORE      Flowsheet Row Most Recent Value   AUA SYMPTOM SCORE    How often have you had a sensation of not emptying your bladder completely after you finished urinating? 1 (P)     How often have you had to urinate again less than two hours after you finished urinating? 0 (P)     How often have you found you stopped and started again several times when you urinate? 1 (P)     How often have you found it difficult to postpone urination? 0 (P)     How often have you had a weak urinary stream? 1 (P)     How often have you had to push or strain to begin urination? 0 (P)     How many times did you most typically get up to urinate from the time you went to bed at night until the time you got up in the morning? 1 (P)     Quality of Life: If you were to spend the rest of your life  "with your urinary condition just the way it is now, how would you feel about that? 1 (P)     AUA SYMPTOM SCORE 4 (P)            Review of Systems   Constitutional: Negative.  Negative for chills, diaphoresis, fatigue and fever.   HENT: Negative.     Eyes: Negative.    Respiratory: Negative.     Cardiovascular: Negative.    Endocrine: Negative.    Genitourinary:         See HPI   Musculoskeletal: Negative.    Skin: Negative.    Allergic/Immunologic: Negative.    Neurological: Negative.    Hematological: Negative.    Psychiatric/Behavioral: Negative.            Objective   /80 (BP Location: Left arm, Patient Position: Sitting, Cuff Size: Standard)   Pulse 73   Ht 6' 1\" (1.854 m)   Wt 112 kg (248 lb)   SpO2 98%   BMI 32.72 kg/m²     Physical Exam  Vitals reviewed.   Constitutional:       General: He is not in acute distress.     Appearance: Normal appearance. He is well-developed and normal weight. He is not ill-appearing, toxic-appearing or diaphoretic.   HENT:      Head: Normocephalic and atraumatic.     Eyes:      General: No scleral icterus.     Conjunctiva/sclera: Conjunctivae normal.       Cardiovascular:      Rate and Rhythm: Normal rate.   Pulmonary:      Effort: Pulmonary effort is normal.   Abdominal:      General: Abdomen is flat. There is no distension.      Palpations: There is no mass.      Tenderness: There is no abdominal tenderness. There is no right CVA tenderness, left CVA tenderness, guarding or rebound.      Hernia: No hernia is present.   Genitourinary:     Penis: Normal.       Testes: Normal.      Comments: Right high riding in inguinal canal    Musculoskeletal:      Cervical back: Neck supple.     Skin:     General: Skin is warm and dry.     Neurological:      General: No focal deficit present.      Mental Status: He is alert and oriented to person, place, and time.     Psychiatric:         Behavior: Behavior normal.         Thought Content: Thought content normal.         Judgment: " Judgment normal.          Results   Lab Results   Component Value Date    PSA 0.485 09/12/2024    PSA 0.40 08/08/2023    PSA 0.6 04/08/2022     Lab Results   Component Value Date    CALCIUM 9.3 02/05/2025    K 4.1 02/05/2025    CO2 28 02/05/2025     02/05/2025    BUN 19 02/05/2025    CREATININE 1.03 02/05/2025     Lab Results   Component Value Date    WBC 7.87 02/05/2025    HGB 15.9 02/05/2025    HCT 47.5 02/05/2025    MCV 95 02/05/2025     02/05/2025       Office Urine Dip  No results found for this or any previous visit (from the past hour).

## 2025-05-16 NOTE — LETTER
May 16, 2025     Cliff Burrell DO  [address]  [place] [address]    Patient: Lucas Gibson   YOB: 1970   Date of Visit: 2025       Dear Dr. Cliff Burrell DO:    Thank you for referring Lucas Gibson to me for evaluation. Below are my notes for this consultation.    If you have questions, please do not hesitate to call me. I look forward to following your patient along with you.         Sincerely,        Lyndon Osullivan MD        CC: No Recipients    Lyndon Osullivan MD  2025  9:43 AM  Sign when Signing Visit  Name: Lucas Gibson      : 1970      MRN: [id number]  Encounter Provider: Lyndon Osullivan MD  Encounter Date: 2025   [facility]  :  Assessment & Plan  Benign localized hyperplasia of prostate with urinary obstruction and lower urinary tract symptoms  AUA SS is now 4.  He is satisfied with his voiding pattern.  Urinalysis have been negative and ERNESTINE palpably benign at his last visit.  PSA is normal at 0.485 (2024).  We will plan to recheck PSA in 2025.  We will continue to follow his voiding pattern watchful waiting.  He will return in 1 year.           History of Present Illness  Lucas Gibson is a 54 y.o. male who presents for follow-up.  He notes that his voiding pattern improved when he cut back on his coffee and cranberry juice.  He never needed to take Uroxatrol.  He is now voiding well.  His stream is adequate and he feels he empties his bladder.  He is getting up only once at night to urinate.  There is no gross hematuria or symptoms of infection.  He has no urgency or incontinence.  At this point he is satisfied with his voiding pattern.  AUA SYMPTOM SCORE      Flowsheet Row Most Recent Value   AUA SYMPTOM SCORE    How often have you had a sensation of not emptying your bladder completely after you finished urinating? 1 (P)     How often have you had to urinate  "again less than two hours after you finished urinating? 0 (P)     How often have you found you stopped and started again several times when you urinate? 1 (P)     How often have you found it difficult to postpone urination? 0 (P)     How often have you had a weak urinary stream? 1 (P)     How often have you had to push or strain to begin urination? 0 (P)     How many times did you most typically get up to urinate from the time you went to bed at night until the time you got up in the morning? 1 (P)     Quality of Life: If you were to spend the rest of your life with your urinary condition just the way it is now, how would you feel about that? 1 (P)     AUA SYMPTOM SCORE 4 (P)            Review of Systems   Constitutional: Negative.  Negative for chills, diaphoresis, fatigue and fever.   HENT: Negative.     Eyes: Negative.    Respiratory: Negative.     Cardiovascular: Negative.    Endocrine: Negative.    Genitourinary:         See HPI   Musculoskeletal: Negative.    Skin: Negative.    Allergic/Immunologic: Negative.    Neurological: Negative.    Hematological: Negative.    Psychiatric/Behavioral: Negative.            Objective  /80 (BP Location: Left arm, Patient Position: Sitting, Cuff Size: Standard)   Pulse 73   Ht 6' 1\" (1.854 m)   Wt 112 kg (248 lb)   SpO2 98%   BMI 32.72 kg/m²     Physical Exam  Vitals reviewed.   Constitutional:       General: He is not in acute distress.     Appearance: Normal appearance. He is well-developed and normal weight. He is not ill-appearing, toxic-appearing or diaphoretic.   HENT:      Head: Normocephalic and atraumatic.     Eyes:      General: No scleral icterus.     Conjunctiva/sclera: Conjunctivae normal.       Cardiovascular:      Rate and Rhythm: Normal rate.   Pulmonary:      Effort: Pulmonary effort is normal.   Abdominal:      General: Abdomen is flat. There is no distension.      Palpations: There is no mass.      Tenderness: There is no abdominal tenderness. " There is no right CVA tenderness, left CVA tenderness, guarding or rebound.      Hernia: No hernia is present.   Genitourinary:     Penis: Normal.       Testes: Normal.      Comments: Right high riding in inguinal canal    Musculoskeletal:      Cervical back: Neck supple.     Skin:     General: Skin is warm and dry.     Neurological:      General: No focal deficit present.      Mental Status: He is alert and oriented to person, place, and time.     Psychiatric:         Behavior: Behavior normal.         Thought Content: Thought content normal.         Judgment: Judgment normal.          Results   Lab Results   Component Value Date    PSA 0.485 09/12/2024    PSA 0.40 08/08/2023    PSA 0.6 04/08/2022     Lab Results   Component Value Date    CALCIUM 9.3 02/05/2025    K 4.1 02/05/2025    CO2 28 02/05/2025     02/05/2025    BUN 19 02/05/2025    CREATININE 1.03 02/05/2025     Lab Results   Component Value Date    WBC 7.87 02/05/2025    HGB 15.9 02/05/2025    HCT 47.5 02/05/2025    MCV 95 02/05/2025     02/05/2025       Office Urine Dip  No results found for this or any previous visit (from the past hour).

## 2025-05-16 NOTE — ASSESSMENT & PLAN NOTE
AUA SS is now 4.  He is satisfied with his voiding pattern.  Urinalysis have been negative and ERNESTINE palpably benign at his last visit.  PSA is normal at 0.485 (September 2024).  We will plan to recheck PSA in September 2025.  We will continue to follow his voiding pattern watchful waiting.  He will return in 1 year.

## 2025-06-29 DIAGNOSIS — Z86.718 HISTORY OF DVT (DEEP VEIN THROMBOSIS): ICD-10-CM

## 2025-07-01 RX ORDER — RIVAROXABAN 10 MG/1
10 TABLET, FILM COATED ORAL DAILY
Qty: 90 TABLET | Refills: 1 | Status: SHIPPED | OUTPATIENT
Start: 2025-07-01

## 2025-07-03 DIAGNOSIS — I10 BENIGN ESSENTIAL HYPERTENSION: ICD-10-CM

## 2025-07-03 DIAGNOSIS — J30.1 SEASONAL ALLERGIC RHINITIS DUE TO POLLEN: ICD-10-CM

## 2025-07-03 RX ORDER — LISINOPRIL 10 MG/1
10 TABLET ORAL DAILY
Qty: 90 TABLET | Refills: 1 | Status: SHIPPED | OUTPATIENT
Start: 2025-07-03